# Patient Record
Sex: MALE | Race: WHITE | Employment: OTHER | URBAN - METROPOLITAN AREA
[De-identification: names, ages, dates, MRNs, and addresses within clinical notes are randomized per-mention and may not be internally consistent; named-entity substitution may affect disease eponyms.]

---

## 2017-01-24 DIAGNOSIS — R73.03 PREDIABETES: ICD-10-CM

## 2017-01-24 RX ORDER — METFORMIN HYDROCHLORIDE 500 MG/1
500 TABLET, EXTENDED RELEASE ORAL
Qty: 90 TABLET | Refills: 1 | Status: SHIPPED | OUTPATIENT
Start: 2017-01-24 | End: 2017-07-17 | Stop reason: SDUPTHER

## 2017-05-08 DIAGNOSIS — I10 ESSENTIAL HYPERTENSION: ICD-10-CM

## 2017-05-08 RX ORDER — TRIAMTERENE AND HYDROCHLOROTHIAZIDE 37.5; 25 MG/1; MG/1
CAPSULE ORAL
Qty: 90 CAPSULE | Refills: 1 | Status: SHIPPED | OUTPATIENT
Start: 2017-05-08 | End: 2017-10-05 | Stop reason: SDUPTHER

## 2017-05-08 RX ORDER — SIMVASTATIN 10 MG
TABLET ORAL
Qty: 90 TABLET | Refills: 1 | Status: SHIPPED | OUTPATIENT
Start: 2017-05-08 | End: 2017-10-05 | Stop reason: SDUPTHER

## 2018-03-22 ENCOUNTER — HOSPITAL ENCOUNTER (OUTPATIENT)
Age: 68
Discharge: HOME | End: 2018-03-22
Payer: MEDICARE

## 2018-03-22 DIAGNOSIS — Z12.5 PROSTATE CANCER SCREENING: ICD-10-CM

## 2018-03-22 DIAGNOSIS — E55.9 VITAMIN D DEFICIENCY: ICD-10-CM

## 2018-03-22 DIAGNOSIS — E78.2 MIXED HYPERLIPIDEMIA: ICD-10-CM

## 2018-03-22 DIAGNOSIS — I10 ESSENTIAL HYPERTENSION: ICD-10-CM

## 2018-03-22 LAB
ALBUMIN SERPL-MCNC: 4.4 G/DL (ref 3.5–5.2)
ALBUMIN/GLOBULIN RATIO: ABNORMAL (ref 1–2.5)
ALP BLD-CCNC: 56 U/L (ref 40–129)
ALT SERPL-CCNC: 18 U/L (ref 5–41)
ANION GAP SERPL CALCULATED.3IONS-SCNC: 13 MMOL/L (ref 9–17)
AST SERPL-CCNC: 15 U/L
BILIRUB SERPL-MCNC: 0.64 MG/DL (ref 0.3–1.2)
BUN BLDV-MCNC: 21 MG/DL (ref 8–23)
BUN/CREAT BLD: ABNORMAL (ref 9–20)
CALCIUM SERPL-MCNC: 9.2 MG/DL (ref 8.6–10.4)
CHLORIDE BLD-SCNC: 100 MMOL/L (ref 98–107)
CHOLESTEROL/HDL RATIO: 4.4
CHOLESTEROL: 168 MG/DL
CO2: 27 MMOL/L (ref 20–31)
CREAT SERPL-MCNC: 0.78 MG/DL (ref 0.7–1.2)
GFR AFRICAN AMERICAN: >60 ML/MIN
GFR NON-AFRICAN AMERICAN: >60 ML/MIN
GFR SERPL CREATININE-BSD FRML MDRD: ABNORMAL ML/MIN/{1.73_M2}
GFR SERPL CREATININE-BSD FRML MDRD: ABNORMAL ML/MIN/{1.73_M2}
GLUCOSE BLD-MCNC: 255 MG/DL (ref 70–99)
HCT VFR BLD CALC: 45.5 % (ref 41–53)
HDLC SERPL-MCNC: 38 MG/DL
HEMOGLOBIN: 15.2 G/DL (ref 13.5–17.5)
LDL CHOLESTEROL: 83 MG/DL (ref 0–130)
MCH RBC QN AUTO: 30.2 PG (ref 26–34)
MCHC RBC AUTO-ENTMCNC: 33.5 G/DL (ref 31–37)
MCV RBC AUTO: 90.2 FL (ref 80–100)
NRBC AUTOMATED: NORMAL PER 100 WBC
PDW BLD-RTO: 14.4 % (ref 11.5–14.9)
PLATELET # BLD: 168 K/UL (ref 150–450)
PMV BLD AUTO: 9.7 FL (ref 6–12)
POTASSIUM SERPL-SCNC: 3.6 MMOL/L (ref 3.7–5.3)
PROSTATE SPECIFIC ANTIGEN: 0.82 UG/L
RBC # BLD: 5.04 M/UL (ref 4.5–5.9)
SODIUM BLD-SCNC: 140 MMOL/L (ref 135–144)
TOTAL PROTEIN: 7.5 G/DL (ref 6.4–8.3)
TRIGL SERPL-MCNC: 234 MG/DL
VITAMIN D 25-HYDROXY: 26 NG/ML (ref 30–100)
VLDLC SERPL CALC-MCNC: ABNORMAL MG/DL (ref 1–30)
WBC # BLD: 6.5 K/UL (ref 3.5–11)

## 2018-03-22 PROCEDURE — 82306 VITAMIN D 25 HYDROXY: CPT

## 2018-03-22 PROCEDURE — 85027 COMPLETE CBC AUTOMATED: CPT

## 2018-03-22 PROCEDURE — 80061 LIPID PANEL: CPT

## 2018-03-22 PROCEDURE — 36415 COLL VENOUS BLD VENIPUNCTURE: CPT

## 2018-03-22 PROCEDURE — G0103 PSA SCREENING: HCPCS

## 2018-03-22 PROCEDURE — 80053 COMPREHEN METABOLIC PANEL: CPT

## 2018-07-19 ENCOUNTER — HOSPITAL ENCOUNTER (OUTPATIENT)
Age: 68
Discharge: HOME | End: 2018-07-19
Payer: MEDICARE

## 2018-07-19 LAB
CREATININE URINE: 140.9 MG/DL (ref 39–259)
ESTIMATED AVERAGE GLUCOSE: 140 MG/DL
HBA1C MFR BLD: 6.5 % (ref 4–6)
MICROALBUMIN/CREAT 24H UR: 14 MG/L
MICROALBUMIN/CREAT UR-RTO: 10 MCG/MG CREAT

## 2018-07-19 PROCEDURE — 82043 UR ALBUMIN QUANTITATIVE: CPT

## 2018-07-19 PROCEDURE — 82570 ASSAY OF URINE CREATININE: CPT

## 2018-07-19 PROCEDURE — 83036 HEMOGLOBIN GLYCOSYLATED A1C: CPT

## 2018-07-19 PROCEDURE — 36415 COLL VENOUS BLD VENIPUNCTURE: CPT

## 2018-07-26 RX ORDER — SIMVASTATIN 10 MG
TABLET ORAL
Qty: 90 TABLET | Refills: 1 | Status: SHIPPED | OUTPATIENT
Start: 2018-07-26 | End: 2018-08-14 | Stop reason: SDUPTHER

## 2018-07-26 NOTE — TELEPHONE ENCOUNTER
Please Approve and Refuse. Last Visit Date: Visit date not found  Next Visit Date:  Visit date not found    Hemoglobin A1C (%)   Date Value   07/19/2018 6.5 (H)   04/05/2018 10.6   08/22/2015 6.7 (H)             ( goal A1C is < 7)   Microalb/Crt.  Ratio (mcg/mg creat)   Date Value   07/19/2018 10     LDL Cholesterol (mg/dL)   Date Value   03/22/2018 83       (goal LDL is <100)   AST (U/L)   Date Value   03/22/2018 15     ALT (U/L)   Date Value   03/22/2018 18     BUN (mg/dL)   Date Value   03/22/2018 21     BP Readings from Last 3 Encounters:   04/05/18 134/76   10/05/17 134/80   04/05/17 130/78          (goal 120/80)        Patient Active Problem List:     Hypertension     Hyperlipidemia     RA (rheumatoid arthritis) ([facility] [address]     Borderline diabetic     Prediabetes     Essential hypertension     Mixed hyperlipidemia     Rheumatoid arthritis involving multiple joints (HCC)     Vitamin D deficiency

## 2018-08-14 ENCOUNTER — OFFICE VISIT (OUTPATIENT)
Dept: FAMILY MEDICINE CLINIC | Age: 68
End: 2018-08-14
Payer: MEDICARE

## 2018-08-14 VITALS
OXYGEN SATURATION: 98 % | HEIGHT: 67 IN | SYSTOLIC BLOOD PRESSURE: 136 MMHG | TEMPERATURE: 98 F | HEART RATE: 70 BPM | BODY MASS INDEX: 30.29 KG/M2 | DIASTOLIC BLOOD PRESSURE: 72 MMHG | WEIGHT: 193 LBS

## 2018-08-14 DIAGNOSIS — M06.9 RHEUMATOID ARTHRITIS INVOLVING MULTIPLE JOINTS (HCC): ICD-10-CM

## 2018-08-14 DIAGNOSIS — Z12.11 COLON CANCER SCREENING: ICD-10-CM

## 2018-08-14 DIAGNOSIS — I10 ESSENTIAL HYPERTENSION: Primary | ICD-10-CM

## 2018-08-14 DIAGNOSIS — E87.6 HYPOKALEMIA: ICD-10-CM

## 2018-08-14 DIAGNOSIS — E11.9 TYPE 2 DIABETES MELLITUS WITHOUT COMPLICATION, WITHOUT LONG-TERM CURRENT USE OF INSULIN (HCC): ICD-10-CM

## 2018-08-14 DIAGNOSIS — E78.2 MIXED HYPERLIPIDEMIA: ICD-10-CM

## 2018-08-14 PROCEDURE — 3044F HG A1C LEVEL LT 7.0%: CPT | Performed by: FAMILY MEDICINE

## 2018-08-14 PROCEDURE — 2022F DILAT RTA XM EVC RTNOPTHY: CPT | Performed by: FAMILY MEDICINE

## 2018-08-14 PROCEDURE — 99214 OFFICE O/P EST MOD 30 MIN: CPT | Performed by: FAMILY MEDICINE

## 2018-08-14 PROCEDURE — 4040F PNEUMOC VAC/ADMIN/RCVD: CPT | Performed by: FAMILY MEDICINE

## 2018-08-14 PROCEDURE — 1036F TOBACCO NON-USER: CPT | Performed by: FAMILY MEDICINE

## 2018-08-14 PROCEDURE — 3017F COLORECTAL CA SCREEN DOC REV: CPT | Performed by: FAMILY MEDICINE

## 2018-08-14 PROCEDURE — 1101F PT FALLS ASSESS-DOCD LE1/YR: CPT | Performed by: FAMILY MEDICINE

## 2018-08-14 PROCEDURE — 1123F ACP DISCUSS/DSCN MKR DOCD: CPT | Performed by: FAMILY MEDICINE

## 2018-08-14 PROCEDURE — G8427 DOCREV CUR MEDS BY ELIG CLIN: HCPCS | Performed by: FAMILY MEDICINE

## 2018-08-14 PROCEDURE — G8417 CALC BMI ABV UP PARAM F/U: HCPCS | Performed by: FAMILY MEDICINE

## 2018-08-14 RX ORDER — GLUCOSAMINE HCL/CHONDROITIN SU 500-400 MG
CAPSULE ORAL
Qty: 100 STRIP | Refills: 3 | Status: SHIPPED | OUTPATIENT
Start: 2018-08-14 | End: 2020-07-02 | Stop reason: SDUPTHER

## 2018-08-14 RX ORDER — SIMVASTATIN 10 MG
TABLET ORAL
Qty: 90 TABLET | Refills: 1 | Status: SHIPPED | OUTPATIENT
Start: 2018-08-14 | End: 2019-04-05 | Stop reason: SDUPTHER

## 2018-08-14 RX ORDER — ATENOLOL 50 MG/1
TABLET ORAL
Qty: 135 TABLET | Refills: 3 | Status: SHIPPED | OUTPATIENT
Start: 2018-08-14 | End: 2019-10-15 | Stop reason: SDUPTHER

## 2018-08-14 RX ORDER — AMLODIPINE BESYLATE 5 MG/1
TABLET ORAL
Qty: 90 TABLET | Refills: 3 | Status: SHIPPED | OUTPATIENT
Start: 2018-08-14 | End: 2019-10-13 | Stop reason: SDUPTHER

## 2018-08-14 RX ORDER — POTASSIUM CHLORIDE 750 MG/1
CAPSULE, EXTENDED RELEASE ORAL
Qty: 180 CAPSULE | Refills: 3 | Status: SHIPPED | OUTPATIENT
Start: 2018-08-14 | End: 2019-06-10 | Stop reason: SDUPTHER

## 2018-08-14 RX ORDER — LOSARTAN POTASSIUM 100 MG/1
TABLET ORAL
Qty: 90 TABLET | Refills: 3 | Status: SHIPPED | OUTPATIENT
Start: 2018-08-14 | End: 2019-07-29 | Stop reason: SDUPTHER

## 2018-08-14 ASSESSMENT — ENCOUNTER SYMPTOMS
RHINORRHEA: 0
PHOTOPHOBIA: 0
CHEST TIGHTNESS: 0
ABDOMINAL PAIN: 0
SORE THROAT: 0
SHORTNESS OF BREATH: 0
WHEEZING: 0
DIARRHEA: 0
CONSTIPATION: 0
SINUS PRESSURE: 0
BLOOD IN STOOL: 0
COLOR CHANGE: 0
VOMITING: 0

## 2018-08-14 NOTE — PROGRESS NOTES
Visit Information    Have you changed or started any medications since your last visit including any over-the-counter medicines, vitamins, or herbal medicines? no   Are you having any side effects from any of your medications? -  no  Have you stopped taking any of your medications? Is so, why? -  no    Have you seen any other physician or provider since your last visit? Yes - Records Obtained  Have you had any other diagnostic tests since your last visit? No  Have you been seen in the emergency room and/or had an admission to a hospital since we last saw you? No  Have you had your routine dental cleaning in the past 6 months? yes -     Have you activated your [employer] account? If not, what are your barriers?  Yes     Patient Care Team:  Pb Wilkins MD as PCP - General (Family Medicine)  Gary Magaña MD as PCP - S Attributed Provider  Karin Strickland MD as Consulting Physician (Internal Medicine)    Medical History Review  Past Medical, Family, and Social History reviewed and does contribute to the patient presenting condition    Health Maintenance   Topic Date Due    DTaP/Tdap/Td vaccine (1 - Tdap) 10/05/2018 (Originally 7/27/1969)    Pneumococcal low/med risk (1 of 2 - PCV13) 10/05/2018 (Originally 7/27/2017)    Hepatitis C screen  10/05/2018 (Originally 1950)    Shingles Vaccine (1 of 2 - 2 Dose Series) 04/05/2019 (Originally 7/27/2002)    Flu vaccine (1) 09/01/2018    Potassium monitoring  03/22/2019    Creatinine monitoring  03/22/2019    A1C test (Diabetic or Prediabetic)  07/19/2019    Lipid screen  03/22/2023    Colon cancer screen colonoscopy  02/10/2026
their HBA1c result.  Microalb, Ur 07/19/2018 14  <21 mg/L Final    Creatinine, Ur 07/19/2018 140.9  39.0 - 259.0 mg/dL Final    Microalb/Crt.  Ratio 07/19/2018 10  <17 mcg/mg creat Final         Most recent labs reviewed:     Lab Results   Component Value Date    WBC 6.5 03/22/2018    HGB 15.2 03/22/2018    HCT 45.5 03/22/2018    MCV 90.2 03/22/2018     03/22/2018       Lab Results   Component Value Date     03/22/2018    K 3.6 03/22/2018     03/22/2018    CO2 27 03/22/2018    BUN 21 03/22/2018    CREATININE 0.78 03/22/2018    GLUCOSE 255 03/22/2018    GLUCOSE 114 03/16/2012    CALCIUM 9.2 03/22/2018        Lab Results   Component Value Date    ALT 18 03/22/2018    AST 15 03/22/2018    ALKPHOS 56 03/22/2018    BILITOT 0.64 03/22/2018       No results found for: TSHFT4, TSH    Lab Results   Component Value Date    CHOL 168 03/22/2018    CHOL 144 11/28/2016    CHOL 173 08/22/2015     Lab Results   Component Value Date    TRIG 234 (H) 03/22/2018    TRIG 215 (H) 11/28/2016    TRIG 215 (H) 08/22/2015     Lab Results   Component Value Date    HDL 38 (L) 03/22/2018    HDL 38 (L) 11/28/2016    HDL 38 (L) 08/22/2015     Lab Results   Component Value Date    LDLCHOLESTEROL 83 03/22/2018    LDLCHOLESTEROL 63 11/28/2016    LDLCHOLESTEROL 92 08/22/2015     Lab Results   Component Value Date    VLDL NOT REPORTED 03/22/2018    VLDL NOT REPORTED 11/28/2016    VLDL NOT REPORTED 08/22/2015     Lab Results   Component Value Date    CHOLHDLRATIO 4.4 03/22/2018    CHOLHDLRATIO 3.8 11/28/2016    CHOLHDLRATIO 4.6 08/22/2015       Lab Results   Component Value Date    LABA1C 6.5 (H) 07/19/2018       No results found for: [id number]    No results found for: FOLATE    No results found for: IRON, TIBC, FERRITIN    Lab Results   Component Value Date    VITD25 26.0 (L) 03/22/2018             Current Outpatient Prescriptions   Medication Sig Dispense Refill    amLODIPine (NORVASC) 5 MG tablet TAKE 1 TABLET DAILY 90

## 2018-10-05 DIAGNOSIS — R19.5 POSITIVE FIT (FECAL IMMUNOCHEMICAL TEST): Primary | ICD-10-CM

## 2018-10-05 DIAGNOSIS — Z12.11 COLON CANCER SCREENING: ICD-10-CM

## 2018-10-05 LAB
CONTROL: PRESENT
HEMOCCULT STL QL: POSITIVE

## 2018-10-05 PROCEDURE — 82274 ASSAY TEST FOR BLOOD FECAL: CPT | Performed by: FAMILY MEDICINE

## 2018-10-19 ENCOUNTER — HOSPITAL ENCOUNTER (OUTPATIENT)
Age: 68
Discharge: HOME | End: 2018-10-19
Payer: MEDICARE

## 2018-10-19 LAB
ESTIMATED AVERAGE GLUCOSE: 134 MG/DL
HBA1C MFR BLD: 6.3 % (ref 4–6)

## 2018-10-19 PROCEDURE — 36415 COLL VENOUS BLD VENIPUNCTURE: CPT

## 2018-10-19 PROCEDURE — 83036 HEMOGLOBIN GLYCOSYLATED A1C: CPT

## 2018-11-14 ENCOUNTER — OFFICE VISIT (OUTPATIENT)
Dept: FAMILY MEDICINE CLINIC | Age: 68
End: 2018-11-14
Payer: MEDICARE

## 2018-11-14 VITALS
HEART RATE: 72 BPM | WEIGHT: 185.6 LBS | OXYGEN SATURATION: 97 % | BODY MASS INDEX: 29.13 KG/M2 | HEIGHT: 67 IN | DIASTOLIC BLOOD PRESSURE: 82 MMHG | SYSTOLIC BLOOD PRESSURE: 122 MMHG

## 2018-11-14 DIAGNOSIS — E11.9 TYPE 2 DIABETES MELLITUS WITHOUT COMPLICATION, WITHOUT LONG-TERM CURRENT USE OF INSULIN (HCC): Primary | ICD-10-CM

## 2018-11-14 DIAGNOSIS — I10 ESSENTIAL HYPERTENSION: ICD-10-CM

## 2018-11-14 DIAGNOSIS — R19.5 POSITIVE FIT (FECAL IMMUNOCHEMICAL TEST): ICD-10-CM

## 2018-11-14 DIAGNOSIS — E78.2 MIXED HYPERLIPIDEMIA: ICD-10-CM

## 2018-11-14 DIAGNOSIS — Z00.00 PREVENTATIVE HEALTH CARE: ICD-10-CM

## 2018-11-14 PROCEDURE — 3017F COLORECTAL CA SCREEN DOC REV: CPT | Performed by: FAMILY MEDICINE

## 2018-11-14 PROCEDURE — G8482 FLU IMMUNIZE ORDER/ADMIN: HCPCS | Performed by: FAMILY MEDICINE

## 2018-11-14 PROCEDURE — G8417 CALC BMI ABV UP PARAM F/U: HCPCS | Performed by: FAMILY MEDICINE

## 2018-11-14 PROCEDURE — 2022F DILAT RTA XM EVC RTNOPTHY: CPT | Performed by: FAMILY MEDICINE

## 2018-11-14 PROCEDURE — 3044F HG A1C LEVEL LT 7.0%: CPT | Performed by: FAMILY MEDICINE

## 2018-11-14 PROCEDURE — G8427 DOCREV CUR MEDS BY ELIG CLIN: HCPCS | Performed by: FAMILY MEDICINE

## 2018-11-14 PROCEDURE — 4040F PNEUMOC VAC/ADMIN/RCVD: CPT | Performed by: FAMILY MEDICINE

## 2018-11-14 PROCEDURE — 1036F TOBACCO NON-USER: CPT | Performed by: FAMILY MEDICINE

## 2018-11-14 PROCEDURE — 99214 OFFICE O/P EST MOD 30 MIN: CPT | Performed by: FAMILY MEDICINE

## 2018-11-14 PROCEDURE — 1123F ACP DISCUSS/DSCN MKR DOCD: CPT | Performed by: FAMILY MEDICINE

## 2018-11-14 PROCEDURE — 1101F PT FALLS ASSESS-DOCD LE1/YR: CPT | Performed by: FAMILY MEDICINE

## 2018-11-14 RX ORDER — PNEUMOCOCCAL VACCINE POLYVALENT 25; 25; 25; 25; 25; 25; 25; 25; 25; 25; 25; 25; 25; 25; 25; 25; 25; 25; 25; 25; 25; 25; 25 UG/.5ML; UG/.5ML; UG/.5ML; UG/.5ML; UG/.5ML; UG/.5ML; UG/.5ML; UG/.5ML; UG/.5ML; UG/.5ML; UG/.5ML; UG/.5ML; UG/.5ML; UG/.5ML; UG/.5ML; UG/.5ML; UG/.5ML; UG/.5ML; UG/.5ML; UG/.5ML; UG/.5ML; UG/.5ML; UG/.5ML
INJECTION, SOLUTION INTRAMUSCULAR; SUBCUTANEOUS
Refills: 0 | COMMUNITY
Start: 2018-11-03 | End: 2019-05-30 | Stop reason: ALTCHOICE

## 2018-11-14 RX ORDER — INFLUENZA VACCINE, ADJUVANTED 15; 15; 15 UG/.5ML; UG/.5ML; UG/.5ML
INJECTION, SUSPENSION INTRAMUSCULAR
Refills: 0 | COMMUNITY
Start: 2018-11-03 | End: 2019-05-30 | Stop reason: ALTCHOICE

## 2018-11-14 ASSESSMENT — ENCOUNTER SYMPTOMS
DIARRHEA: 0
ABDOMINAL DISTENTION: 0
ABDOMINAL PAIN: 0
CONSTIPATION: 0
COLOR CHANGE: 0
WHEEZING: 0
CHEST TIGHTNESS: 0
SHORTNESS OF BREATH: 0
PHOTOPHOBIA: 0
COUGH: 0
RHINORRHEA: 0
BACK PAIN: 1

## 2018-11-14 ASSESSMENT — PATIENT HEALTH QUESTIONNAIRE - PHQ9
SUM OF ALL RESPONSES TO PHQ QUESTIONS 1-9: 0
SUM OF ALL RESPONSES TO PHQ QUESTIONS 1-9: 0
2. FEELING DOWN, DEPRESSED OR HOPELESS: 0

## 2018-11-14 NOTE — PROGRESS NOTES
@BRIEFLAB(NA,K,CL,CO2,BUN,CREATININE,GLUCOSE,CALCIUM)@     Lab Results   Component Value Date    ALT 18 03/22/2018    AST 15 03/22/2018    ALKPHOS 56 03/22/2018    BILITOT 0.64 03/22/2018       No results found for: TSHFT4, TSH    Lab Results   Component Value Date    CHOL 168 03/22/2018    CHOL 144 11/28/2016    CHOL 173 08/22/2015     Lab Results   Component Value Date    TRIG 234 (H) 03/22/2018    TRIG 215 (H) 11/28/2016    TRIG 215 (H) 08/22/2015     Lab Results   Component Value Date    HDL 38 (L) 03/22/2018    HDL 38 (L) 11/28/2016    HDL 38 (L) 08/22/2015     Lab Results   Component Value Date    LDLCHOLESTEROL 83 03/22/2018    LDLCHOLESTEROL 63 11/28/2016    LDLCHOLESTEROL 92 08/22/2015     Lab Results   Component Value Date    VLDL NOT REPORTED 03/22/2018    VLDL NOT REPORTED 11/28/2016    VLDL NOT REPORTED 08/22/2015     Lab Results   Component Value Date    CHOLHDLRATIO 4.4 03/22/2018    CHOLHDLRATIO 3.8 11/28/2016    CHOLHDLRATIO 4.6 08/22/2015       Lab Results   Component Value Date    LABA1C 6.3 (H) 10/19/2018       No results found for: BTHFQEUQ90    No results found for: FOLATE    No results found for: IRON, TIBC, FERRITIN    Lab Results   Component Value Date    VITD25 26.0 (L) 03/22/2018             Current Outpatient Prescriptions   Medication Sig Dispense Refill    FLUAD 0.5 ML NANDO inject 0.5 milliliter intramuscularly  0    ONETOUCH DELICA LANCETS FINE MISC DX:E11.65 PATIENT TO TEST 2-3 TIMES DAILY  11    PNEUMOVAX 23 25 MCG/0.5ML inj inject 0.5 milliliter intramuscularly  0    amLODIPine (NORVASC) 5 MG tablet TAKE 1 TABLET DAILY 90 tablet 3    atenolol (TENORMIN) 50 MG tablet TAKE 1 AND 1/2 TABLETS     DAILY 135 tablet 3    blood glucose monitor strips Test blood sugar once daily 100 strip 3    potassium chloride (KLOR-CON SPRINKLE) 10 MEQ extended release capsule TAKE 1 CAPSULE TWICE DAILY 180 capsule 3    losartan (COZAAR) 100 MG tablet TAKE 1 TABLET DAILY 90 tablet 3    simvastatin (ZOCOR) 10 MG tablet TAKE 1 TABLET NIGHTLY 90 tablet 1    triamterene-hydrochlorothiazide (DYAZIDE) 37.5-25 MG per capsule TAKE 1 CAPSULE EVERY       MORNING 90 capsule 1    vitamin D (CHOLECALCIFEROL) 1000 UNIT TABS tablet Take 1,000 Units by mouth daily      fluticasone (FLONASE) 50 MCG/ACT nasal spray 1 spray by Nasal route daily      loratadine (CLARITIN) 10 MG tablet Take 10 mg by mouth daily as needed       folic acid (FOLVITE) 1 MG tablet Take 1 mg by mouth daily      methotrexate (RHEUMATREX) 2.5 MG chemo tablet Take 2.5 mg by mouth once a week 10 tabs weekly      inFLIXimab (REMICADE) 100 MG injection Infuse 5 mg/kg intravenously See Admin Instructions Every 8 weeks      sitaGLIPtan-metformin (JANUMET)  MG per tablet Take 1 tablet by mouth 2 times daily (with meals) 180 tablet 3     No current facility-administered medications for this visit. Social History     Social History    Marital status:      Spouse name: N/A    Number of children: N/A    Years of education: N/A     Occupational History    Not on file. Social History Main Topics    Smoking status: Never Smoker    Smokeless tobacco: Never Used    Alcohol use No    Drug use: No    Sexual activity: Yes     Partners: Female     Other Topics Concern    Not on file     Social History Narrative    No narrative on file     Counseling given: Not Answered        Family History   Problem Relation Age of Onset    Other Mother         mild dysplasia    Cancer Father         throat, lung             -rest of complaints with corresponding details per ROS    The patient's past medical, surgical, social, and family history as well as his current medications and allergies were reviewed as documented intoday's encounter. Review of Systems   Constitutional: Negative for activity change, appetite change, diaphoresis and fever. HENT: Negative for congestion, postnasal drip and rhinorrhea.     Eyes: Value Date    LDLCHOLESTEROL 83 03/22/2018    (goal LDL reduction with dx if diabetes is 50% LDL reduction)    PHQ Scores 11/14/2018 4/5/2018 4/5/2017   PHQ2 Score - 0 0   PHQ9 Score 0 0 0     Interpretation of Total Score Depression Severity: 1-4 = Minimal depression, 5-9 = Mild depression, 10-14 = Moderate depression, 15-19 = Moderately severe depression, 20-27 = Severe depression      The patient'spast medical, surgical, social, and family history as well as his   current medications and allergies were reviewed as documented in today's encounter. Medications, labs, diagnostic studies, consultations andfollow-up as documented in this encounter. Return in about 3 months (around 2/14/2019) for dm ,htn, hld, COLONOSCOPY REPORT . [place] Patient wasseen with total face to face time of 25 minutes. More than 50% of this visit was counseling and education. Future Appointments  Date Time Provider Papito Simms   12/7/2018 1:15 PM Rocio Major MD [facility]   2/14/2019 9:30 AM Presley Farias MD [facility]     This note was completed by using the assistance of a speech-recognition program. However, inadvertent computerized transcription errors may be present. Althoughevery effort was made to ensure accuracy, no guarantees can be provided that every mistake has been identified and corrected by editing.   Electronically signed by Presley Farias MD on 11/14/2018  11:13 AM

## 2018-12-07 ENCOUNTER — OFFICE VISIT (OUTPATIENT)
Dept: GASTROENTEROLOGY | Age: 68
End: 2018-12-07
Payer: MEDICARE

## 2018-12-07 VITALS
DIASTOLIC BLOOD PRESSURE: 83 MMHG | HEART RATE: 74 BPM | SYSTOLIC BLOOD PRESSURE: 157 MMHG | WEIGHT: 188.4 LBS | BODY MASS INDEX: 29.51 KG/M2

## 2018-12-07 DIAGNOSIS — R19.5 POSITIVE FIT (FECAL IMMUNOCHEMICAL TEST): Primary | ICD-10-CM

## 2018-12-07 PROCEDURE — G8482 FLU IMMUNIZE ORDER/ADMIN: HCPCS | Performed by: INTERNAL MEDICINE

## 2018-12-07 PROCEDURE — 3017F COLORECTAL CA SCREEN DOC REV: CPT | Performed by: INTERNAL MEDICINE

## 2018-12-07 PROCEDURE — 1101F PT FALLS ASSESS-DOCD LE1/YR: CPT | Performed by: INTERNAL MEDICINE

## 2018-12-07 PROCEDURE — 99204 OFFICE O/P NEW MOD 45 MIN: CPT | Performed by: INTERNAL MEDICINE

## 2018-12-07 PROCEDURE — G8427 DOCREV CUR MEDS BY ELIG CLIN: HCPCS | Performed by: INTERNAL MEDICINE

## 2018-12-07 PROCEDURE — G8417 CALC BMI ABV UP PARAM F/U: HCPCS | Performed by: INTERNAL MEDICINE

## 2018-12-07 RX ORDER — POLYETHYLENE GLYCOL 3350 17 G/17G
POWDER, FOR SOLUTION ORAL
Qty: 255 G | Refills: 0 | Status: SHIPPED | OUTPATIENT
Start: 2018-12-07 | End: 2019-01-06

## 2018-12-07 ASSESSMENT — ENCOUNTER SYMPTOMS
TROUBLE SWALLOWING: 0
CHOKING: 0
VOMITING: 0
SINUS PRESSURE: 1
DIARRHEA: 0
RECTAL PAIN: 0
ABDOMINAL PAIN: 0
CONSTIPATION: 0
EYES NEGATIVE: 1
ABDOMINAL DISTENTION: 0
SINUS PAIN: 1
COUGH: 0
NAUSEA: 0
ANAL BLEEDING: 0
BLOOD IN STOOL: 0

## 2018-12-10 ENCOUNTER — TELEPHONE (OUTPATIENT)
Dept: GASTROENTEROLOGY | Age: 68
End: 2018-12-10

## 2018-12-13 ENCOUNTER — TELEPHONE (OUTPATIENT)
Dept: GASTROENTEROLOGY | Age: 68
End: 2018-12-13

## 2018-12-14 ENCOUNTER — HOSPITAL ENCOUNTER (OUTPATIENT)
Age: 68
Setting detail: OUTPATIENT SURGERY
Discharge: HOME | End: 2018-12-14
Attending: INTERNAL MEDICINE | Admitting: INTERNAL MEDICINE
Payer: MEDICARE

## 2018-12-14 VITALS
DIASTOLIC BLOOD PRESSURE: 72 MMHG | HEIGHT: 68 IN | RESPIRATION RATE: 16 BRPM | BODY MASS INDEX: 28.04 KG/M2 | WEIGHT: 185 LBS | TEMPERATURE: 97.9 F | HEART RATE: 66 BPM | SYSTOLIC BLOOD PRESSURE: 116 MMHG | OXYGEN SATURATION: 95 %

## 2018-12-14 PROCEDURE — 88305 TISSUE EXAM BY PATHOLOGIST: CPT

## 2018-12-14 PROCEDURE — 99153 MOD SED SAME PHYS/QHP EA: CPT | Performed by: INTERNAL MEDICINE

## 2018-12-14 PROCEDURE — 7100000010 HC PHASE II RECOVERY - FIRST 15 MIN: Performed by: INTERNAL MEDICINE

## 2018-12-14 PROCEDURE — 7100000011 HC PHASE II RECOVERY - ADDTL 15 MIN: Performed by: INTERNAL MEDICINE

## 2018-12-14 PROCEDURE — 2709999900 HC NON-CHARGEABLE SUPPLY: Performed by: INTERNAL MEDICINE

## 2018-12-14 PROCEDURE — 6360000002 HC RX W HCPCS: Performed by: INTERNAL MEDICINE

## 2018-12-14 PROCEDURE — 3609010400 HC COLONOSCOPY POLYPECTOMY HOT BIOPSY: Performed by: INTERNAL MEDICINE

## 2018-12-14 PROCEDURE — 2580000003 HC RX 258: Performed by: INTERNAL MEDICINE

## 2018-12-14 PROCEDURE — 99152 MOD SED SAME PHYS/QHP 5/>YRS: CPT | Performed by: INTERNAL MEDICINE

## 2018-12-14 PROCEDURE — C1773 RET DEV, INSERTABLE: HCPCS | Performed by: INTERNAL MEDICINE

## 2018-12-14 PROCEDURE — 45385 COLONOSCOPY W/LESION REMOVAL: CPT | Performed by: INTERNAL MEDICINE

## 2018-12-14 RX ORDER — SODIUM CHLORIDE 9 MG/ML
INJECTION, SOLUTION INTRAVENOUS CONTINUOUS
Status: DISCONTINUED | OUTPATIENT
Start: 2018-12-14 | End: 2018-12-14 | Stop reason: HOSPADM

## 2018-12-14 RX ORDER — FENTANYL CITRATE 50 UG/ML
INJECTION, SOLUTION INTRAMUSCULAR; INTRAVENOUS PRN
Status: DISCONTINUED | OUTPATIENT
Start: 2018-12-14 | End: 2018-12-14 | Stop reason: HOSPADM

## 2018-12-14 RX ORDER — MIDAZOLAM HYDROCHLORIDE 1 MG/ML
INJECTION INTRAMUSCULAR; INTRAVENOUS PRN
Status: DISCONTINUED | OUTPATIENT
Start: 2018-12-14 | End: 2018-12-14 | Stop reason: HOSPADM

## 2018-12-14 RX ADMIN — SODIUM CHLORIDE: 9 INJECTION, SOLUTION INTRAVENOUS at 11:08

## 2018-12-14 ASSESSMENT — PAIN SCALES - GENERAL
PAINLEVEL_OUTOF10: 0
PAINLEVEL_OUTOF10: 0

## 2018-12-14 ASSESSMENT — PAIN - FUNCTIONAL ASSESSMENT: PAIN_FUNCTIONAL_ASSESSMENT: 0-10

## 2018-12-17 LAB — SURGICAL PATHOLOGY REPORT: NORMAL

## 2019-01-17 ENCOUNTER — OFFICE VISIT (OUTPATIENT)
Dept: GASTROENTEROLOGY | Age: 69
End: 2019-01-17
Payer: MEDICARE

## 2019-01-17 VITALS
HEART RATE: 74 BPM | WEIGHT: 176.6 LBS | BODY MASS INDEX: 27.25 KG/M2 | SYSTOLIC BLOOD PRESSURE: 118 MMHG | DIASTOLIC BLOOD PRESSURE: 74 MMHG

## 2019-01-17 DIAGNOSIS — R19.5 POSITIVE FIT (FECAL IMMUNOCHEMICAL TEST): ICD-10-CM

## 2019-01-17 DIAGNOSIS — K57.90 DIVERTICULOSIS OF INTESTINE WITHOUT BLEEDING, UNSPECIFIED INTESTINAL TRACT LOCATION: ICD-10-CM

## 2019-01-17 PROCEDURE — 1101F PT FALLS ASSESS-DOCD LE1/YR: CPT | Performed by: INTERNAL MEDICINE

## 2019-01-17 PROCEDURE — G8417 CALC BMI ABV UP PARAM F/U: HCPCS | Performed by: INTERNAL MEDICINE

## 2019-01-17 PROCEDURE — 1123F ACP DISCUSS/DSCN MKR DOCD: CPT | Performed by: INTERNAL MEDICINE

## 2019-01-17 PROCEDURE — 1036F TOBACCO NON-USER: CPT | Performed by: INTERNAL MEDICINE

## 2019-01-17 PROCEDURE — G8427 DOCREV CUR MEDS BY ELIG CLIN: HCPCS | Performed by: INTERNAL MEDICINE

## 2019-01-17 PROCEDURE — G8482 FLU IMMUNIZE ORDER/ADMIN: HCPCS | Performed by: INTERNAL MEDICINE

## 2019-01-17 PROCEDURE — 3017F COLORECTAL CA SCREEN DOC REV: CPT | Performed by: INTERNAL MEDICINE

## 2019-01-17 PROCEDURE — 99213 OFFICE O/P EST LOW 20 MIN: CPT | Performed by: INTERNAL MEDICINE

## 2019-01-17 PROCEDURE — 4040F PNEUMOC VAC/ADMIN/RCVD: CPT | Performed by: INTERNAL MEDICINE

## 2019-01-17 ASSESSMENT — ENCOUNTER SYMPTOMS
TROUBLE SWALLOWING: 0
CONSTIPATION: 0
NAUSEA: 0
DIARRHEA: 0
SORE THROAT: 0
VOMITING: 0
RECTAL PAIN: 0
SINUS PRESSURE: 0
BLOOD IN STOOL: 0
COUGH: 0
CHEST TIGHTNESS: 0
ABDOMINAL DISTENTION: 0
BACK PAIN: 0
ANAL BLEEDING: 0
SHORTNESS OF BREATH: 0
SINUS PAIN: 0
ABDOMINAL PAIN: 0

## 2019-02-14 ENCOUNTER — OFFICE VISIT (OUTPATIENT)
Dept: FAMILY MEDICINE CLINIC | Age: 69
End: 2019-02-14
Payer: MEDICARE

## 2019-02-14 VITALS
BODY MASS INDEX: 28.63 KG/M2 | OXYGEN SATURATION: 99 % | DIASTOLIC BLOOD PRESSURE: 85 MMHG | HEIGHT: 67 IN | WEIGHT: 182.4 LBS | SYSTOLIC BLOOD PRESSURE: 124 MMHG | HEART RATE: 75 BPM

## 2019-02-14 DIAGNOSIS — E78.2 MIXED HYPERLIPIDEMIA: ICD-10-CM

## 2019-02-14 DIAGNOSIS — M06.9 RHEUMATOID ARTHRITIS INVOLVING MULTIPLE JOINTS (HCC): ICD-10-CM

## 2019-02-14 DIAGNOSIS — E11.9 TYPE 2 DIABETES MELLITUS WITHOUT COMPLICATION, WITHOUT LONG-TERM CURRENT USE OF INSULIN (HCC): Primary | ICD-10-CM

## 2019-02-14 DIAGNOSIS — D36.9 ADENOMATOUS POLYP: ICD-10-CM

## 2019-02-14 DIAGNOSIS — M05.79 RHEUMATOID ARTHRITIS INVOLVING MULTIPLE SITES WITH POSITIVE RHEUMATOID FACTOR (HCC): ICD-10-CM

## 2019-02-14 DIAGNOSIS — R19.5 POSITIVE FIT (FECAL IMMUNOCHEMICAL TEST): ICD-10-CM

## 2019-02-14 PROBLEM — E87.6 HYPOKALEMIA: Status: RESOLVED | Noted: 2018-08-14 | Resolved: 2019-02-14

## 2019-02-14 LAB — HBA1C MFR BLD: 5.5 %

## 2019-02-14 PROCEDURE — 99214 OFFICE O/P EST MOD 30 MIN: CPT | Performed by: FAMILY MEDICINE

## 2019-02-14 PROCEDURE — G8482 FLU IMMUNIZE ORDER/ADMIN: HCPCS | Performed by: FAMILY MEDICINE

## 2019-02-14 PROCEDURE — G8417 CALC BMI ABV UP PARAM F/U: HCPCS | Performed by: FAMILY MEDICINE

## 2019-02-14 PROCEDURE — 2022F DILAT RTA XM EVC RTNOPTHY: CPT | Performed by: FAMILY MEDICINE

## 2019-02-14 PROCEDURE — 4040F PNEUMOC VAC/ADMIN/RCVD: CPT | Performed by: FAMILY MEDICINE

## 2019-02-14 PROCEDURE — 1101F PT FALLS ASSESS-DOCD LE1/YR: CPT | Performed by: FAMILY MEDICINE

## 2019-02-14 PROCEDURE — 83036 HEMOGLOBIN GLYCOSYLATED A1C: CPT | Performed by: FAMILY MEDICINE

## 2019-02-14 PROCEDURE — 3017F COLORECTAL CA SCREEN DOC REV: CPT | Performed by: FAMILY MEDICINE

## 2019-02-14 PROCEDURE — G8427 DOCREV CUR MEDS BY ELIG CLIN: HCPCS | Performed by: FAMILY MEDICINE

## 2019-02-14 PROCEDURE — 1036F TOBACCO NON-USER: CPT | Performed by: FAMILY MEDICINE

## 2019-02-14 PROCEDURE — 1123F ACP DISCUSS/DSCN MKR DOCD: CPT | Performed by: FAMILY MEDICINE

## 2019-02-14 PROCEDURE — 3044F HG A1C LEVEL LT 7.0%: CPT | Performed by: FAMILY MEDICINE

## 2019-02-14 ASSESSMENT — PATIENT HEALTH QUESTIONNAIRE - PHQ9
1. LITTLE INTEREST OR PLEASURE IN DOING THINGS: 0
SUM OF ALL RESPONSES TO PHQ QUESTIONS 1-9: 0
2. FEELING DOWN, DEPRESSED OR HOPELESS: 0
SUM OF ALL RESPONSES TO PHQ9 QUESTIONS 1 & 2: 0
SUM OF ALL RESPONSES TO PHQ QUESTIONS 1-9: 0

## 2019-02-14 ASSESSMENT — ENCOUNTER SYMPTOMS
EYE REDNESS: 0
SHORTNESS OF BREATH: 0
DIARRHEA: 0
SINUS PAIN: 0
WHEEZING: 0
CONSTIPATION: 0
ABDOMINAL PAIN: 0
PHOTOPHOBIA: 0
BACK PAIN: 1
VOMITING: 0
NAUSEA: 0
ABDOMINAL DISTENTION: 0
COUGH: 0
BLOOD IN STOOL: 0
RHINORRHEA: 0

## 2019-04-05 DIAGNOSIS — E78.2 MIXED HYPERLIPIDEMIA: ICD-10-CM

## 2019-04-05 RX ORDER — SIMVASTATIN 10 MG
TABLET ORAL
Qty: 90 TABLET | Refills: 1 | Status: SHIPPED | OUTPATIENT
Start: 2019-04-05 | End: 2019-09-16 | Stop reason: SDUPTHER

## 2019-04-05 NOTE — TELEPHONE ENCOUNTER
Please Approve or Refuse.   Send to Pharmacy per Pt's Request: Simvastatin     Next Visit Date:  5/30/2019   Last Visit Date: 2/14/2019    Hemoglobin A1C (%)   Date Value   02/14/2019 5.5   10/19/2018 6.3 (H)   07/19/2018 6.5 (H)             ( goal A1C is < 7)   BP Readings from Last 3 Encounters:   02/14/19 124/85   01/17/19 118/74   12/14/18 116/72          (goal 120/80)  BUN   Date Value Ref Range Status   03/22/2018 21 8 - 23 mg/dL Final     CREATININE   Date Value Ref Range Status   03/22/2018 0.78 0.70 - 1.20 mg/dL Final     Potassium   Date Value Ref Range Status   03/22/2018 3.6 (L) 3.7 - 5.3 mmol/L Final

## 2019-05-29 ENCOUNTER — HOSPITAL ENCOUNTER (OUTPATIENT)
Age: 69
Setting detail: SPECIMEN
Discharge: HOME | End: 2019-05-29
Payer: MEDICARE

## 2019-05-29 DIAGNOSIS — E78.2 MIXED HYPERLIPIDEMIA: ICD-10-CM

## 2019-05-29 DIAGNOSIS — Z00.00 PREVENTATIVE HEALTH CARE: ICD-10-CM

## 2019-05-29 DIAGNOSIS — M05.79 RHEUMATOID ARTHRITIS INVOLVING MULTIPLE SITES WITH POSITIVE RHEUMATOID FACTOR (HCC): ICD-10-CM

## 2019-05-29 DIAGNOSIS — M06.9 RHEUMATOID ARTHRITIS INVOLVING MULTIPLE JOINTS (HCC): ICD-10-CM

## 2019-05-29 DIAGNOSIS — E11.9 TYPE 2 DIABETES MELLITUS WITHOUT COMPLICATION, WITHOUT LONG-TERM CURRENT USE OF INSULIN (HCC): ICD-10-CM

## 2019-05-29 LAB
ABSOLUTE EOS #: 0.2 K/UL (ref 0–0.4)
ABSOLUTE IMMATURE GRANULOCYTE: ABNORMAL K/UL (ref 0–0.3)
ABSOLUTE LYMPH #: 1.3 K/UL (ref 1–4.8)
ABSOLUTE MONO #: 0.2 K/UL (ref 0.1–1.3)
ALBUMIN SERPL-MCNC: 4.2 G/DL (ref 3.5–5.2)
ALBUMIN/GLOBULIN RATIO: ABNORMAL (ref 1–2.5)
ALP BLD-CCNC: 43 U/L (ref 40–129)
ALT SERPL-CCNC: 39 U/L (ref 5–41)
ANION GAP SERPL CALCULATED.3IONS-SCNC: 12 MMOL/L (ref 9–17)
AST SERPL-CCNC: 28 U/L
BASOPHILS # BLD: 1 % (ref 0–2)
BASOPHILS ABSOLUTE: 0.1 K/UL (ref 0–0.2)
BILIRUB SERPL-MCNC: 0.45 MG/DL (ref 0.3–1.2)
BUN BLDV-MCNC: 19 MG/DL (ref 8–23)
BUN/CREAT BLD: ABNORMAL (ref 9–20)
CALCIUM SERPL-MCNC: 9.4 MG/DL (ref 8.6–10.4)
CHLORIDE BLD-SCNC: 102 MMOL/L (ref 98–107)
CHOLESTEROL/HDL RATIO: 3.9
CHOLESTEROL: 135 MG/DL
CO2: 27 MMOL/L (ref 20–31)
CREAT SERPL-MCNC: 0.8 MG/DL (ref 0.7–1.2)
DIFFERENTIAL TYPE: ABNORMAL
EOSINOPHILS RELATIVE PERCENT: 4 % (ref 0–4)
GFR AFRICAN AMERICAN: >60 ML/MIN
GFR NON-AFRICAN AMERICAN: >60 ML/MIN
GFR SERPL CREATININE-BSD FRML MDRD: ABNORMAL ML/MIN/{1.73_M2}
GFR SERPL CREATININE-BSD FRML MDRD: ABNORMAL ML/MIN/{1.73_M2}
GLUCOSE BLD-MCNC: 122 MG/DL (ref 70–99)
HCT VFR BLD CALC: 39.9 % (ref 41–53)
HDLC SERPL-MCNC: 35 MG/DL
HEMOGLOBIN: 13.4 G/DL (ref 13.5–17.5)
HEPATITIS C ANTIBODY: NONREACTIVE
IMMATURE GRANULOCYTES: ABNORMAL %
LDL CHOLESTEROL: 77 MG/DL (ref 0–130)
LYMPHOCYTES # BLD: 26 % (ref 24–44)
MCH RBC QN AUTO: 30.6 PG (ref 26–34)
MCHC RBC AUTO-ENTMCNC: 33.6 G/DL (ref 31–37)
MCV RBC AUTO: 91.1 FL (ref 80–100)
MONOCYTES # BLD: 5 % (ref 1–7)
NRBC AUTOMATED: ABNORMAL PER 100 WBC
PDW BLD-RTO: 14.9 % (ref 11.5–14.9)
PLATELET # BLD: 178 K/UL (ref 150–450)
PLATELET ESTIMATE: ABNORMAL
PMV BLD AUTO: 9.5 FL (ref 6–12)
POTASSIUM SERPL-SCNC: 4.1 MMOL/L (ref 3.7–5.3)
RBC # BLD: 4.38 M/UL (ref 4.5–5.9)
RBC # BLD: ABNORMAL 10*6/UL
SEG NEUTROPHILS: 64 % (ref 36–66)
SEGMENTED NEUTROPHILS ABSOLUTE COUNT: 3.2 K/UL (ref 1.3–9.1)
SODIUM BLD-SCNC: 141 MMOL/L (ref 135–144)
TOTAL PROTEIN: 7.3 G/DL (ref 6.4–8.3)
TRIGL SERPL-MCNC: 115 MG/DL
VLDLC SERPL CALC-MCNC: ABNORMAL MG/DL (ref 1–30)
WBC # BLD: 5 K/UL (ref 3.5–11)
WBC # BLD: ABNORMAL 10*3/UL

## 2019-05-29 PROCEDURE — 86803 HEPATITIS C AB TEST: CPT

## 2019-05-29 PROCEDURE — 80061 LIPID PANEL: CPT

## 2019-05-29 PROCEDURE — 36415 COLL VENOUS BLD VENIPUNCTURE: CPT

## 2019-05-29 PROCEDURE — 85025 COMPLETE CBC W/AUTO DIFF WBC: CPT

## 2019-05-29 PROCEDURE — 80053 COMPREHEN METABOLIC PANEL: CPT

## 2019-05-30 ENCOUNTER — OFFICE VISIT (OUTPATIENT)
Dept: FAMILY MEDICINE CLINIC | Age: 69
End: 2019-05-30
Payer: MEDICARE

## 2019-05-30 ENCOUNTER — HOSPITAL ENCOUNTER (OUTPATIENT)
Age: 69
Setting detail: SPECIMEN
Discharge: HOME | End: 2019-05-30
Payer: MEDICARE

## 2019-05-30 VITALS
WEIGHT: 187.4 LBS | HEIGHT: 67 IN | SYSTOLIC BLOOD PRESSURE: 127 MMHG | OXYGEN SATURATION: 100 % | HEART RATE: 64 BPM | BODY MASS INDEX: 29.41 KG/M2 | DIASTOLIC BLOOD PRESSURE: 84 MMHG

## 2019-05-30 DIAGNOSIS — E78.2 MIXED HYPERLIPIDEMIA: ICD-10-CM

## 2019-05-30 DIAGNOSIS — D64.9 MILD ANEMIA: ICD-10-CM

## 2019-05-30 DIAGNOSIS — I10 ESSENTIAL HYPERTENSION: ICD-10-CM

## 2019-05-30 DIAGNOSIS — E11.9 TYPE 2 DIABETES MELLITUS WITHOUT COMPLICATION, WITHOUT LONG-TERM CURRENT USE OF INSULIN (HCC): ICD-10-CM

## 2019-05-30 LAB
ABSOLUTE EOS #: 0.2 K/UL (ref 0–0.4)
ABSOLUTE IMMATURE GRANULOCYTE: ABNORMAL K/UL (ref 0–0.3)
ABSOLUTE LYMPH #: 1.2 K/UL (ref 1–4.8)
ABSOLUTE MONO #: 0.3 K/UL (ref 0.1–1.3)
BASOPHILS # BLD: 1 % (ref 0–2)
BASOPHILS ABSOLUTE: 0 K/UL (ref 0–0.2)
DIFFERENTIAL TYPE: ABNORMAL
EOSINOPHILS RELATIVE PERCENT: 4 % (ref 0–4)
FERRITIN: 280 UG/L (ref 30–400)
FOLATE: >20 NG/ML
HBA1C MFR BLD: 6.1 %
HCT VFR BLD CALC: 38.8 % (ref 41–53)
HEMOGLOBIN: 13 G/DL (ref 13.5–17.5)
IMMATURE GRANULOCYTES: ABNORMAL %
IRON SATURATION: 25 % (ref 20–55)
IRON: 68 UG/DL (ref 59–158)
LYMPHOCYTES # BLD: 23 % (ref 24–44)
MCH RBC QN AUTO: 30.2 PG (ref 26–34)
MCHC RBC AUTO-ENTMCNC: 33.4 G/DL (ref 31–37)
MCV RBC AUTO: 90.4 FL (ref 80–100)
MONOCYTES # BLD: 7 % (ref 1–7)
NRBC AUTOMATED: ABNORMAL PER 100 WBC
PDW BLD-RTO: 14.5 % (ref 11.5–14.9)
PLATELET # BLD: 181 K/UL (ref 150–450)
PLATELET ESTIMATE: ABNORMAL
PMV BLD AUTO: 9.1 FL (ref 6–12)
RBC # BLD: 4.29 M/UL (ref 4.5–5.9)
RBC # BLD: ABNORMAL 10*6/UL
SEG NEUTROPHILS: 65 % (ref 36–66)
SEGMENTED NEUTROPHILS ABSOLUTE COUNT: 3.4 K/UL (ref 1.3–9.1)
TOTAL IRON BINDING CAPACITY: 276 UG/DL (ref 250–450)
UNSATURATED IRON BINDING CAPACITY: 208 UG/DL (ref 112–347)
VITAMIN B-12: 387 PG/ML (ref 232–1245)
WBC # BLD: 5.2 K/UL (ref 3.5–11)
WBC # BLD: ABNORMAL 10*3/UL

## 2019-05-30 PROCEDURE — 3017F COLORECTAL CA SCREEN DOC REV: CPT | Performed by: FAMILY MEDICINE

## 2019-05-30 PROCEDURE — 1123F ACP DISCUSS/DSCN MKR DOCD: CPT | Performed by: FAMILY MEDICINE

## 2019-05-30 PROCEDURE — 83550 IRON BINDING TEST: CPT

## 2019-05-30 PROCEDURE — 4040F PNEUMOC VAC/ADMIN/RCVD: CPT | Performed by: FAMILY MEDICINE

## 2019-05-30 PROCEDURE — 99214 OFFICE O/P EST MOD 30 MIN: CPT | Performed by: FAMILY MEDICINE

## 2019-05-30 PROCEDURE — 82746 ASSAY OF FOLIC ACID SERUM: CPT

## 2019-05-30 PROCEDURE — 82607 VITAMIN B-12: CPT

## 2019-05-30 PROCEDURE — G8417 CALC BMI ABV UP PARAM F/U: HCPCS | Performed by: FAMILY MEDICINE

## 2019-05-30 PROCEDURE — G8427 DOCREV CUR MEDS BY ELIG CLIN: HCPCS | Performed by: FAMILY MEDICINE

## 2019-05-30 PROCEDURE — 82728 ASSAY OF FERRITIN: CPT

## 2019-05-30 PROCEDURE — 2022F DILAT RTA XM EVC RTNOPTHY: CPT | Performed by: FAMILY MEDICINE

## 2019-05-30 PROCEDURE — 1036F TOBACCO NON-USER: CPT | Performed by: FAMILY MEDICINE

## 2019-05-30 PROCEDURE — 83036 HEMOGLOBIN GLYCOSYLATED A1C: CPT | Performed by: FAMILY MEDICINE

## 2019-05-30 PROCEDURE — 83540 ASSAY OF IRON: CPT

## 2019-05-30 PROCEDURE — 85025 COMPLETE CBC W/AUTO DIFF WBC: CPT

## 2019-05-30 PROCEDURE — 36415 COLL VENOUS BLD VENIPUNCTURE: CPT

## 2019-05-30 PROCEDURE — 3044F HG A1C LEVEL LT 7.0%: CPT | Performed by: FAMILY MEDICINE

## 2019-05-30 ASSESSMENT — ENCOUNTER SYMPTOMS
ABDOMINAL DISTENTION: 0
VOMITING: 0
SINUS PRESSURE: 0
BACK PAIN: 0
SHORTNESS OF BREATH: 0
BLOOD IN STOOL: 0
RHINORRHEA: 0
CHEST TIGHTNESS: 0
COUGH: 0
CONSTIPATION: 0
WHEEZING: 0
PHOTOPHOBIA: 0

## 2019-05-30 NOTE — PROGRESS NOTES
Chief Complaint   Patient presents with    Diabetes    Hypertension    Hyperlipidemia         Nikolas Erie  here today for follow up on chronic medical problems, go over labs and/or diagnostic studies, and medication refills. Diabetes; Hypertension; and Hyperlipidemia      HPI: Patient is here for hypertension and diabetes,    Diabetes A1c is mildly increased, but still controlled. Patient is compliant with medications. Hypertension controlled denies any chest pain shortness of breath. Hyperlipidemia stable. Patient had blood work done that showed mild anemia had recent colonoscopy done which was normal.  All other blood work is normal.  Patient denies any fatigue or weight loss    Patient reports he has some small spots on his back which are since years they come and go and results a self. They're in the form of small pimples. /84   Pulse 64   Ht 5' 7\" (1.702 m)   Wt 187 lb 6.4 oz (85 kg)   SpO2 100% Comment: resting @ RA  BMI 29.35 kg/m²    Body mass index is 29.35 kg/m². Wt Readings from Last 3 Encounters:   05/30/19 187 lb 6.4 oz (85 kg)   02/14/19 182 lb 6.4 oz (82.7 kg)   01/17/19 176 lb 9.6 oz (80.1 kg)        [x]Negative depression screening. PHQ Scores 2/14/2019 11/14/2018 4/5/2018 4/5/2017   PHQ2 Score 0 - 0 0   PHQ9 Score 0 0 0 0      []1-4 = Minimal depression   []5-9 = Milddepression   []10-14 = Moderate depression   []15-19 = Moderately severe depression   []20-27 = Severe depression    Discussed testing with the patient and all questions fully answered.     Hospital Outpatient Visit on 05/29/2019   Component Date Value Ref Range Status    Glucose 05/29/2019 122* 70 - 99 mg/dL Final    BUN 05/29/2019 19  8 - 23 mg/dL Final    CREATININE 05/29/2019 0.80  0.70 - 1.20 mg/dL Final    Bun/Cre Ratio 05/29/2019 NOT REPORTED  9 - 20 Final    Calcium 05/29/2019 9.4  8.6 - 10.4 mg/dL Final    Sodium 05/29/2019 141  135 - 144 mmol/L Final    Potassium 05/29/2019 4.1  3.7 - 5.3 mmol/L Final    Chloride 05/29/2019 102  98 - 107 mmol/L Final    CO2 05/29/2019 27  20 - 31 mmol/L Final    Anion Gap 05/29/2019 12  9 - 17 mmol/L Final    Alkaline Phosphatase 05/29/2019 43  40 - 129 U/L Final    ALT 05/29/2019 39  5 - 41 U/L Final    AST 05/29/2019 28  <40 U/L Final    Total Bilirubin 05/29/2019 0.45  0.3 - 1.2 mg/dL Final    Total Protein 05/29/2019 7.3  6.4 - 8.3 g/dL Final    Alb 05/29/2019 4.2  3.5 - 5.2 g/dL Final    Albumin/Globulin Ratio 05/29/2019 NOT REPORTED  1.0 - 2.5 Final    GFR Non- 05/29/2019 >60  >60 mL/min Final    GFR  05/29/2019 >60  >60 mL/min Final    GFR Comment 05/29/2019        Final    Comment: Average GFR for 61-76 years old:   80 mL/min/1.73sq m  Chronic Kidney Disease:   <60 mL/min/1.73sq m  Kidney failure:   <15 mL/min/1.73sq m              eGFR calculated using average adult body mass.  Additional eGFR calculator available at:        [email or web address]            GFR Staging 05/29/2019 NOT REPORTED   Final    WBC 05/29/2019 5.0  3.5 - 11.0 k/uL Final    RBC 05/29/2019 4.38* 4.5 - 5.9 m/uL Final    Hemoglobin 05/29/2019 13.4* 13.5 - 17.5 g/dL Final    Hematocrit 05/29/2019 39.9* 41 - 53 % Final    MCV 05/29/2019 91.1  80 - 100 fL Final    MCH 05/29/2019 30.6  26 - 34 pg Final    MCHC 05/29/2019 33.6  31 - 37 g/dL Final    RDW 05/29/2019 14.9  11.5 - 14.9 % Final    Platelets 81/17/3105 178  150 - 450 k/uL Final    MPV 05/29/2019 9.5  6.0 - 12.0 fL Final    NRBC Automated 05/29/2019 NOT REPORTED  per 100 WBC Final    Differential Type 05/29/2019 NOT REPORTED   Final    Seg Neutrophils 05/29/2019 64  36 - 66 % Final    Lymphocytes 05/29/2019 26  24 - 44 % Final    Monocytes 05/29/2019 5  1 - 7 % Final    Eosinophils % 05/29/2019 4  0 - 4 % Final    Basophils 05/29/2019 1  0 - 2 % Final    Immature Granulocytes 05/29/2019 NOT REPORTED  0 % Final    Segs Absolute 05/29/2019 TWICE DAILY 180 capsule 3    losartan (COZAAR) 100 MG tablet TAKE 1 TABLET DAILY 90 tablet 3    triamterene-hydrochlorothiazide (DYAZIDE) 37.5-25 MG per capsule TAKE 1 CAPSULE EVERY       MORNING 90 capsule 1    vitamin D (CHOLECALCIFEROL) 1000 UNIT TABS tablet Take 1,000 Units by mouth daily      fluticasone (FLONASE) 50 MCG/ACT nasal spray 1 spray by Nasal route daily      loratadine (CLARITIN) 10 MG tablet Take 10 mg by mouth daily as needed       folic acid (FOLVITE) 1 MG tablet Take 1 mg by mouth daily      methotrexate (RHEUMATREX) 2.5 MG chemo tablet Take 2.5 mg by mouth once a week 10 tabs weekly      inFLIXimab (REMICADE) 100 MG injection Infuse 5 mg/kg intravenously See Admin Instructions Every 8 weeks      sitaGLIPtan-metformin (JANUMET)  MG per tablet Take 1 tablet by mouth 2 times daily (with meals) 180 tablet 3     No current facility-administered medications for this visit.               Social History     Socioeconomic History    Marital status:      Spouse name: Not on file    Number of children: Not on file    Years of education: Not on file    Highest education level: Not on file   Occupational History    Not on file   Social Needs    Financial resource strain: Not on file    Food insecurity:     Worry: Not on file     Inability: Not on file    Transportation needs:     Medical: Not on file     Non-medical: Not on file   Tobacco Use    Smoking status: Never Smoker    Smokeless tobacco: Never Used   Substance and Sexual Activity    Alcohol use: No     Alcohol/week: 0.0 oz    Drug use: No    Sexual activity: Yes     Partners: Female   Lifestyle    Physical activity:     Days per week: Not on file     Minutes per session: Not on file    Stress: Not on file   Relationships    Social connections:     Talks on phone: Not on file     Gets together: Not on file     Attends Bahai service: Not on file     Active member of club or organization: Not on file Attends meetings of clubs or organizations: Not on file     Relationship status: Not on file    Intimate partner violence:     Fear of current or ex partner: Not on file     Emotionally abused: Not on file     Physically abused: Not on file     Forced sexual activity: Not on file   Other Topics Concern    Not on file   Social History Narrative    Not on file     Counseling given: Yes        Family History   Problem Relation Age of Onset    Other Mother         mild dysplasia    Cancer Father         throat, lung             -rest of complaints with corresponding details per ROS    The patient's past medical, surgical, social, and family history as well as his current medications and allergies were reviewed as documented intoday's encounter. Review of Systems   Constitutional: Negative for activity change, appetite change, fatigue and unexpected weight change. HENT: Negative for congestion, rhinorrhea and sinus pressure. Eyes: Negative for photophobia and visual disturbance. Respiratory: Negative for cough, chest tightness, shortness of breath and wheezing. Cardiovascular: Negative for chest pain and palpitations. Gastrointestinal: Negative for abdominal distention, blood in stool, constipation and vomiting. Endocrine: Negative for polyphagia and polyuria. Genitourinary: Negative for difficulty urinating, flank pain, frequency, hematuria and urgency. Musculoskeletal: Negative for arthralgias, back pain and myalgias. Skin: Positive for rash. Neurological: Negative for dizziness, weakness and numbness. Psychiatric/Behavioral: Negative for agitation, decreased concentration and dysphoric mood. Physical Exam   Skin: Rash noted. Rash is papular. There are 2 blackheads seen.        PHYSICAL EXAM:   VITALS:   Vitals:    05/30/19 0921   BP: 127/84   Pulse: 64   SpO2: 100%     GENERAL:  Patient is a well-developed, well-nourished male  in no acute distress, alert and oriented x3, appropriate and pleasant conversation. HEAD: Normocephalic, atraumatic. EYES: Pupils equal, round and reactive to light and accommodation, extraocular   movements intact. ENT: Moist mucous membranes. No erythema is noted. NECK: Supple. No masses. No lymphadenopathy. CARDIOVASCULAR: Regular rate and rhythm. PULMONARY: Lungs are clear to auscultation bilaterally. ABDOMEN: Soft, nontender, nondistended. Positive bowel sounds. MUSCULOSKELETAL: Strength 4/5 bilaterally in all extremities. No tenderness to   palpation of the ribs, long bones, or spine. NEUROLOGIC: Cranial nerves II through XII grossly intact. No focal deficits are noted. ASSESSMENT AND PLAN      1. Type 2 diabetes mellitus without complication, without long-term current use of insulin (HCC)  -controlled continue same medications  - POCT glycosylated hemoglobin (Hb A1C)    2. Essential hypertension  -controlled continue same medications    3. Mild anemia  Mild anemia, will do further testing  - Iron And TIBC; Future  - Path Review, Smear; Future  - Vitamin B12 & Folate; Future  - Ferritin; Future    4. Mixed hyperlipidemia  Stable continue same medications        Orders Placed This Encounter   Procedures    Iron And TIBC     Standing Status:   Future     Standing Expiration Date:   5/30/2020     Order Specific Question:   Is Patient Fasting? Answer:   no     Order Specific Question:   No of Hours?      Answer:   no    Path Review, Smear     Standing Status:   Future     Standing Expiration Date:   5/30/2020    Vitamin B12 & Folate     Standing Status:   Future     Standing Expiration Date:   5/30/2020    Ferritin     Standing Status:   Future     Standing Expiration Date:   5/30/2020    POCT glycosylated hemoglobin (Hb A1C)         Medications Discontinued During This Encounter   Medication Reason    FLUAD 0.5 ML NANDO Therapy completed    PNEUMOVAX 23 25 MCG/0.5ML inj Therapy completed       Carlos Enrique received counseling on the following healthy behaviors: nutrition, exercise and medication adherence  Reviewed prior labs and health maintenance  Continue current medications, diet and exercise. Discussed use, benefit, and side effects of prescribed medications. Barriers to medication compliance addressed. Patient given educational materials - see patient instructions  Was a self-tracking handout given in paper form or via [employer]? Yes    Requested Prescriptions      No prescriptions requested or ordered in this encounter       All patient questions answered. Patient voiced understanding. Quality Measures    Body mass index is 29.35 kg/m². Elevated. Weight control planned discussed Healthy diet and regular exercise. BP: 127/84. Blood pressure is normal. Treatment plan consists of No treatment change needed. Fall Risk 2/14/2019 4/5/2018 4/5/2017   2 or more falls in past year? no no no   Fall with injury in past year? no no no     The patient does not have a history of falls. I did , complete a risk assessment for falls. A plan of care for falls in-office gait and balance testing performed using The Timed Up and Go Test was negative for increased falls risk- no further intervention is currently indicated, No Treatment plan indicated    Lab Results   Component Value Date    LDLCHOLESTEROL 77 05/29/2019    (goal LDL reduction with dx if diabetes is 50% LDL reduction)    PHQ Scores 2/14/2019 11/14/2018 4/5/2018 4/5/2017   PHQ2 Score 0 - 0 0   PHQ9 Score 0 0 0 0     Interpretation of Total Score Depression Severity: 1-4 = Minimal depression, 5-9 = Mild depression, 10-14 = Moderate depression, 15-19 = Moderately severe depression, 20-27 = Severe depression      The patient'spast medical, surgical, social, and family history as well as his   current medications and allergies were reviewed as documented in today's encounter.       Medications, labs, diagnostic studies, consultations andfollow-up as documented in this encounter. Return in about 3 months (around 8/30/2019) for dm ,htn, hld. Patient wasseen with total face to face time of 25 minutes. More than 50% of this visit was counseling and education. Future Appointments   Date Time Provider aPpito Demi   9/3/2019 10:00 AM César Carrero MD [facility] [address] [facility]     This note was completed by using the assistance of a speech-recognition program. However, inadvertent computerized transcription errors may be present. Althoughevery effort was made to ensure accuracy, no guarantees can be provided that every mistake has been identified and corrected by editing.   Electronically signed by César Carrero MD on 5/30/2019  10:14 AM

## 2019-05-30 NOTE — PROGRESS NOTES
Visit Information    Have you changed or started any medications since your last visit including any over-the-counter medicines, vitamins, or herbal medicines? no   Are you having any side effects from any of your medications? -  no  Have you stopped taking any of your medications? Is so, why? -  no    Have you seen any other physician or provider since your last visit? Yes - Records Obtained  Have you had any other diagnostic tests since your last visit? Yes - Records Obtained  Have you been seen in the emergency room and/or had an admission to a hospital since we last saw you? No  Have you had your routine dental cleaning in the past 6 months? yes -     Have you activated your [facility] account? If not, what are your barriers?  Yes     Patient Care Team:  Altaf Sr MD as PCP - General (Family Medicine)  Altaf Sr MD as PCP - S Attributed Provider  Annetta Bunch MD as Consulting Physician (Internal Medicine)  Ivonne Lindsay MD as Consulting Physician (Gastroenterology)    Medical History Review  Past Medical, Family, and Social History reviewed and does contribute to the patient presenting condition    Health Maintenance   Topic Date Due    Hepatitis C screen  1950    Diabetic retinal exam  07/27/1962    Shingles Vaccine (1 of 2) 07/27/2002    Lipid screen  03/22/2019    Potassium monitoring  03/22/2019    Creatinine monitoring  03/22/2019    DTaP/Tdap/Td vaccine (1 - Tdap) 11/14/2019 (Originally 7/27/1971)    Diabetic microalbuminuria test  07/19/2019    Pneumococcal 65+ years Vaccine (2 of 2 - PCV13) 11/03/2019    Diabetic foot exam  11/14/2019    A1C test (Diabetic or Prediabetic)  02/14/2020    Colon cancer screen colonoscopy  12/14/2021    Flu vaccine  Completed

## 2019-06-01 LAB — PATHOLOGIST REVIEW: NORMAL

## 2019-06-10 DIAGNOSIS — E87.6 HYPOKALEMIA: ICD-10-CM

## 2019-06-10 RX ORDER — POTASSIUM CHLORIDE 750 MG/1
CAPSULE, EXTENDED RELEASE ORAL
Qty: 180 CAPSULE | Refills: 3 | Status: SHIPPED | OUTPATIENT
Start: 2019-06-10 | End: 2020-06-02

## 2019-06-10 NOTE — TELEPHONE ENCOUNTER
Please Approve or Refuse.   Send to Pharmacy per Pt's Request:      Next Visit Date:  9/3/2019   Last Visit Date: 5/30/2019    Hemoglobin A1C (%)   Date Value   05/30/2019 6.1   02/14/2019 5.5   10/19/2018 6.3 (H)             ( goal A1C is < 7)   BP Readings from Last 3 Encounters:   05/30/19 127/84   02/14/19 124/85   01/17/19 118/74          (goal 120/80)  BUN   Date Value Ref Range Status   05/29/2019 19 8 - 23 mg/dL Final     CREATININE   Date Value Ref Range Status   05/29/2019 0.80 0.70 - 1.20 mg/dL Final     Potassium   Date Value Ref Range Status   05/29/2019 4.1 3.7 - 5.3 mmol/L Final

## 2019-07-12 DIAGNOSIS — I10 ESSENTIAL HYPERTENSION: ICD-10-CM

## 2019-07-12 RX ORDER — TRIAMTERENE AND HYDROCHLOROTHIAZIDE 37.5; 25 MG/1; MG/1
1 CAPSULE ORAL DAILY
Qty: 90 CAPSULE | Refills: 1 | Status: SHIPPED | OUTPATIENT
Start: 2019-07-12 | End: 2019-12-26

## 2019-07-29 DIAGNOSIS — I10 ESSENTIAL HYPERTENSION: ICD-10-CM

## 2019-07-29 RX ORDER — LOSARTAN POTASSIUM 100 MG/1
TABLET ORAL
Qty: 90 TABLET | Refills: 3 | Status: SHIPPED | OUTPATIENT
Start: 2019-07-29 | End: 2020-08-17

## 2019-09-03 ENCOUNTER — OFFICE VISIT (OUTPATIENT)
Dept: FAMILY MEDICINE CLINIC | Age: 69
End: 2019-09-03
Payer: MEDICARE

## 2019-09-03 VITALS
DIASTOLIC BLOOD PRESSURE: 72 MMHG | BODY MASS INDEX: 29.7 KG/M2 | HEART RATE: 76 BPM | OXYGEN SATURATION: 98 % | SYSTOLIC BLOOD PRESSURE: 128 MMHG | HEIGHT: 67 IN | WEIGHT: 189.2 LBS

## 2019-09-03 DIAGNOSIS — Z23 NEED FOR INFLUENZA VACCINATION: ICD-10-CM

## 2019-09-03 DIAGNOSIS — E78.2 MIXED HYPERLIPIDEMIA: ICD-10-CM

## 2019-09-03 DIAGNOSIS — I10 ESSENTIAL HYPERTENSION: ICD-10-CM

## 2019-09-03 DIAGNOSIS — D64.9 MILD ANEMIA: ICD-10-CM

## 2019-09-03 DIAGNOSIS — E11.9 TYPE 2 DIABETES MELLITUS WITHOUT COMPLICATION, WITHOUT LONG-TERM CURRENT USE OF INSULIN (HCC): Primary | ICD-10-CM

## 2019-09-03 LAB — HBA1C MFR BLD: 6 %

## 2019-09-03 PROCEDURE — 90662 IIV NO PRSV INCREASED AG IM: CPT | Performed by: FAMILY MEDICINE

## 2019-09-03 PROCEDURE — G0008 ADMIN INFLUENZA VIRUS VAC: HCPCS | Performed by: FAMILY MEDICINE

## 2019-09-03 PROCEDURE — 4040F PNEUMOC VAC/ADMIN/RCVD: CPT | Performed by: FAMILY MEDICINE

## 2019-09-03 PROCEDURE — 3044F HG A1C LEVEL LT 7.0%: CPT | Performed by: FAMILY MEDICINE

## 2019-09-03 PROCEDURE — 99214 OFFICE O/P EST MOD 30 MIN: CPT | Performed by: FAMILY MEDICINE

## 2019-09-03 PROCEDURE — 1123F ACP DISCUSS/DSCN MKR DOCD: CPT | Performed by: FAMILY MEDICINE

## 2019-09-03 PROCEDURE — 83036 HEMOGLOBIN GLYCOSYLATED A1C: CPT | Performed by: FAMILY MEDICINE

## 2019-09-03 PROCEDURE — 1036F TOBACCO NON-USER: CPT | Performed by: FAMILY MEDICINE

## 2019-09-03 PROCEDURE — 3017F COLORECTAL CA SCREEN DOC REV: CPT | Performed by: FAMILY MEDICINE

## 2019-09-03 PROCEDURE — G8427 DOCREV CUR MEDS BY ELIG CLIN: HCPCS | Performed by: FAMILY MEDICINE

## 2019-09-03 PROCEDURE — 2022F DILAT RTA XM EVC RTNOPTHY: CPT | Performed by: FAMILY MEDICINE

## 2019-09-03 PROCEDURE — G8417 CALC BMI ABV UP PARAM F/U: HCPCS | Performed by: FAMILY MEDICINE

## 2019-09-03 ASSESSMENT — ENCOUNTER SYMPTOMS
CONSTIPATION: 0
SINUS PAIN: 0
VOMITING: 0
SINUS PRESSURE: 0
ABDOMINAL DISTENTION: 0
SHORTNESS OF BREATH: 0
COUGH: 0
ABDOMINAL PAIN: 0
CHEST TIGHTNESS: 0
PHOTOPHOBIA: 0
RHINORRHEA: 0
WHEEZING: 0
BACK PAIN: 0

## 2019-09-03 NOTE — PROGRESS NOTES
Chief Complaint   Patient presents with    3 Month Follow-Up    Diabetes         Court Katerin  here today for follow up on chronic medical problems, go over labs and/or diagnostic studies, and medication refills. 3 Month Follow-Up and Diabetes      HPI: She is here 3-month follow-up for diabetes hypertension hyperlipidemia patient    Diabetes controlled A1c is 6 stable, on Janumet. Sugars are running normal.      Hyperlipidemia stable on statins    Hypertension controlled,  Denies any chest pain shortness of breath. Patient had mild anemia on blood work, further blood work was ordered all his iron supplements, vitamin B12 and folate are normal.  Patient has normal path report patient does have history of rheumatoid arthritis and is on immunosuppressive medication Remicade. /72 (Site: Left Upper Arm, Position: Sitting, Cuff Size: Medium Adult)   Pulse 76   Ht 5' 7.01\" (1.702 m)   Wt 189 lb 3.2 oz (85.8 kg)   SpO2 98%   BMI 29.63 kg/m²    Body mass index is 29.63 kg/m². Wt Readings from Last 3 Encounters:   09/03/19 189 lb 3.2 oz (85.8 kg)   05/30/19 187 lb 6.4 oz (85 kg)   02/14/19 182 lb 6.4 oz (82.7 kg)        [x]Negative depression screening. PHQ Scores 2/14/2019 11/14/2018 4/5/2018 4/5/2017   PHQ2 Score 0 - 0 0   PHQ9 Score 0 0 0 0      []1-4 = Minimal depression   []5-9 = Milddepression   []10-14 = Moderate depression   []15-19 = Moderately severe depression   []20-27 = Severe depression    Discussed testing with the patient and all questions fully answered. Hospital Outpatient Visit on 05/30/2019   Component Date Value Ref Range Status    Iron 05/30/2019 68  59 - 158 ug/dL Final    TIBC 05/30/2019 276  250 - 450 ug/dL Final    Iron Saturation 05/30/2019 25  20 - 55 % Final    UIBC 05/30/2019 208  112 - 347 ug/dL Final    Pathologist Review 05/30/2019 TO BE REVIEWED BY PATHOLOGIST   Final    Comment: Reviewed by pathologist:  Fercho Fuller.  SUSHIL Bradley  PERIPHERAL SMEAR DEMONSTRATING strip 3    sitaGLIPtan-metformin (JANUMET)  MG per tablet Take 1 tablet by mouth 2 times daily (with meals) 180 tablet 3    vitamin D (CHOLECALCIFEROL) 1000 UNIT TABS tablet Take 1,000 Units by mouth daily      folic acid (FOLVITE) 1 MG tablet Take 1 mg by mouth daily      methotrexate (RHEUMATREX) 2.5 MG chemo tablet Take 2.5 mg by mouth once a week 10 tabs weekly      inFLIXimab (REMICADE) 100 MG injection Infuse 5 mg/kg intravenously See Admin Instructions Every 8 weeks      fluticasone (FLONASE) 50 MCG/ACT nasal spray 1 spray by Nasal route daily      loratadine (CLARITIN) 10 MG tablet Take 10 mg by mouth daily as needed        No current facility-administered medications for this visit.               Social History     Socioeconomic History    Marital status:      Spouse name: Not on file    Number of children: Not on file    Years of education: Not on file    Highest education level: Not on file   Occupational History    Not on file   Social Needs    Financial resource strain: Not on file    Food insecurity:     Worry: Not on file     Inability: Not on file    Transportation needs:     Medical: Not on file     Non-medical: Not on file   Tobacco Use    Smoking status: Never Smoker    Smokeless tobacco: Never Used   Substance and Sexual Activity    Alcohol use: No     Alcohol/week: 0.0 standard drinks    Drug use: No    Sexual activity: Yes     Partners: Female   Lifestyle    Physical activity:     Days per week: Not on file     Minutes per session: Not on file    Stress: Not on file   Relationships    Social connections:     Talks on phone: Not on file     Gets together: Not on file     Attends [employer] service: Not on file     Active member of club or organization: Not on file     Attends meetings of clubs or organizations: Not on file     Relationship status: Not on file    Intimate partner violence:     Fear of current or ex partner: Not on file     Emotionally abused:

## 2019-09-16 DIAGNOSIS — E78.2 MIXED HYPERLIPIDEMIA: ICD-10-CM

## 2019-09-16 RX ORDER — SIMVASTATIN 10 MG
TABLET ORAL
Qty: 90 TABLET | Refills: 1 | Status: SHIPPED | OUTPATIENT
Start: 2019-09-16 | End: 2020-03-09 | Stop reason: SDUPTHER

## 2019-09-17 LAB
ALBUMIN SERPL-MCNC: 4.7 G/DL
ALP BLD-CCNC: 51 U/L
ALT SERPL-CCNC: 51 U/L
ANION GAP SERPL CALCULATED.3IONS-SCNC: NORMAL MMOL/L
AST SERPL-CCNC: 35 U/L
BASOPHILS ABSOLUTE: NORMAL /ΜL
BASOPHILS RELATIVE PERCENT: NORMAL %
BILIRUB SERPL-MCNC: NORMAL MG/DL (ref 0.1–1.4)
BUN BLDV-MCNC: NORMAL MG/DL
CALCIUM SERPL-MCNC: 9.8 MG/DL
CALCIUM SERPL-MCNC: NORMAL MG/DL
CHLORIDE BLD-SCNC: NORMAL MMOL/L
CO2: NORMAL MMOL/L
CREAT SERPL-MCNC: 0.9 MG/DL
EOSINOPHILS ABSOLUTE: NORMAL /ΜL
EOSINOPHILS RELATIVE PERCENT: NORMAL %
GFR CALCULATED: 84.2
GLUCOSE BLD-MCNC: NORMAL MG/DL
HCT VFR BLD CALC: 41.3 % (ref 41–53)
HEMOGLOBIN: 14 G/DL (ref 13.5–17.5)
LYMPHOCYTES ABSOLUTE: NORMAL /ΜL
LYMPHOCYTES RELATIVE PERCENT: NORMAL %
MCH RBC QN AUTO: 30 PG
MCHC RBC AUTO-ENTMCNC: 33.9 G/DL
MCV RBC AUTO: 90 FL
MONOCYTES ABSOLUTE: NORMAL /ΜL
MONOCYTES RELATIVE PERCENT: NORMAL %
NEUTROPHILS ABSOLUTE: NORMAL /ΜL
NEUTROPHILS RELATIVE PERCENT: NORMAL %
PLATELET # BLD: 172 K/ΜL
PMV BLD AUTO: NORMAL FL
POTASSIUM SERPL-SCNC: NORMAL MMOL/L
RBC # BLD: 4.59 10^6/ΜL
SEDIMENTATION RATE, ERYTHROCYTE: 8
SODIUM BLD-SCNC: NORMAL MMOL/L
TOTAL PROTEIN: NORMAL
WBC # BLD: 5.24 10^3/ML

## 2019-10-13 DIAGNOSIS — I10 ESSENTIAL HYPERTENSION: ICD-10-CM

## 2019-10-14 RX ORDER — AMLODIPINE BESYLATE 5 MG/1
TABLET ORAL
Qty: 90 TABLET | Refills: 3 | Status: SHIPPED | OUTPATIENT
Start: 2019-10-14 | End: 2020-08-17

## 2019-10-15 DIAGNOSIS — I10 ESSENTIAL HYPERTENSION: ICD-10-CM

## 2019-10-16 RX ORDER — ATENOLOL 50 MG/1
TABLET ORAL
Qty: 135 TABLET | Refills: 3 | Status: SHIPPED | OUTPATIENT
Start: 2019-10-16 | End: 2020-08-17

## 2019-10-27 DIAGNOSIS — E11.9 TYPE 2 DIABETES MELLITUS WITHOUT COMPLICATION, WITHOUT LONG-TERM CURRENT USE OF INSULIN (HCC): ICD-10-CM

## 2019-10-28 RX ORDER — SITAGLIPTIN AND METFORMIN HYDROCHLORIDE 1000; 50 MG/1; MG/1
TABLET, FILM COATED ORAL
Qty: 180 TABLET | Refills: 3 | Status: SHIPPED | OUTPATIENT
Start: 2019-10-28 | End: 2020-08-17

## 2019-12-26 DIAGNOSIS — I10 ESSENTIAL HYPERTENSION: ICD-10-CM

## 2019-12-26 RX ORDER — TRIAMTERENE AND HYDROCHLOROTHIAZIDE 37.5; 25 MG/1; MG/1
CAPSULE ORAL
Qty: 90 CAPSULE | Refills: 1 | Status: SHIPPED | OUTPATIENT
Start: 2019-12-26 | End: 2020-04-10 | Stop reason: SDUPTHER

## 2020-02-25 ENCOUNTER — HOSPITAL ENCOUNTER (OUTPATIENT)
Age: 70
Setting detail: SPECIMEN
Discharge: HOME | End: 2020-02-25
Payer: MEDICARE

## 2020-02-25 LAB
CREATININE URINE: 162.5 MG/DL (ref 39–259)
MICROALBUMIN/CREAT 24H UR: <12 MG/L
MICROALBUMIN/CREAT UR-RTO: NORMAL MCG/MG CREAT

## 2020-02-25 PROCEDURE — 82570 ASSAY OF URINE CREATININE: CPT

## 2020-02-25 PROCEDURE — 82043 UR ALBUMIN QUANTITATIVE: CPT

## 2020-03-09 RX ORDER — SIMVASTATIN 10 MG
TABLET ORAL
Qty: 90 TABLET | Refills: 1 | Status: SHIPPED | OUTPATIENT
Start: 2020-03-09 | End: 2020-08-17

## 2020-04-13 RX ORDER — TRIAMTERENE AND HYDROCHLOROTHIAZIDE 37.5; 25 MG/1; MG/1
1 CAPSULE ORAL DAILY
Qty: 90 CAPSULE | Refills: 1 | Status: SHIPPED | OUTPATIENT
Start: 2020-04-13 | End: 2020-11-16

## 2020-06-02 RX ORDER — POTASSIUM CHLORIDE 750 MG/1
CAPSULE, EXTENDED RELEASE ORAL
Qty: 180 CAPSULE | Refills: 3 | Status: SHIPPED | OUTPATIENT
Start: 2020-06-02 | End: 2021-03-07 | Stop reason: SDUPTHER

## 2020-07-02 ENCOUNTER — OFFICE VISIT (OUTPATIENT)
Dept: FAMILY MEDICINE CLINIC | Age: 70
End: 2020-07-02
Payer: MEDICARE

## 2020-07-02 VITALS
TEMPERATURE: 98.2 F | DIASTOLIC BLOOD PRESSURE: 72 MMHG | WEIGHT: 174.2 LBS | SYSTOLIC BLOOD PRESSURE: 110 MMHG | HEART RATE: 74 BPM | BODY MASS INDEX: 27.34 KG/M2 | OXYGEN SATURATION: 98 % | HEIGHT: 67 IN

## 2020-07-02 PROCEDURE — 1123F ACP DISCUSS/DSCN MKR DOCD: CPT | Performed by: FAMILY MEDICINE

## 2020-07-02 PROCEDURE — 3046F HEMOGLOBIN A1C LEVEL >9.0%: CPT | Performed by: FAMILY MEDICINE

## 2020-07-02 PROCEDURE — 3017F COLORECTAL CA SCREEN DOC REV: CPT | Performed by: FAMILY MEDICINE

## 2020-07-02 PROCEDURE — G0439 PPPS, SUBSEQ VISIT: HCPCS | Performed by: FAMILY MEDICINE

## 2020-07-02 PROCEDURE — 4040F PNEUMOC VAC/ADMIN/RCVD: CPT | Performed by: FAMILY MEDICINE

## 2020-07-02 RX ORDER — LANCETS
1 EACH MISCELLANEOUS DAILY
Qty: 100 EACH | Refills: 3 | Status: SHIPPED | OUTPATIENT
Start: 2020-07-02 | End: 2020-07-02 | Stop reason: SDUPTHER

## 2020-07-02 RX ORDER — GLUCOSAMINE HCL/CHONDROITIN SU 500-400 MG
CAPSULE ORAL
Qty: 100 STRIP | Refills: 3 | Status: SHIPPED | OUTPATIENT
Start: 2020-07-02

## 2020-07-02 RX ORDER — LANCETS
1 EACH MISCELLANEOUS DAILY
Qty: 100 EACH | Refills: 3 | Status: SHIPPED | OUTPATIENT
Start: 2020-07-02

## 2020-07-02 RX ORDER — GLUCOSAMINE HCL/CHONDROITIN SU 500-400 MG
CAPSULE ORAL
Qty: 100 STRIP | Refills: 3 | Status: SHIPPED | OUTPATIENT
Start: 2020-07-02 | End: 2020-07-02 | Stop reason: SDUPTHER

## 2020-07-02 ASSESSMENT — PATIENT HEALTH QUESTIONNAIRE - PHQ9
SUM OF ALL RESPONSES TO PHQ QUESTIONS 1-9: 0
SUM OF ALL RESPONSES TO PHQ QUESTIONS 1-9: 0

## 2020-07-02 ASSESSMENT — LIFESTYLE VARIABLES: HOW OFTEN DO YOU HAVE A DRINK CONTAINING ALCOHOL: 0

## 2020-07-02 ASSESSMENT — VISUAL ACUITY
OD_CC: 20/13
OS_CC: 20/13

## 2020-07-02 NOTE — PATIENT INSTRUCTIONS
the tests and screenings are paid in full while other may be subject to a deductible, co-insurance, and/or copay. Some of these benefits include a comprehensive review of your medical history including lifestyle, illnesses that may run in your family, and various assessments and screenings as appropriate. After reviewing your medical record and screening and assessments performed today your provider may have ordered immunizations, labs, imaging, and/or referrals for you. A list of these orders (if applicable) as well as your Preventive Care list are included within your After Visit Summary for your review. Other Preventive Recommendations:    A preventive eye exam performed by an eye specialist is recommended every 1-2 years to screen for glaucoma; cataracts, macular degeneration, and other eye disorders. A preventive dental visit is recommended every 6 months. Try to get at least 150 minutes of exercise per week or 10,000 steps per day on a pedometer . Order or download the FREE \"Exercise & Physical Activity: Your Everyday Guide\" from [employer]. Call 5-676.614.1750 or search [employer] online. You need 9277-9473 mg of calcium and 7473-6337 IU of vitamin D per day. It is possible to meet your calcium requirement with diet alone, but a vitamin D supplement is usually necessary to meet this goal.  When exposed to the sun, use a sunscreen that protects against both UVA and UVB radiation with an SPF of 30 or greater. Reapply every 2 to 3 hours or after sweating, drying off with a towel, or swimming. Always wear a seat belt when traveling in a car. Always wear a helmet when riding a bicycle or motorcycle.

## 2020-07-02 NOTE — PROGRESS NOTES
Medicare Annual Wellness Visit  Name: Claribel Wharton Date: 2020   MRN: [id number] Sex: Male   Age: 81 y.o. Ethnicity: Non-/Non    : 1950 Race: Jessica Tanner is here for Medicare AWV and Diabetes    Screenings for behavioral, psychosocial and functional/safety risks, and cognitive dysfunction are all negative except as indicated below. These results, as well as other patient data from the [address] form, are documented in Flowsheets linked to this Encounter. No Known Allergies      Prior to Visit Medications    Medication Sig Taking? Authorizing Provider   Tdap (ADACEL) 5-2-15.5 LF-MCG/0.5 injection Inject 0.5 mLs into the muscle once for 1 dose Yes Krystal Mcnamara MD   zoster recombinant adjuvanted vaccine (SHINGRIX) 50 MCG/0.5ML SUSR injection 50 MCG IM then repeat 2-6 months. Yes Krystal Mcnamara MD   ONE TOUCH ULTRASOFT LANCETS MISC 1 each by Does not apply route daily Yes Krystal Mcnamara MD   blood glucose monitor strips Test blood sugar once daily Yes Krystal Mcnamara MD   potassium chloride (MICRO-K) 10 MEQ extended release capsule TAKE 1 CAPSULE TWICE DAILY Yes Krystal Mcnamara MD   triamterene-hydroCHLOROthiazide (DYAZIDE) 37.5-25 MG per capsule Take 1 capsule by mouth daily TAKE 1 CAPSULE DAILY Yes Krystal Mcnamara MD   simvastatin (ZOCOR) 10 MG tablet Take 1 tablet by mouthy nightly.  Yes Krystal Mcnamara MD   JANUMET  MG per tablet TAKE 1 TABLET TWICE DAILY  WITH MEALS Yes Krystal Mcnamara MD   atenolol (TENORMIN) 50 MG tablet TAKE 1 AND 1/2 TABLETS     DAILY Yes Krystal Mcnamara MD   amLODIPine (NORVASC) 5 MG tablet TAKE 1 TABLET DAILY Yes Krystal Mcnamara MD   losartan (COZAAR) 100 MG tablet TAKE 1 TABLET DAILY Yes Krystal Mcnamara MD   ONETOUCH DELICA LANCETS FINE [address] [id number] PATIENT TO TEST 2-3 TIMES DAILY Yes Historical Provider, MD   vitamin D (CHOLECALCIFEROL) 1000 UNIT TABS tablet Take 1,000 Units by mouth daily Yes Historical Provider, MD fluticasone (FLONASE) 50 MCG/ACT nasal spray 1 spray by Nasal route daily Yes Historical Provider, MD   loratadine (CLARITIN) 10 MG tablet Take 10 mg by mouth daily as needed  Yes Historical Provider, MD   folic acid (FOLVITE) 1 MG tablet Take 1 mg by mouth daily Yes Historical Provider, MD   methotrexate (RHEUMATREX) 2.5 MG chemo tablet Take 2.5 mg by mouth once a week 10 tabs weekly Yes Historical Provider, MD   inFLIXimab (REMICADE) 100 MG injection Infuse 5 mg/kg intravenously See Admin Instructions Every 8 weeks Yes Historical Provider, MD         Past Medical History:   Diagnosis Date    Borderline diabetic     History of colonoscopy 12/14/2018    multiple polyps,tubular adenoma,diverticulosis    Hyperlipidemia     Hypertension     RA ([address]oid arthritis) ([facility] [address]     Type 2 diabetes mellitus without complication ([facility] [address]        Past Surgical History:   Procedure Laterality Date    COLONOSCOPY N/A 12/14/2018    multiple polyps,tubular adenoma,diverticulosis    TONSILLECTOMY AND ADENOIDECTOMY           Family History   Problem Relation Age of Onset    Other Mother         mild dysplasia    Cancer Father         throat, lung       CareTeam (Including outside providers/suppliers regularly involved in providing care):   Patient Care Team:  Stef Leal MD as PCP - General (Family Medicine)  Stef Leal MD as PCP - [facility] Empaneled Provider  Nigel Clay MD as Consulting Physician (Internal Medicine)  Conchetta Fothergill, MD as Consulting Physician (Gastroenterology)    Wt Readings from Last 3 Encounters:   07/02/20 174 lb 3.2 oz (79 kg)   09/03/19 189 lb 3.2 oz (85.8 kg)   05/30/19 187 lb 6.4 oz (85 kg)     Vitals:    07/02/20 1541   BP: 110/72   Pulse: 74   Temp: 98.2 °F (36.8 °C)   TempSrc: Temporal   SpO2: 98%   Weight: 174 lb 3.2 oz (79 kg)   Height: 5' 7\" (1.702 m)     Body mass index is 27.28 kg/m².     Based upon direct observation of the patient, evaluation of cognition reveals recent and remote memory intact. Patient's complete Health Risk Assessment and screening values have been reviewed and are found in Flowsheets. The following problems were reviewed today and where indicated follow up appointments were made and/or referrals ordered. Positive Risk Factor Screenings with Interventions:     Health Habits/Nutrition:  Health Habits/Nutrition  Do you exercise for at least 20 minutes 2-3 times per week?: (!) No  Have you lost any weight without trying in the past 3 months?: No  Do you eat fewer than 2 meals per day?: No  Have you seen a dentist within the past year?: (!) No  Body mass index is 27.28 kg/m².   Health Habits/Nutrition Interventions:  · Inadequate physical activity:  patient is not ready to increase his/her physical activity level at this time  · Dental exam overdue:  dental referral provided    Hearing/Vision:   Visual Acuity Screening    Right eye Left eye Both eyes   Without correction:      With correction: 20/13 20/13 20/13     Hearing/Vision  Do you or your family notice any trouble with your hearing?: (!) Yes  Do you have difficulty driving, watching TV, or doing any of your daily activities because of your eyesight?: No  Have you had an eye exam within the past year?: (!) No  Hearing/Vision Interventions:  · Hearing concerns:  patient declines any further evaluation/treatment for hearing issues  · Vision concerns:  patient encouraged to make appointment with his/her eye specialist    Personalized Preventive Plan   Current Health Maintenance Status  Immunization History   Administered Date(s) Administered    Influenza Vaccine, unspecified formulation 12/16/2016    Influenza, High Dose (Fluzone 65 yrs and older) 11/03/2017, 11/03/2018, 09/03/2019    Pneumococcal Polysaccharide ([id number]) 11/03/2018        Health Maintenance   Topic Date Due    Diabetic retinal exam  07/27/1962    DTaP/Tdap/Td vaccine (1 - Tdap) 07/27/1971    Shingles Vaccine (1 of 2) 07/27/2002    Annual Wellness Visit (AWV)  05/29/2019    Diabetic foot exam  11/14/2019    Lipid screen  05/29/2020    Flu vaccine (1) 09/01/2020    A1C test (Diabetic or Prediabetic)  09/03/2020    Potassium monitoring  09/17/2020    Creatinine monitoring  09/17/2020    Diabetic microalbuminuria test  02/25/2021    Colon cancer screen colonoscopy  12/14/2021    Pneumococcal 65+ years Vaccine  Completed    Hepatitis C screen  Completed    Hepatitis A vaccine  Aged Out    Hib vaccine  Aged Out    Meningococcal (ACWY) vaccine  Aged Out     Recommendations for [employer] Due: see orders and patient instructions/AVS.  . Recommended screening schedule for the next 5-10 years is provided to the patient in written form: see Patient Instructions/AVS.    Megan Reillynatalya was seen today for medicare awv and diabetes. Diagnoses and all orders for this visit:    Medicare annual wellness visit, initial    Type 2 diabetes mellitus without complication, without long-term current use of insulin ([facility] [address]  -     Hemoglobin A1C; Future  -     Discontinue: blood glucose monitor strips; Test blood sugar once daily  -     Discontinue: ONE TOUCH ULTRASOFT LANCETS MISC; 1 each by Does not apply route daily  -     Lipid Panel; Future  -     Hepatic Function Panel; Future  -     ONE TOUCH ULTRASOFT LANCETS MISC; 1 each by Does not apply route daily  -     blood glucose monitor strips; Test blood sugar once daily    Hearing problem of both ears  Patient refused ENT referral  Rheumatoid arthritis involving multiple joints ([address]    Need for prophylactic vaccination against diphtheria-tetanus-pertussis (DTP)  -     Tdap (ADACEL) 5-2-15.5 LF-MCG/0.5 injection; Inject 0.5 mLs into the muscle once for 1 dose    Need for prophylactic vaccination and inoculation against varicella  -     zoster recombinant adjuvanted vaccine (SHINGRIX) 50 MCG/0.5ML SUSR injection; 50 MCG IM then repeat 2-6 months.     Routine general medical examination at a [facility] facility    Other orders  -     Lipid, Fasting;  Future

## 2020-07-02 NOTE — PROGRESS NOTES
and Social History reviewed and does contribute to the patient presenting condition    Health Maintenance   Topic Date Due    Diabetic retinal exam  07/27/1962    DTaP/Tdap/Td vaccine (1 - Tdap) 07/27/1971    Shingles Vaccine (1 of 2) 07/27/2002    Annual Wellness Visit (AWV)  05/29/2019    Diabetic foot exam  11/14/2019    Lipid screen  05/29/2020    Flu vaccine (1) 09/01/2020    A1C test (Diabetic or Prediabetic)  09/03/2020    Potassium monitoring  09/17/2020    Creatinine monitoring  09/17/2020    Diabetic microalbuminuria test  02/25/2021    Colon cancer screen colonoscopy  12/14/2021    Pneumococcal 65+ years Vaccine  Completed    Hepatitis C screen  Completed    Hepatitis A vaccine  Aged Out    Hib vaccine  Aged Out    Meningococcal (ACWY) vaccine  Aged Out

## 2020-07-06 ENCOUNTER — HOSPITAL ENCOUNTER (OUTPATIENT)
Age: 70
Setting detail: SPECIMEN
Discharge: HOME | End: 2020-07-06
Payer: MEDICARE

## 2020-07-06 LAB
ALBUMIN SERPL-MCNC: 4 G/DL (ref 3.5–5.2)
ALBUMIN/GLOBULIN RATIO: NORMAL (ref 1–2.5)
ALP BLD-CCNC: 43 U/L (ref 40–129)
ALT SERPL-CCNC: 24 U/L (ref 5–41)
ANION GAP SERPL CALCULATED.3IONS-SCNC: 11 MMOL/L (ref 9–17)
AST SERPL-CCNC: 24 U/L
BILIRUB SERPL-MCNC: 0.73 MG/DL (ref 0.3–1.2)
BILIRUBIN DIRECT: 0.19 MG/DL
BILIRUBIN, INDIRECT: 0.54 MG/DL (ref 0–1)
BUN BLDV-MCNC: 24 MG/DL (ref 8–23)
BUN/CREAT BLD: ABNORMAL (ref 9–20)
CALCIUM SERPL-MCNC: 9.3 MG/DL (ref 8.6–10.4)
CHLORIDE BLD-SCNC: 105 MMOL/L (ref 98–107)
CHOLESTEROL/HDL RATIO: 4
CHOLESTEROL: 137 MG/DL
CO2: 25 MMOL/L (ref 20–31)
CREAT SERPL-MCNC: 1.01 MG/DL (ref 0.7–1.2)
ESTIMATED AVERAGE GLUCOSE: 117 MG/DL
GFR AFRICAN AMERICAN: >60 ML/MIN
GFR NON-AFRICAN AMERICAN: >60 ML/MIN
GFR SERPL CREATININE-BSD FRML MDRD: ABNORMAL ML/MIN/{1.73_M2}
GFR SERPL CREATININE-BSD FRML MDRD: ABNORMAL ML/MIN/{1.73_M2}
GLOBULIN: NORMAL G/DL (ref 1.5–3.8)
GLUCOSE BLD-MCNC: 111 MG/DL (ref 70–99)
HBA1C MFR BLD: 5.7 % (ref 4–6)
HDLC SERPL-MCNC: 34 MG/DL
LDL CHOLESTEROL: 76 MG/DL (ref 0–130)
POTASSIUM SERPL-SCNC: 3.9 MMOL/L (ref 3.7–5.3)
SODIUM BLD-SCNC: 141 MMOL/L (ref 135–144)
TOTAL PROTEIN: 7.1 G/DL (ref 6.4–8.3)
TRIGL SERPL-MCNC: 134 MG/DL
VLDLC SERPL CALC-MCNC: ABNORMAL MG/DL (ref 1–30)

## 2020-07-06 PROCEDURE — 80061 LIPID PANEL: CPT

## 2020-07-06 PROCEDURE — 36415 COLL VENOUS BLD VENIPUNCTURE: CPT

## 2020-07-06 PROCEDURE — 83036 HEMOGLOBIN GLYCOSYLATED A1C: CPT

## 2020-07-06 PROCEDURE — 80048 BASIC METABOLIC PNL TOTAL CA: CPT

## 2020-07-06 PROCEDURE — 80076 HEPATIC FUNCTION PANEL: CPT

## 2020-08-17 RX ORDER — SIMVASTATIN 10 MG
TABLET ORAL
Qty: 90 TABLET | Refills: 1 | Status: SHIPPED | OUTPATIENT
Start: 2020-08-17 | End: 2021-03-07 | Stop reason: SDUPTHER

## 2020-08-17 RX ORDER — SITAGLIPTIN AND METFORMIN HYDROCHLORIDE 1000; 50 MG/1; MG/1
TABLET, FILM COATED ORAL
Qty: 180 TABLET | Refills: 3 | Status: SHIPPED | OUTPATIENT
Start: 2020-08-17 | End: 2021-01-05 | Stop reason: SDUPTHER

## 2020-08-17 RX ORDER — ATENOLOL 50 MG/1
TABLET ORAL
Qty: 135 TABLET | Refills: 3 | Status: SHIPPED | OUTPATIENT
Start: 2020-08-17 | End: 2021-02-11 | Stop reason: SDUPTHER

## 2020-08-17 RX ORDER — AMLODIPINE BESYLATE 5 MG/1
TABLET ORAL
Qty: 90 TABLET | Refills: 3 | Status: SHIPPED | OUTPATIENT
Start: 2020-08-17 | End: 2021-03-07 | Stop reason: SDUPTHER

## 2020-08-17 RX ORDER — LOSARTAN POTASSIUM 100 MG/1
TABLET ORAL
Qty: 90 TABLET | Refills: 3 | Status: SHIPPED | OUTPATIENT
Start: 2020-08-17 | End: 2021-03-07 | Stop reason: SDUPTHER

## 2020-11-16 RX ORDER — TRIAMTERENE AND HYDROCHLOROTHIAZIDE 37.5; 25 MG/1; MG/1
CAPSULE ORAL
Qty: 90 CAPSULE | Refills: 1 | Status: SHIPPED | OUTPATIENT
Start: 2020-11-16 | End: 2021-06-20 | Stop reason: SDUPTHER

## 2020-11-16 NOTE — TELEPHONE ENCOUNTER
Please Approve or Refuse.   Send to Pharmacy per Pt's Request:      Next Visit Date:  1/5/2021   Last Visit Date: 7/2/2020    Hemoglobin A1C (%)   Date Value   07/06/2020 5.7   09/03/2019 6.0   05/30/2019 6.1             ( goal A1C is < 7)   BP Readings from Last 3 Encounters:   07/02/20 110/72   09/03/19 128/72   05/30/19 127/84          (goal 120/80)  BUN   Date Value Ref Range Status   07/06/2020 24 (H) 8 - 23 mg/dL Final     CREATININE   Date Value Ref Range Status   07/06/2020 1.01 0.70 - 1.20 mg/dL Final     Potassium   Date Value Ref Range Status   07/06/2020 3.9 3.7 - 5.3 mmol/L Final

## 2021-01-05 ENCOUNTER — OFFICE VISIT (OUTPATIENT)
Dept: FAMILY MEDICINE CLINIC | Age: 71
End: 2021-01-05
Payer: MEDICARE

## 2021-01-05 VITALS
HEART RATE: 83 BPM | DIASTOLIC BLOOD PRESSURE: 77 MMHG | BODY MASS INDEX: 25.33 KG/M2 | HEIGHT: 67 IN | OXYGEN SATURATION: 99 % | TEMPERATURE: 96.9 F | SYSTOLIC BLOOD PRESSURE: 128 MMHG | WEIGHT: 161.4 LBS

## 2021-01-05 DIAGNOSIS — M06.9 RHEUMATOID ARTHRITIS INVOLVING MULTIPLE JOINTS (HCC): ICD-10-CM

## 2021-01-05 DIAGNOSIS — E55.9 VITAMIN D DEFICIENCY: ICD-10-CM

## 2021-01-05 DIAGNOSIS — I10 ESSENTIAL HYPERTENSION: ICD-10-CM

## 2021-01-05 DIAGNOSIS — E78.2 MIXED HYPERLIPIDEMIA: ICD-10-CM

## 2021-01-05 DIAGNOSIS — E11.9 TYPE 2 DIABETES MELLITUS WITHOUT COMPLICATION, WITHOUT LONG-TERM CURRENT USE OF INSULIN (HCC): Primary | ICD-10-CM

## 2021-01-05 DIAGNOSIS — Z12.5 SCREENING FOR PROSTATE CANCER: ICD-10-CM

## 2021-01-05 PROBLEM — R19.5 POSITIVE FIT (FECAL IMMUNOCHEMICAL TEST): Status: RESOLVED | Noted: 2018-10-05 | Resolved: 2021-01-05

## 2021-01-05 LAB — HBA1C MFR BLD: 5.3 %

## 2021-01-05 PROCEDURE — 83036 HEMOGLOBIN GLYCOSYLATED A1C: CPT | Performed by: FAMILY MEDICINE

## 2021-01-05 PROCEDURE — 99214 OFFICE O/P EST MOD 30 MIN: CPT | Performed by: FAMILY MEDICINE

## 2021-01-05 PROCEDURE — 2022F DILAT RTA XM EVC RTNOPTHY: CPT | Performed by: FAMILY MEDICINE

## 2021-01-05 PROCEDURE — G8427 DOCREV CUR MEDS BY ELIG CLIN: HCPCS | Performed by: FAMILY MEDICINE

## 2021-01-05 PROCEDURE — G8417 CALC BMI ABV UP PARAM F/U: HCPCS | Performed by: FAMILY MEDICINE

## 2021-01-05 PROCEDURE — G8482 FLU IMMUNIZE ORDER/ADMIN: HCPCS | Performed by: FAMILY MEDICINE

## 2021-01-05 PROCEDURE — 1123F ACP DISCUSS/DSCN MKR DOCD: CPT | Performed by: FAMILY MEDICINE

## 2021-01-05 PROCEDURE — 1036F TOBACCO NON-USER: CPT | Performed by: FAMILY MEDICINE

## 2021-01-05 PROCEDURE — 3017F COLORECTAL CA SCREEN DOC REV: CPT | Performed by: FAMILY MEDICINE

## 2021-01-05 PROCEDURE — 3044F HG A1C LEVEL LT 7.0%: CPT | Performed by: FAMILY MEDICINE

## 2021-01-05 PROCEDURE — 4040F PNEUMOC VAC/ADMIN/RCVD: CPT | Performed by: FAMILY MEDICINE

## 2021-01-05 RX ORDER — SITAGLIPTIN AND METFORMIN HYDROCHLORIDE 1000; 50 MG/1; MG/1
TABLET, FILM COATED ORAL
Qty: 90 TABLET | Refills: 1 | Status: SHIPPED | OUTPATIENT
Start: 2021-01-05 | End: 2022-01-06 | Stop reason: ALTCHOICE

## 2021-01-05 ASSESSMENT — PATIENT HEALTH QUESTIONNAIRE - PHQ9
SUM OF ALL RESPONSES TO PHQ QUESTIONS 1-9: 0
1. LITTLE INTEREST OR PLEASURE IN DOING THINGS: 0
SUM OF ALL RESPONSES TO PHQ QUESTIONS 1-9: 0

## 2021-01-05 NOTE — PROGRESS NOTES
Chief Complaint   Patient presents with    6 Month Follow-Up    Diabetes    Other     NO CONCERNS         Petra Kearney  here today for follow up on chronic medical problems, go over labs and/or diagnostic studies, and medication refills. 6 Month Follow-Up, Diabetes, and Other (NO CONCERNS)      HPI: Patient is here for follow-up for diabetes controlled A1c has decreased to 5.3 patient is on Janumet twice daily. Reports he takes sometimes once a day. His sugars are running below 100. He denies any hypoglycemic episodes or hyperglycemic episodes. He has seen ophthalmologist this year. Hypertension controlled denies any chest pain shortness of breath. Hyperlipidemia stable on statins. Recent blood work is normal    Patient also has rheumatoid arthritis is on immunosuppressive medications follows with rheumatologist.      Vitamin D deficiency on vitamin D. Patient is up-to-date on health screening. /77   Pulse 83   Temp 96.9 °F (36.1 °C)   Ht 5' 7\" (1.702 m)   Wt 161 lb 6.4 oz (73.2 kg)   SpO2 99%   BMI 25.28 kg/m²    Body mass index is 25.28 kg/m². Wt Readings from Last 3 Encounters:   01/05/21 161 lb 6.4 oz (73.2 kg)   07/02/20 174 lb 3.2 oz (79 kg)   09/03/19 189 lb 3.2 oz (85.8 kg)        [x]Negative depression screening. PHQ Scores 1/5/2021 7/2/2020 2/14/2019 11/14/2018 4/5/2018 4/5/2017   PHQ2 Score 0 0 0 - 0 0   PHQ9 Score 0 0 0 0 0 0      []1-4 = Minimal depression   []5-9 = Milddepression   []10-14 = Moderate depression   []15-19 = Moderately severe depression   []20-27 = Severe depression    Discussed testing with the patient and all questions fully answered.     Hospital Outpatient Visit on 07/06/2020   Component Date Value Ref Range Status    Alb 07/06/2020 4.0  3.5 - 5.2 g/dL Final    Alkaline Phosphatase 07/06/2020 43  40 - 129 U/L Final    ALT 07/06/2020 24  5 - 41 U/L Final    AST 07/06/2020 24  <40 U/L Final  Total Bilirubin 07/06/2020 0.73  0.3 - 1.2 mg/dL Final    Bilirubin, Direct 07/06/2020 0.19  <0.31 mg/dL Final    Bilirubin, Indirect 07/06/2020 0.54  0.00 - 1.00 mg/dL Final    Total Protein 07/06/2020 7.1  6.4 - 8.3 g/dL Final    Globulin 07/06/2020 NOT REPORTED  1.5 - 3.8 g/dL Final    Albumin/Globulin Ratio 07/06/2020 NOT REPORTED  1.0 - 2.5 Final    Cholesterol 07/06/2020 137  <200 mg/dL Final    Comment:    Cholesterol Guidelines:      <200  Desirable   200-240  Borderline      >240  Undesirable         HDL 07/06/2020 34* >40 mg/dL Final    Comment:    HDL Guidelines:    <40     Undesirable   40-59    Borderline    >59     Desirable         LDL Cholesterol 07/06/2020 76  0 - 130 mg/dL Final    Comment:    LDL Guidelines:     <100    Desirable   100-129   Near to/above Desirable   130-159   Borderline      >159   Undesirable     Direct (measured) LDL and calculated LDL are not interchangeable tests.  Chol/HDL Ratio 07/06/2020 4.0  <5 Final            Triglycerides 07/06/2020 134  <150 mg/dL Final    Comment:    Triglyceride Guidelines:     <150   Desirable   150-199  Borderline   200-499  High     >499   Very high   Based on AHA Guidelines for fasting triglyceride, October 2012.  VLDL 07/06/2020 NOT REPORTED  1 - 30 mg/dL Final    Hemoglobin A1C 07/06/2020 5.7  4.0 - 6.0 % Final    Estimated Avg Glucose 07/06/2020 117  mg/dL Final    Comment: The ADA and AACC recommend providing the estimated average glucose result to permit better   patient understanding of their HBA1c result.       Glucose 07/06/2020 111* 70 - 99 mg/dL Final    BUN 07/06/2020 24* 8 - 23 mg/dL Final    CREATININE 07/06/2020 1.01  0.70 - 1.20 mg/dL Final    Bun/Cre Ratio 07/06/2020 NOT REPORTED  9 - 20 Final    Calcium 07/06/2020 9.3  8.6 - 10.4 mg/dL Final    Sodium 07/06/2020 141  135 - 144 mmol/L Final    Potassium 07/06/2020 3.9  3.7 - 5.3 mmol/L Final    Chloride 07/06/2020 105  98 - 107 mmol/L Final  CO2 07/06/2020 25  20 - 31 mmol/L Final    Anion Gap 07/06/2020 11  9 - 17 mmol/L Final    GFR Non- 07/06/2020 >60  >60 mL/min Final    GFR  07/06/2020 >60  >60 mL/min Final    GFR Comment 07/06/2020        Final    Comment: Average GFR for 61-76 years old:   80 mL/min/1.73sq m  Chronic Kidney Disease:   <60 mL/min/1.73sq m  Kidney failure:   <15 mL/min/1.73sq m              eGFR calculated using average adult body mass.  Additional eGFR calculator available at:        [email or web address]            GFR Staging 07/06/2020 NOT REPORTED   Final         Most recent labs reviewed:     Lab Results   Component Value Date    WBC 5.24 09/17/2019    HGB 14.0 09/17/2019    HCT 41.3 09/17/2019    MCV 90.0 09/17/2019     09/17/2019       @BRIEFLAB(NA,K,CL,CO2,BUN,CREATININE,GLUCOSE,CALCIUM)@     Lab Results   Component Value Date    ALT 24 07/06/2020    AST 24 07/06/2020    ALKPHOS 43 07/06/2020    BILITOT 0.73 07/06/2020       No results found for: TSHFT4, TSH    Lab Results   Component Value Date    CHOL 137 07/06/2020    CHOL 135 05/29/2019    CHOL 168 03/22/2018     Lab Results   Component Value Date    TRIG 134 07/06/2020    TRIG 115 05/29/2019    TRIG 234 (H) 03/22/2018     Lab Results   Component Value Date    HDL 34 (L) 07/06/2020    HDL 35 (L) 05/29/2019    HDL 38 (L) 03/22/2018     Lab Results   Component Value Date    LDLCHOLESTEROL 76 07/06/2020    LDLCHOLESTEROL 77 05/29/2019    LDLCHOLESTEROL 83 03/22/2018     Lab Results   Component Value Date    VLDL NOT REPORTED 07/06/2020    VLDL NOT REPORTED 05/29/2019    VLDL NOT REPORTED 03/22/2018     Lab Results   Component Value Date    CHOLHDLRATIO 4.0 07/06/2020    CHOLHDLRATIO 3.9 05/29/2019    CHOLHDLRATIO 4.4 03/22/2018       Lab Results   Component Value Date    LABA1C 5.3 01/05/2021       Lab Results   Component Value Date    CTFENULU64 387 05/30/2019       Lab Results Component Value Date    FOLATE >20.0 05/30/2019       Lab Results   Component Value Date    IRON 68 05/30/2019    TIBC 276 05/30/2019    FERRITIN 280 05/30/2019       Lab Results   Component Value Date    VITD25 26.0 (L) 03/22/2018             Current Outpatient Medications   Medication Sig Dispense Refill    sitaGLIPtan-metFORMIN (JANUMET)  MG per tablet TAKE 1 TABLET DAILY  WITH MEALS 90 tablet 1    triamterene-hydroCHLOROthiazide (DYAZIDE) 37.5-25 MG per capsule TAKE 1 CAPSULE DAILY 90 capsule 1    atenolol (TENORMIN) 50 MG tablet TAKE 1 AND 1/2 TABLETS     DAILY 135 tablet 3    losartan (COZAAR) 100 MG tablet TAKE 1 TABLET DAILY 90 tablet 3    simvastatin (ZOCOR) 10 MG tablet TAKE 1 TABLET NIGHTLY 90 tablet 1    amLODIPine (NORVASC) 5 MG tablet TAKE 1 TABLET DAILY 90 tablet 3    zoster recombinant adjuvanted vaccine (SHINGRIX) 50 MCG/0.5ML SUSR injection 50 MCG IM then repeat 2-6 months. 0.5 mL 1    ONE TOUCH ULTRASOFT LANCETS MISC 1 each by Does not apply route daily 100 each 3    blood glucose monitor strips Test blood sugar once daily 100 strip 3    potassium chloride (MICRO-K) 10 MEQ extended release capsule TAKE 1 CAPSULE TWICE DAILY 180 capsule 3    ONETOUCH DELICA LANCETS FINE MISC DX:E11.65 PATIENT TO TEST 2-3 TIMES DAILY  11    vitamin D (CHOLECALCIFEROL) 1000 UNIT TABS tablet Take 1,000 Units by mouth daily      fluticasone (FLONASE) 50 MCG/ACT nasal spray 1 spray by Nasal route daily      loratadine (CLARITIN) 10 MG tablet Take 10 mg by mouth daily as needed       folic acid (FOLVITE) 1 MG tablet Take 1 mg by mouth daily      methotrexate (RHEUMATREX) 2.5 MG chemo tablet Take 2.5 mg by mouth once a week 10 tabs weekly      inFLIXimab (REMICADE) 100 MG injection Infuse 5 mg/kg intravenously See Admin Instructions Every 8 weeks       No current facility-administered medications for this visit.               Social History     Socioeconomic History    Marital status:  Spouse name: Not on file    Number of children: Not on file    Years of education: Not on file    Highest education level: Not on file   Occupational History    Not on file   Social Needs    Financial resource strain: Not on file    Food insecurity     Worry: Not on file     Inability: Not on file    Transportation needs     Medical: Not on file     Non-medical: Not on file   Tobacco Use    Smoking status: Never Smoker    Smokeless tobacco: Never Used   Substance and Sexual Activity    Alcohol use: No     Alcohol/week: 0.0 standard drinks    Drug use: No    Sexual activity: Yes     Partners: Female   Lifestyle    Physical activity     Days per week: Not on file     Minutes per session: Not on file    Stress: Not on file   Relationships    Social connections     Talks on phone: Not on file     Gets together: Not on file     Attends Gnosticism service: Not on file     Active member of club or organization: Not on file     Attends meetings of clubs or organizations: Not on file     Relationship status: Not on file    Intimate partner violence     Fear of current or ex partner: Not on file     Emotionally abused: Not on file     Physically abused: Not on file     Forced sexual activity: Not on file   Other Topics Concern    Not on file   Social History Narrative    Not on file     Counseling given: Not Answered        Family History   Problem Relation Age of Onset    Other Mother         mild dysplasia    Cancer Father         throat, lung             -rest of complaints with corresponding details per ROS    The patient's past medical, surgical, social, and family history as well as his current medications and allergies were reviewed as documented intoday's encounter. Review of Systems   Constitutional: Negative for activity change, appetite change, fatigue and fever. HENT: Negative for congestion, nosebleeds, postnasal drip and rhinorrhea. Eyes: Negative for photophobia and visual disturbance. Respiratory: Negative for cough, chest tightness, shortness of breath and wheezing. Cardiovascular: Negative for chest pain, palpitations and leg swelling. Gastrointestinal: Negative for abdominal distention, abdominal pain, blood in stool, constipation, diarrhea, nausea and vomiting. Endocrine: Negative for polyuria. Genitourinary: Negative for difficulty urinating, frequency and urgency. Musculoskeletal: Positive for arthralgias, back pain, gait problem and joint swelling. Negative for myalgias. Neurological: Positive for weakness and numbness. Negative for dizziness, speech difficulty, light-headedness and headaches. Psychiatric/Behavioral: Negative for agitation, behavioral problems, decreased concentration, hallucinations and suicidal ideas. The patient is not nervous/anxious. Physical Exam  Vitals signs and nursing note reviewed. Constitutional:       Appearance: Normal appearance. HENT:      Head: Normocephalic and atraumatic. Right Ear: Tympanic membrane normal.      Left Ear: Tympanic membrane normal.      Nose: Nose normal.      Mouth/Throat:      Mouth: Mucous membranes are moist.   Neck:      Musculoskeletal: Normal range of motion. Cardiovascular:      Rate and Rhythm: Normal rate and regular rhythm. Heart sounds: Normal heart sounds. Pulmonary:      Effort: Pulmonary effort is normal.      Breath sounds: Normal breath sounds. No wheezing or rhonchi. Abdominal:      General: Bowel sounds are normal. There is no distension. Palpations: Abdomen is soft. Tenderness: There is no abdominal tenderness. Musculoskeletal:      Right wrist: He exhibits decreased range of motion and tenderness. Lumbar back: He exhibits decreased range of motion. Right hand: He exhibits tenderness. He exhibits normal range of motion and no bony tenderness.    Lymphadenopathy: Cervical: No cervical adenopathy. Right cervical: No superficial cervical adenopathy. Skin:     General: Skin is warm. Neurological:      Mental Status: He is alert and oriented to person, place, and time. Cranial Nerves: Cranial nerves are intact. Sensory: Sensation is intact. Motor: Motor function is intact. Coordination: Coordination is intact. Gait: Gait is intact. Gait normal.   Psychiatric:         Attention and Perception: Attention and perception normal.         Mood and Affect: Mood and affect normal.         Speech: Speech normal.         Behavior: Behavior normal.             ASSESSMENT AND PLAN      1. Type 2 diabetes mellitus without complication, without long-term current use of insulin (HCC)  A1c has decreased to 5.3, decrease Janumet to once daily monitor your blood sugars. We will get eye exams  - POCT glycosylated hemoglobin (Hb A1C)  - Microalbumin / Creatinine Urine Ratio; Future  - Urinalysis With Microscopic; Future  - sitaGLIPtan-metFORMIN (JANUMET)  MG per tablet; TAKE 1 TABLET DAILY  WITH MEALS  Dispense: 90 tablet; Refill: 1    2. Essential hypertension  Controlled continue same medications    3. Mixed hyperlipidemia  Stable on statins. Continue rest    4. Rheumatoid arthritis involving multiple joints (HCC)  Stable continue same medications follow-up with rheumatologist    5. Vitamin D deficiency    - Vitamin D 25 Hydroxy; Future    6. Screening for prostate cancer    - PSA Screening; Future      Orders Placed This Encounter   Procedures    Microalbumin / Creatinine Urine Ratio     Standing Status:   Future     Standing Expiration Date:   1/5/2022    Urinalysis With Microscopic     Standing Status:   Future     Standing Expiration Date:   1/5/2022     Order Specific Question:   SPECIFY(EX-CATH,MIDSTREAM,CYSTO,ETC)?      Answer:   midstream    PSA Screening     Standing Status:   Future     Standing Expiration Date:   1/5/2022  Vitamin D 25 Hydroxy     Standing Status:   Future     Standing Expiration Date:   1/5/2022    POCT glycosylated hemoglobin (Hb A1C)         Medications Discontinued During This Encounter   Medication Reason    JANUMET  MG per tablet Juan Sabillon received counseling on the following healthy behaviors: nutrition, exercise and medication adherence  Reviewed prior labs and health maintenance  Continue current medications, diet and exercise. Discussed use, benefit, and side effects of prescribed medications. Barriers to medication compliance addressed. Patient given educational materials - see patient instructions  Was a self-tracking handout given in paper form or via [employer]? Yes    Requested Prescriptions     Signed Prescriptions Disp Refills    sitaGLIPtan-metFORMIN (JANUMET)  MG per tablet 90 tablet 1     Sig: TAKE 1 TABLET DAILY  WITH MEALS       All patient questions answered. Patient voiced understanding. Quality Measures    Body mass index is 25.28 kg/m². Elevated. Weight control planned discussed daily exercise regimen and Healthy diet and regular exercise. BP: 128/77. Blood pressure is normal. Treatment plan consists of Weight Reduction, DASH Eating Plan and No treatment change needed. Fall Risk 7/2/2020 2/14/2019 4/5/2018 4/5/2017   2 or more falls in past year? no no no no   Fall with injury in past year? no no no no     The patient does not have a history of falls. I did , complete a risk assessment for falls.  A plan of care for falls in-office gait and balance testing performed using The Timed Up and Go Test was negative for increased falls risk- no further intervention is currently indicated, No Treatment plan indicated    Lab Results   Component Value Date    LDLCHOLESTEROL 76 07/06/2020    (goal LDL reduction with dx if diabetes is 50% LDL reduction)    PHQ Scores 1/5/2021 7/2/2020 2/14/2019 11/14/2018 4/5/2018 4/5/2017   PHQ2 Score 0 0 0 - 0 0 PHQ9 Score 0 0 0 0 0 0     Interpretation of Total Score Depression Severity: 1-4 = Minimal depression, 5-9 = Mild depression, 10-14 = Moderate depression, 15-19 = Moderately severe depression, 20-27 = Severe depression      The patient'spast medical, surgical, social, and family history as well as his   current medications and allergies were reviewed as documented in today's encounter. Medications, labs, diagnostic studies, consultations andfollow-up as documented in this encounter. Return in about 6 months (around 7/5/2021) for dm ,htn, hld, A1C. Patient wasseen with total face to face time of 30 minutes. More than 50% of this visit was counseling and education. Future Appointments   Date Time Provider Papito Simms   7/6/2021 12:00 PM [facility]     This note was completed by using the assistance of a speech-recognition program. However, inadvertent computerized transcription errors may be present. Althoughevery effort was made to ensure accuracy, no guarantees can be provided that every mistake has been identified and corrected by editing.   Electronically signed by Randy Bourgeois MD on 1/6/2021  7:29 AM

## 2021-01-05 NOTE — PROGRESS NOTES
Visit Information    Have you changed or started any medications since your last visit including any over-the-counter medicines, vitamins, or herbal medicines? no   Have you stopped taking any of your medications? Is so, why? -  no  Are you having any side effects from any of your medications? - no    Have you seen any other physician or provider since your last visit?  no   Have you had any other diagnostic tests since your last visit?  no   Have you been seen in the emergency room and/or had an admission in a hospital since we last saw you?  no   Have you had your routine dental cleaning in the past 6 months?  no     Do you have an active MyChart account? If no, what is the barrier?   Yes    Patient Care Team:  Catalina Benoit MD as PCP - General (Family Medicine)  Catalina Benoit MD as PCP - [facility]  Godwin Anderson MD as Consulting Physician (Internal Medicine)  Mamta Pineda MD as Consulting Physician (Gastroenterology)    Medical History Review  Past Medical, Family, and Social History reviewed and does contribute to the patient presenting condition    Health Maintenance   Topic Date Due    Diabetic retinal exam  07/27/1962    DTaP/Tdap/Td vaccine (1 - Tdap) 07/27/1971    Shingles Vaccine (1 of 2) 07/27/2002    Diabetic foot exam  11/14/2019    Diabetic microalbuminuria test  02/25/2021    Annual Wellness Visit (AWV)  07/03/2021    A1C test (Diabetic or Prediabetic)  07/06/2021    Lipid screen  07/06/2021    Potassium monitoring  07/06/2021    Creatinine monitoring  07/06/2021    Colon cancer screen colonoscopy  12/14/2021    Flu vaccine  Completed    Pneumococcal 65+ years Vaccine  Completed    Hepatitis C screen  Completed    Hepatitis A vaccine  Aged Out    Hib vaccine  Aged Out    Meningococcal (ACWY) vaccine  Aged Out

## 2021-01-06 PROBLEM — E11.65 TYPE 2 DIABETES MELLITUS WITH HYPERGLYCEMIA, WITHOUT LONG-TERM CURRENT USE OF INSULIN (HCC): Status: ACTIVE | Noted: 2018-08-14

## 2021-01-06 PROBLEM — E11.65 TYPE 2 DIABETES MELLITUS WITH HYPERGLYCEMIA, WITHOUT LONG-TERM CURRENT USE OF INSULIN (HCC): Status: RESOLVED | Noted: 2018-08-14 | Resolved: 2021-01-06

## 2021-01-06 ASSESSMENT — ENCOUNTER SYMPTOMS
BACK PAIN: 1
BLOOD IN STOOL: 0
CONSTIPATION: 0
VOMITING: 0
CHEST TIGHTNESS: 0
DIARRHEA: 0
ABDOMINAL DISTENTION: 0
WHEEZING: 0
RHINORRHEA: 0
PHOTOPHOBIA: 0
COUGH: 0
ABDOMINAL PAIN: 0
NAUSEA: 0
SHORTNESS OF BREATH: 0

## 2021-02-11 DIAGNOSIS — I10 ESSENTIAL HYPERTENSION: ICD-10-CM

## 2021-02-12 RX ORDER — ATENOLOL 50 MG/1
TABLET ORAL
Qty: 135 TABLET | Refills: 3 | Status: SHIPPED | OUTPATIENT
Start: 2021-02-12 | End: 2022-03-16

## 2021-03-07 DIAGNOSIS — E87.6 HYPOKALEMIA: ICD-10-CM

## 2021-03-07 DIAGNOSIS — I10 ESSENTIAL HYPERTENSION: ICD-10-CM

## 2021-03-07 DIAGNOSIS — E78.2 MIXED HYPERLIPIDEMIA: ICD-10-CM

## 2021-03-08 RX ORDER — SIMVASTATIN 10 MG
TABLET ORAL
Qty: 90 TABLET | Refills: 1 | Status: SHIPPED | OUTPATIENT
Start: 2021-03-08 | End: 2021-08-31

## 2021-03-08 RX ORDER — AMLODIPINE BESYLATE 5 MG/1
TABLET ORAL
Qty: 90 TABLET | Refills: 3 | Status: SHIPPED | OUTPATIENT
Start: 2021-03-08 | End: 2022-03-16

## 2021-03-08 RX ORDER — LOSARTAN POTASSIUM 100 MG/1
TABLET ORAL
Qty: 90 TABLET | Refills: 3 | Status: SHIPPED | OUTPATIENT
Start: 2021-03-08 | End: 2022-03-16

## 2021-03-08 RX ORDER — POTASSIUM CHLORIDE 750 MG/1
CAPSULE, EXTENDED RELEASE ORAL
Qty: 180 CAPSULE | Refills: 3 | Status: SHIPPED | OUTPATIENT
Start: 2021-03-08 | End: 2022-03-16

## 2021-06-20 DIAGNOSIS — I10 ESSENTIAL HYPERTENSION: ICD-10-CM

## 2021-06-21 RX ORDER — TRIAMTERENE AND HYDROCHLOROTHIAZIDE 37.5; 25 MG/1; MG/1
1 CAPSULE ORAL DAILY
Qty: 90 CAPSULE | Refills: 2 | Status: SHIPPED | OUTPATIENT
Start: 2021-06-21 | End: 2022-03-16

## 2021-06-29 ENCOUNTER — HOSPITAL ENCOUNTER (OUTPATIENT)
Age: 71
Setting detail: SPECIMEN
Discharge: HOME | End: 2021-06-29
Payer: MEDICARE

## 2021-06-29 DIAGNOSIS — E55.9 VITAMIN D DEFICIENCY: ICD-10-CM

## 2021-06-29 DIAGNOSIS — E11.9 TYPE 2 DIABETES MELLITUS WITHOUT COMPLICATION, WITHOUT LONG-TERM CURRENT USE OF INSULIN (HCC): ICD-10-CM

## 2021-06-29 DIAGNOSIS — Z12.5 SCREENING FOR PROSTATE CANCER: ICD-10-CM

## 2021-06-29 LAB
-: NORMAL
AMORPHOUS: NORMAL
BACTERIA: NORMAL
BILIRUBIN URINE: NEGATIVE
CASTS UA: NORMAL /LPF (ref 0–8)
COLOR: YELLOW
CREATININE URINE: 96.9 MG/DL (ref 39–259)
CRYSTALS, UA: NORMAL /HPF
EPITHELIAL CELLS UA: NORMAL /HPF (ref 0–5)
GLUCOSE URINE: NEGATIVE
KETONES, URINE: NEGATIVE
LEUKOCYTE ESTERASE, URINE: NEGATIVE
MICROALBUMIN/CREAT 24H UR: <12 MG/L
MICROALBUMIN/CREAT UR-RTO: NORMAL MCG/MG CREAT
MUCUS: NORMAL
NITRITE, URINE: NEGATIVE
OTHER OBSERVATIONS UA: NORMAL
PH UA: 6 (ref 5–8)
PROTEIN UA: NEGATIVE
RBC UA: NORMAL /HPF (ref 0–4)
RENAL EPITHELIAL, UA: NORMAL /HPF
SPECIFIC GRAVITY UA: 1.02 (ref 1–1.03)
TRICHOMONAS: NORMAL
TURBIDITY: CLEAR
URINE HGB: NEGATIVE
UROBILINOGEN, URINE: NORMAL
WBC UA: NORMAL /HPF (ref 0–5)
YEAST: NORMAL

## 2021-06-30 LAB
PROSTATE SPECIFIC ANTIGEN: 0.89 UG/L
VITAMIN D 25-HYDROXY: 51.1 NG/ML (ref 30–100)

## 2021-07-06 ENCOUNTER — OFFICE VISIT (OUTPATIENT)
Dept: FAMILY MEDICINE CLINIC | Age: 71
End: 2021-07-06
Payer: MEDICARE

## 2021-07-06 VITALS
TEMPERATURE: 96.8 F | HEIGHT: 67 IN | SYSTOLIC BLOOD PRESSURE: 120 MMHG | OXYGEN SATURATION: 99 % | HEART RATE: 64 BPM | DIASTOLIC BLOOD PRESSURE: 70 MMHG | BODY MASS INDEX: 28.88 KG/M2 | WEIGHT: 184 LBS

## 2021-07-06 DIAGNOSIS — E78.2 MIXED HYPERLIPIDEMIA: ICD-10-CM

## 2021-07-06 DIAGNOSIS — I10 ESSENTIAL HYPERTENSION: ICD-10-CM

## 2021-07-06 DIAGNOSIS — E11.9 TYPE 2 DIABETES MELLITUS WITHOUT COMPLICATION, WITHOUT LONG-TERM CURRENT USE OF INSULIN (HCC): Primary | ICD-10-CM

## 2021-07-06 DIAGNOSIS — E55.9 VITAMIN D DEFICIENCY: ICD-10-CM

## 2021-07-06 LAB — HBA1C MFR BLD: 6 %

## 2021-07-06 PROCEDURE — 3044F HG A1C LEVEL LT 7.0%: CPT | Performed by: FAMILY MEDICINE

## 2021-07-06 PROCEDURE — 99214 OFFICE O/P EST MOD 30 MIN: CPT | Performed by: FAMILY MEDICINE

## 2021-07-06 PROCEDURE — 2022F DILAT RTA XM EVC RTNOPTHY: CPT | Performed by: FAMILY MEDICINE

## 2021-07-06 PROCEDURE — 1036F TOBACCO NON-USER: CPT | Performed by: FAMILY MEDICINE

## 2021-07-06 PROCEDURE — 4040F PNEUMOC VAC/ADMIN/RCVD: CPT | Performed by: FAMILY MEDICINE

## 2021-07-06 PROCEDURE — 1123F ACP DISCUSS/DSCN MKR DOCD: CPT | Performed by: FAMILY MEDICINE

## 2021-07-06 PROCEDURE — 3017F COLORECTAL CA SCREEN DOC REV: CPT | Performed by: FAMILY MEDICINE

## 2021-07-06 PROCEDURE — 83036 HEMOGLOBIN GLYCOSYLATED A1C: CPT | Performed by: FAMILY MEDICINE

## 2021-07-06 PROCEDURE — G8417 CALC BMI ABV UP PARAM F/U: HCPCS | Performed by: FAMILY MEDICINE

## 2021-07-06 PROCEDURE — G8427 DOCREV CUR MEDS BY ELIG CLIN: HCPCS | Performed by: FAMILY MEDICINE

## 2021-07-06 RX ORDER — ZOSTER VACCINE RECOMBINANT, ADJUVANTED 50 MCG/0.5
0.5 KIT INTRAMUSCULAR SEE ADMIN INSTRUCTIONS
Qty: 0.5 ML | Refills: 0 | Status: CANCELLED | OUTPATIENT
Start: 2021-07-06 | End: 2021-07-07

## 2021-07-06 SDOH — ECONOMIC STABILITY: FOOD INSECURITY: WITHIN THE PAST 12 MONTHS, YOU WORRIED THAT YOUR FOOD WOULD RUN OUT BEFORE YOU GOT MONEY TO BUY MORE.: NEVER TRUE

## 2021-07-06 SDOH — ECONOMIC STABILITY: FOOD INSECURITY: WITHIN THE PAST 12 MONTHS, THE FOOD YOU BOUGHT JUST DIDN'T LAST AND YOU DIDN'T HAVE MONEY TO GET MORE.: NEVER TRUE

## 2021-07-06 ASSESSMENT — PATIENT HEALTH QUESTIONNAIRE - PHQ9
SUM OF ALL RESPONSES TO PHQ QUESTIONS 1-9: 0
2. FEELING DOWN, DEPRESSED OR HOPELESS: 0
1. LITTLE INTEREST OR PLEASURE IN DOING THINGS: 0
SUM OF ALL RESPONSES TO PHQ QUESTIONS 1-9: 0
SUM OF ALL RESPONSES TO PHQ9 QUESTIONS 1 & 2: 0
SUM OF ALL RESPONSES TO PHQ QUESTIONS 1-9: 0

## 2021-07-06 ASSESSMENT — ENCOUNTER SYMPTOMS
ABDOMINAL DISTENTION: 0
DIARRHEA: 0
SINUS PAIN: 0
COLOR CHANGE: 0
SHORTNESS OF BREATH: 0
NAUSEA: 0
ABDOMINAL PAIN: 0
BLOOD IN STOOL: 0
VOMITING: 0
PHOTOPHOBIA: 0
COUGH: 0
BACK PAIN: 0
SINUS PRESSURE: 0
WHEEZING: 0
EYE REDNESS: 0

## 2021-07-06 ASSESSMENT — SOCIAL DETERMINANTS OF HEALTH (SDOH): HOW HARD IS IT FOR YOU TO PAY FOR THE VERY BASICS LIKE FOOD, HOUSING, MEDICAL CARE, AND HEATING?: NOT VERY HARD

## 2021-07-06 NOTE — PROGRESS NOTES
Chief Complaint   Patient presents with    Diabetes    Hyperlipidemia    Hypertension    Health Maintenance     Pt declined tetanus and shingles vaccines. Masha Luis  here today for follow up on chronic medical problems, go over labs and/or diagnostic studies, and medication refills. Diabetes, Hyperlipidemia, Hypertension, and Health Maintenance (Pt declined tetanus and shingles vaccines.)      HPI: Patient is here 6-month follow-up for diabetes and hyperlipidemia. Discussed blood work      Diabetes A1c has mildly increased to 6 from 5.3. Patient is on Janumet and was decreased to 1 tablet a day. Patient does not check his blood sugars daily. He is due for eye exam in July. Hyperlipidemia on statins needs repeat blood work    Hypertension controlled denies any chest pain shortness of breath palpitations leg swelling. Vitamin D deficiency, normal recent levels. /70   Pulse 64   Temp 96.8 °F (36 °C)   Ht 5' 7\" (1.702 m)   Wt 184 lb (83.5 kg)   SpO2 99%   BMI 28.82 kg/m²    Body mass index is 28.82 kg/m². Wt Readings from Last 3 Encounters:   07/06/21 184 lb (83.5 kg)   01/05/21 161 lb 6.4 oz (73.2 kg)   07/02/20 174 lb 3.2 oz (79 kg)        [x]Negative depression screening. PHQ Scores 7/6/2021 1/5/2021 7/2/2020 2/14/2019 11/14/2018 4/5/2018 4/5/2017   PHQ2 Score 0 0 0 0 - 0 0   PHQ9 Score 0 0 0 0 0 0 0      []1-4 = Minimal depression   []5-9 = Milddepression   []10-14 = Moderate depression   []15-19 = Moderately severe depression   []20-27 = Severe depression    Discussed testing with the patient and all questions fully answered. Hospital Outpatient Visit on 06/29/2021   Component Date Value Ref Range Status    PSA 06/29/2021 0.89  <4.1 ug/L Final    Comment: The Roche \"ECLIA\" assay is used. Results obtained with different assay methods cannot be   used interchangeably.       Vit D, 25-Hydroxy 06/29/2021 51.1  30.0 - 100.0 ng/mL Final    Comment:    Reference Range:  Vitamin D status         Range   Deficiency              <20 ng/mL   Mild Deficiency       20-30 ng/mL   Sufficiency           ng/mL   Toxicity               >100 ng/mL      Color, UA 06/29/2021 YELLOW  YELLOW Final    Turbidity UA 06/29/2021 CLEAR  CLEAR Final    Glucose, Ur 06/29/2021 NEGATIVE  NEGATIVE Final    Bilirubin Urine 06/29/2021 NEGATIVE  NEGATIVE Final    Ketones, Urine 06/29/2021 NEGATIVE  NEGATIVE Final    Specific [place], UA 06/29/2021 1.017  1.005 - 1.030 Final    Urine Hgb 06/29/2021 NEGATIVE  NEGATIVE Final    pH, UA 06/29/2021 6.0  5.0 - 8.0 Final    Protein, UA 06/29/2021 NEGATIVE  NEGATIVE Final    Urobilinogen, Urine 06/29/2021 Normal  Normal Final    Nitrite, Urine 06/29/2021 NEGATIVE  NEGATIVE Final    Leukocyte Esterase, Urine 06/29/2021 NEGATIVE  NEGATIVE Final    - 06/29/2021        Final    WBC, UA 06/29/2021 None  0 - 5 /HPF Final    RBC, UA 06/29/2021 None  0 - 4 /HPF Final    Reference range defined for non-centrifuged specimen.  Casts UA 06/29/2021 NOT REPORTED  0 - 8 /LPF Final    Crystals, UA 06/29/2021 NOT REPORTED  None /HPF Final    Epithelial Cells UA 06/29/2021 None  0 - 5 /HPF Final    Renal Epithelial, UA 06/29/2021 NOT REPORTED  0 /HPF Final    Bacteria, UA 06/29/2021 NOT REPORTED  None Final    Mucus, UA 06/29/2021 NOT REPORTED  None Final    Trichomonas, UA 06/29/2021 NOT REPORTED  None Final    Amorphous, UA 06/29/2021 NOT REPORTED  None Final    Other Observations UA 06/29/2021 NOT REPORTED  NOT REQ. Final    Yeast, UA 06/29/2021 NOT REPORTED  None Final    Microalb, Ur 06/29/2021 <12  <21 mg/L Final    Creatinine, Ur 06/29/2021 96.9  39.0 - 259.0 mg/dL Final    Microalb/Crt.  Ratio 06/29/2021 CANNOT BE CALCULATED  <17 mcg/mg creat Final         Most recent labs reviewed:     Lab Results   Component Value Date    WBC 5.24 09/17/2019    HGB 14.0 09/17/2019    HCT 41.3 09/17/2019    MCV 90.0 09/17/2019     09/17/2019 @BRIEFLAB(NA,K,CL,CO2,BUN,CREATININE,GLUCOSE,CALCIUM)@     Lab Results   Component Value Date    ALT 24 07/06/2020    AST 24 07/06/2020    ALKPHOS 43 07/06/2020    BILITOT 0.73 07/06/2020       No results found for: TSHFT4, TSH    Lab Results   Component Value Date    CHOL 137 07/06/2020    CHOL 135 05/29/2019    CHOL 168 03/22/2018     Lab Results   Component Value Date    TRIG 134 07/06/2020    TRIG 115 05/29/2019    TRIG 234 (H) 03/22/2018     Lab Results   Component Value Date    HDL 34 (L) 07/06/2020    HDL 35 (L) 05/29/2019    HDL 38 (L) 03/22/2018     Lab Results   Component Value Date    LDLCHOLESTEROL 76 07/06/2020    LDLCHOLESTEROL 77 05/29/2019    LDLCHOLESTEROL 83 03/22/2018     Lab Results   Component Value Date    VLDL NOT REPORTED 07/06/2020    VLDL NOT REPORTED 05/29/2019    VLDL NOT REPORTED 03/22/2018     Lab Results   Component Value Date    CHOLHDLRATIO 4.0 07/06/2020    CHOLHDLRATIO 3.9 05/29/2019    CHOLHDLRATIO 4.4 03/22/2018       Lab Results   Component Value Date    LABA1C 5.3 01/05/2021       Lab Results   Component Value Date    TVVLOXGX93 387 05/30/2019       Lab Results   Component Value Date    FOLATE >20.0 05/30/2019       Lab Results   Component Value Date    IRON 68 05/30/2019    TIBC 276 05/30/2019    FERRITIN 280 05/30/2019       Lab Results   Component Value Date    VITD25 51.1 06/29/2021             Current Outpatient Medications   Medication Sig Dispense Refill    triamterene-hydroCHLOROthiazide (DYAZIDE) 37.5-25 MG per capsule Take 1 capsule by mouth daily TAKE 1 CAPSULE DAILY 90 capsule 2    potassium chloride (MICRO-K) 10 MEQ extended release capsule TAKE 1 CAPSULE TWICE DAILY 180 capsule 3    losartan (COZAAR) 100 MG tablet TAKE 1 TABLET DAILY 90 tablet 3    simvastatin (ZOCOR) 10 MG tablet TAKE 1 TABLET NIGHTLY 90 tablet 1    amLODIPine (NORVASC) 5 MG tablet TAKE 1 TABLET DAILY 90 tablet 3    atenolol (TENORMIN) 50 MG tablet TAKE 1 AND 1/2 TABLETS     DAILY 135 Physical Activity:     Days of Exercise per Week:     Minutes of Exercise per Session:    Stress:     Feeling of Stress :    Social Connections:     Frequency of Communication with Friends and Family:     Frequency of Social Gatherings with Friends and Family:     Attends L[employer] day Services:     Active Member of Clubs or Organizations:     Attends Club or Organization Meetings:     Marital Status:    Intimate Partner Violence:     Fear of Current or Ex-Partner:     Emotionally Abused:     Physically Abused:     Sexually Abused:      Counseling given: Yes        Family History   Problem Relation Age of Onset    Other Mother         mild dysplasia    Cancer Father         throat, lung             -rest of complaints with corresponding details per ROS    The patient's past medical, surgical, social, and family history as well as his current medications and allergies were reviewed as documented intoday's encounter. Review of Systems   Constitutional: Negative for activity change, appetite change, fatigue, fever and unexpected weight change. HENT: Negative for congestion, nosebleeds, postnasal drip, sinus pressure and sinus pain. Eyes: Positive for visual disturbance. Negative for photophobia and redness. Respiratory: Negative for cough, shortness of breath and wheezing. Cardiovascular: Negative for chest pain, palpitations and leg swelling. Gastrointestinal: Negative for abdominal distention, abdominal pain, blood in stool, diarrhea, nausea and vomiting. Endocrine: Negative for polyuria. Genitourinary: Negative for decreased urine volume, difficulty urinating, flank pain, frequency, hematuria and urgency. Musculoskeletal: Positive for arthralgias. Negative for back pain, gait problem, myalgias and neck stiffness. Skin: Negative for color change. Neurological: Negative for dizziness, speech difficulty, weakness, light-headedness, numbness and headaches. Psychiatric/Behavioral: Negative for agitation, decreased concentration, sleep disturbance and suicidal ideas. The patient is not nervous/anxious and is not hyperactive. Physical Exam    PHYSICAL EXAM:   VITALS:   Vitals:    07/06/21 1201   BP: 120/70   Pulse: 64   Temp: 96.8 °F (36 °C)   SpO2: 99%     GENERAL:  Patient is a well-developed, well-nourished male  in no acute distress, alert and oriented x3, appropriate and pleasant conversation. HEAD: Normocephalic, atraumatic. EYES: Pupils equal, round and reactive to light and accommodation, extraocular   movements intact. ENT: Moist mucous membranes. No erythema is noted. NECK: Supple. No masses. No lymphadenopathy. CARDIOVASCULAR: Regular rate and rhythm. PULMONARY: Lungs are clear to auscultation bilaterally. ABDOMEN: Soft, nontender, nondistended. Positive bowel sounds. MUSCULOSKELETAL: Strength 5/5 bilaterally in all extremities. No tenderness to   palpation of the ribs, long bones, or spine. NEUROLOGIC: Cranial nerves II through XII grossly intact. No focal deficits are noted. ASSESSMENT AND PLAN      1. Type 2 diabetes mellitus without complication, without long-term current use of insulin (HCC)  Mild increase in A1c, continue same medications monitor blood sugars more often  - POCT glycosylated hemoglobin (Hb A1C)  - TSH without Reflex; Future    2. Mixed hyperlipidemia  Continue statins repeat lipid panel  - Lipid Panel; Future  - TSH without Reflex; Future    3. Essential hypertension  Controlled continue same medications  - Comprehensive Metabolic Panel; Future  - CBC Auto Differential; Future    4.  Vitamin D deficiency  Vitamin D levels are normal continue to monitor      Orders Placed This Encounter   Procedures    Lipid Panel     Standing Status:   Future     Standing Expiration Date:   7/6/2022     Order Specific Question:   Is Patient Fasting?/# of Hours     Answer:   yes, 8-10 hours    Comprehensive Metabolic Panel     Fasting 8 hrs     Standing Status:   Future     Standing Expiration Date:   7/6/2022    CBC Auto Differential     Standing Status:   Future     Standing Expiration Date:   7/7/2022    TSH without Reflex     Standing Status:   Future     Standing Expiration Date:   7/6/2022    POCT glycosylated hemoglobin (Hb A1C)         There are no discontinued medications. Ofilia Lights received counseling on the following healthy behaviors: nutrition, exercise and medication adherence  Reviewed prior labs and health maintenance  Continue current medications, diet and exercise. Discussed use, benefit, and side effects of prescribed medications. Barriers to medication compliance addressed. Patient given educational materials - see patient instructions  Was a self-tracking handout given in paper form or via [employer]? Yes    Requested Prescriptions      No prescriptions requested or ordered in this encounter       All patient questions answered. Patient voiced understanding. Quality Measures    Body mass index is 28.82 kg/m². Elevated. Weight control planned discussed daily exercise regimen and Healthy diet and regular exercise. BP: 120/70. Blood pressure is normal. Treatment plan consists of No treatment change needed. Fall Risk 7/6/2021 7/2/2020 2/14/2019 4/5/2018 4/5/2017   2 or more falls in past year? no no no no no   Fall with injury in past year? no no no no no     The patient does not have a history of falls. I did , complete a risk assessment for falls.  A plan of care for falls in-office gait and balance testing performed using The Timed Up and Go Test was negative for increased falls risk- no further intervention is currently indicated, home safety tips provided, No Treatment plan indicated    Lab Results   Component Value Date    LDLCHOLESTEROL 76 07/06/2020    (goal LDL reduction with dx if diabetes is 50% LDL reduction)    PHQ Scores 7/6/2021 1/5/2021 7/2/2020 2/14/2019 11/14/2018 4/5/2018 4/5/2017 PHQ2 Score 0 0 0 0 - 0 0   PHQ9 Score 0 0 0 0 0 0 0     Interpretation of Total Score Depression Severity: 1-4 = Minimal depression, 5-9 = Mild depression, 10-14 = Moderate depression, 15-19 = Moderately severe depression, 20-27 = Severe depression      The patient'spast medical, surgical, social, and family history as well as his   current medications and allergies were reviewed as documented in today's encounter. Medications, labs, diagnostic studies, consultations andfollow-up as documented in this encounter. Return in about 6 months (around 1/6/2022) for dm ,htn, hld, A1C. Patient wasseen with total face to face time of 30 minutes. More than 50% of this visit was counseling and education. Future Appointments   Date Time Provider Papito Demi   1/6/2022 11:30 AM Adam Wagner MD [email or web address]     This note was completed by using the assistance of a speech-recognition program. However, inadvertent computerized transcription errors may be present. Althoughevery effort was made to ensure accuracy, no guarantees can be provided that every mistake has been identified and corrected by editing.   Electronically signed by Adam Wagner MD on 7/6/2021  12:30 PM

## 2021-07-06 NOTE — PROGRESS NOTES
Visit Information    Have you changed or started any medications since your last visit including any over-the-counter medicines, vitamins, or herbal medicines? no   Are you having any side effects from any of your medications? -  no  Have you stopped taking any of your medications? Is so, why? -  no    Have you seen any other physician or provider since your last visit? Yes - Records Obtained  Have you had any other diagnostic tests since your last visit? No  Have you been seen in the emergency room and/or had an admission to a hospital since we last saw you? No  Have you had your routine dental cleaning in the past 6 months? no    Have you activated your [employer] account? If not, what are your barriers?  Yes     Patient Care Team:  Villa Leo MD as PCP - General (Family Medicine)  Villa Leo MD as PCP - [facility]  Carlos Sahu MD as Consulting Physician (Internal Medicine)  Derek Hawkins MD as Consulting Physician (Gastroenterology)    Medical History Review  Past Medical, Family, and Social History reviewed and does contribute to the patient presenting condition    Health Maintenance   Topic Date Due    Diabetic retinal exam  Never done    DTaP/Tdap/Td vaccine (1 - Tdap) Never done    Shingles Vaccine (1 of 2) Never done    Annual Wellness Visit (AWV)  Never done    Diabetic foot exam  11/14/2019    Potassium monitoring  07/06/2021    Creatinine monitoring  07/06/2021    Lipid screen  07/06/2021    Flu vaccine (1) 09/01/2021    Colon cancer screen colonoscopy  12/14/2021    A1C test (Diabetic or Prediabetic)  01/05/2022    Diabetic microalbuminuria test  06/29/2022    Pneumococcal 65+ years Vaccine  Completed    COVID-19 Vaccine  Completed    Hepatitis C screen  Completed    Hepatitis A vaccine  Aged Out    Hib vaccine  Aged Out    Meningococcal (ACWY) vaccine  Aged Out

## 2021-08-30 DIAGNOSIS — E78.2 MIXED HYPERLIPIDEMIA: ICD-10-CM

## 2021-08-31 RX ORDER — SIMVASTATIN 10 MG
TABLET ORAL
Qty: 90 TABLET | Refills: 3 | Status: SHIPPED | OUTPATIENT
Start: 2021-08-31 | End: 2022-10-12

## 2022-01-04 ENCOUNTER — HOSPITAL ENCOUNTER (OUTPATIENT)
Age: 72
Setting detail: SPECIMEN
Discharge: HOME | End: 2022-01-04

## 2022-01-04 DIAGNOSIS — I10 ESSENTIAL HYPERTENSION: ICD-10-CM

## 2022-01-04 DIAGNOSIS — E78.2 MIXED HYPERLIPIDEMIA: ICD-10-CM

## 2022-01-04 DIAGNOSIS — E11.9 TYPE 2 DIABETES MELLITUS WITHOUT COMPLICATION, WITHOUT LONG-TERM CURRENT USE OF INSULIN (HCC): ICD-10-CM

## 2022-01-04 LAB
ABSOLUTE EOS #: 0 K/UL (ref 0–0.4)
ABSOLUTE IMMATURE GRANULOCYTE: 0.52 K/UL (ref 0–0.3)
ABSOLUTE LYMPH #: 5.76 K/UL (ref 1–4.8)
ABSOLUTE MONO #: 1.44 K/UL (ref 0.1–0.8)
ALBUMIN SERPL-MCNC: 3.3 G/DL (ref 3.5–5.2)
ALBUMIN/GLOBULIN RATIO: 1.3 (ref 1–2.5)
ALP BLD-CCNC: 61 U/L (ref 40–129)
ALT SERPL-CCNC: 31 U/L (ref 5–41)
ANION GAP SERPL CALCULATED.3IONS-SCNC: 10 MMOL/L (ref 9–17)
AST SERPL-CCNC: 51 U/L
ATYPICAL LYMPHOCYTE ABSOLUTE COUNT: 0.66 K/UL
ATYPICAL LYMPHOCYTES: 5 %
BASOPHILS # BLD: 0 % (ref 0–2)
BASOPHILS ABSOLUTE: 0 K/UL (ref 0–0.2)
BILIRUB SERPL-MCNC: 0.46 MG/DL (ref 0.3–1.2)
BUN BLDV-MCNC: 37 MG/DL (ref 8–23)
BUN/CREAT BLD: ABNORMAL (ref 9–20)
CALCIUM SERPL-MCNC: 10.6 MG/DL (ref 8.6–10.4)
CHLORIDE BLD-SCNC: 102 MMOL/L (ref 98–107)
CHOLESTEROL/HDL RATIO: 6
CHOLESTEROL: 120 MG/DL
CO2: 24 MMOL/L (ref 20–31)
CREAT SERPL-MCNC: 1.35 MG/DL (ref 0.7–1.2)
DIFFERENTIAL TYPE: ABNORMAL
EOSINOPHILS RELATIVE PERCENT: 0 % (ref 1–4)
GFR AFRICAN AMERICAN: >60 ML/MIN
GFR NON-AFRICAN AMERICAN: 52 ML/MIN
GFR SERPL CREATININE-BSD FRML MDRD: ABNORMAL ML/MIN/{1.73_M2}
GFR SERPL CREATININE-BSD FRML MDRD: ABNORMAL ML/MIN/{1.73_M2}
GLUCOSE BLD-MCNC: 122 MG/DL (ref 70–99)
HCT VFR BLD CALC: 42.4 % (ref 40.7–50.3)
HDLC SERPL-MCNC: 20 MG/DL
HEMOGLOBIN: 14 G/DL (ref 13–17)
IMMATURE GRANULOCYTES: 4 %
LDL CHOLESTEROL: 68 MG/DL (ref 0–130)
LYMPHOCYTES # BLD: 44 % (ref 24–44)
MCH RBC QN AUTO: 30.4 PG (ref 25.2–33.5)
MCHC RBC AUTO-ENTMCNC: 33 G/DL (ref 28.4–34.8)
MCV RBC AUTO: 92.2 FL (ref 82.6–102.9)
MONOCYTES # BLD: 11 % (ref 1–7)
MORPHOLOGY: ABNORMAL
NRBC AUTOMATED: 0 PER 100 WBC
PDW BLD-RTO: 16.3 % (ref 11.8–14.4)
PLATELET # BLD: 178 K/UL (ref 138–453)
PLATELET ESTIMATE: ABNORMAL
PMV BLD AUTO: 12.2 FL (ref 8.1–13.5)
POTASSIUM SERPL-SCNC: 4 MMOL/L (ref 3.7–5.3)
RBC # BLD: 4.6 M/UL (ref 4.21–5.77)
RBC # BLD: ABNORMAL 10*6/UL
SEG NEUTROPHILS: 36 % (ref 36–66)
SEGMENTED NEUTROPHILS ABSOLUTE COUNT: 4.72 K/UL (ref 1.8–7.7)
SODIUM BLD-SCNC: 136 MMOL/L (ref 135–144)
TOTAL PROTEIN: 5.9 G/DL (ref 6.4–8.3)
TRIGL SERPL-MCNC: 160 MG/DL
VLDLC SERPL CALC-MCNC: ABNORMAL MG/DL (ref 1–30)
WBC # BLD: 13.1 K/UL (ref 3.5–11.3)
WBC # BLD: ABNORMAL 10*3/UL

## 2022-01-05 DIAGNOSIS — D72.821 MONOCYTOSIS: ICD-10-CM

## 2022-01-05 DIAGNOSIS — D64.9 ANEMIA, UNSPECIFIED TYPE: ICD-10-CM

## 2022-01-05 DIAGNOSIS — R74.8 ELEVATED CREATINE KINASE: Primary | ICD-10-CM

## 2022-01-05 LAB — TSH SERPL DL<=0.05 MIU/L-ACNC: 4.18 MIU/L (ref 0.3–5)

## 2022-01-06 ENCOUNTER — HOSPITAL ENCOUNTER (OUTPATIENT)
Age: 72
Setting detail: SPECIMEN
Discharge: HOME | End: 2022-01-06

## 2022-01-06 ENCOUNTER — OFFICE VISIT (OUTPATIENT)
Dept: FAMILY MEDICINE CLINIC | Age: 72
End: 2022-01-06
Payer: MEDICARE

## 2022-01-06 VITALS
OXYGEN SATURATION: 97 % | DIASTOLIC BLOOD PRESSURE: 100 MMHG | WEIGHT: 160 LBS | TEMPERATURE: 98 F | HEIGHT: 68 IN | HEART RATE: 100 BPM | SYSTOLIC BLOOD PRESSURE: 106 MMHG | BODY MASS INDEX: 24.25 KG/M2

## 2022-01-06 DIAGNOSIS — E55.9 VITAMIN D DEFICIENCY: ICD-10-CM

## 2022-01-06 DIAGNOSIS — D64.9 ANEMIA, UNSPECIFIED TYPE: ICD-10-CM

## 2022-01-06 DIAGNOSIS — R79.89 ELEVATED SERUM CREATININE: ICD-10-CM

## 2022-01-06 DIAGNOSIS — M06.9 RHEUMATOID ARTHRITIS INVOLVING MULTIPLE JOINTS (HCC): ICD-10-CM

## 2022-01-06 DIAGNOSIS — E78.2 MIXED HYPERLIPIDEMIA: ICD-10-CM

## 2022-01-06 DIAGNOSIS — R74.8 ELEVATED CREATINE KINASE: ICD-10-CM

## 2022-01-06 DIAGNOSIS — E11.9 TYPE 2 DIABETES MELLITUS WITHOUT COMPLICATION, WITHOUT LONG-TERM CURRENT USE OF INSULIN (HCC): Primary | ICD-10-CM

## 2022-01-06 DIAGNOSIS — D72.821 MONOCYTOSIS: ICD-10-CM

## 2022-01-06 DIAGNOSIS — I10 ESSENTIAL HYPERTENSION: ICD-10-CM

## 2022-01-06 LAB
ABSOLUTE EOS #: 0.15 K/UL (ref 0–0.4)
ABSOLUTE IMMATURE GRANULOCYTE: 0.15 K/UL (ref 0–0.3)
ABSOLUTE LYMPH #: 3.7 K/UL (ref 1–4.8)
ABSOLUTE MONO #: 4.31 K/UL (ref 0.1–0.8)
ABSOLUTE RETIC #: 0.11 M/UL (ref 0.03–0.08)
ATYPICAL LYMPHOCYTE ABSOLUTE COUNT: 0.92 K/UL
ATYPICAL LYMPHOCYTES: 6 %
AVERAGE GLUCOSE: NORMAL
BASOPHILS # BLD: 0 % (ref 0–2)
BASOPHILS ABSOLUTE: 0 K/UL (ref 0–0.2)
DIFFERENTIAL TYPE: ABNORMAL
EOSINOPHILS RELATIVE PERCENT: 1 % (ref 1–4)
HBA1C MFR BLD: 6.1 %
HCT VFR BLD CALC: 42.2 % (ref 40.7–50.3)
HEMOGLOBIN: 13.8 G/DL (ref 13–17)
IMMATURE GRANULOCYTES: 1 %
IMMATURE RETIC FRACT: 13.8 % (ref 2.7–18.3)
LYMPHOCYTES # BLD: 24 % (ref 24–44)
MCH RBC QN AUTO: 30.3 PG (ref 25.2–33.5)
MCHC RBC AUTO-ENTMCNC: 32.7 G/DL (ref 28.4–34.8)
MCV RBC AUTO: 92.7 FL (ref 82.6–102.9)
MONOCYTES # BLD: 28 % (ref 1–7)
MORPHOLOGY: ABNORMAL
NRBC AUTOMATED: 0 PER 100 WBC
PDW BLD-RTO: 16.6 % (ref 11.8–14.4)
PLATELET # BLD: 157 K/UL (ref 138–453)
PLATELET ESTIMATE: ABNORMAL
PMV BLD AUTO: 12.5 FL (ref 8.1–13.5)
RBC # BLD: 4.55 M/UL (ref 4.21–5.77)
RBC # BLD: ABNORMAL 10*6/UL
RETIC %: 2.5 % (ref 0.5–1.9)
RETIC HEMOGLOBIN: 36.5 PG (ref 28.2–35.7)
SEG NEUTROPHILS: 40 % (ref 36–66)
SEGMENTED NEUTROPHILS ABSOLUTE COUNT: 6.17 K/UL (ref 1.8–7.7)
WBC # BLD: 15.4 K/UL (ref 3.5–11.3)
WBC # BLD: ABNORMAL 10*3/UL

## 2022-01-06 PROCEDURE — G8484 FLU IMMUNIZE NO ADMIN: HCPCS | Performed by: FAMILY MEDICINE

## 2022-01-06 PROCEDURE — G8420 CALC BMI NORM PARAMETERS: HCPCS | Performed by: FAMILY MEDICINE

## 2022-01-06 PROCEDURE — 99215 OFFICE O/P EST HI 40 MIN: CPT | Performed by: FAMILY MEDICINE

## 2022-01-06 PROCEDURE — 3044F HG A1C LEVEL LT 7.0%: CPT | Performed by: FAMILY MEDICINE

## 2022-01-06 PROCEDURE — 2022F DILAT RTA XM EVC RTNOPTHY: CPT | Performed by: FAMILY MEDICINE

## 2022-01-06 PROCEDURE — 1036F TOBACCO NON-USER: CPT | Performed by: FAMILY MEDICINE

## 2022-01-06 PROCEDURE — 1123F ACP DISCUSS/DSCN MKR DOCD: CPT | Performed by: FAMILY MEDICINE

## 2022-01-06 PROCEDURE — G8427 DOCREV CUR MEDS BY ELIG CLIN: HCPCS | Performed by: FAMILY MEDICINE

## 2022-01-06 PROCEDURE — 3017F COLORECTAL CA SCREEN DOC REV: CPT | Performed by: FAMILY MEDICINE

## 2022-01-06 PROCEDURE — 4040F PNEUMOC VAC/ADMIN/RCVD: CPT | Performed by: FAMILY MEDICINE

## 2022-01-06 RX ORDER — VALACYCLOVIR HYDROCHLORIDE 500 MG/1
TABLET, FILM COATED ORAL
COMMUNITY
Start: 2021-09-30 | End: 2022-05-31 | Stop reason: ALTCHOICE

## 2022-01-06 ASSESSMENT — ENCOUNTER SYMPTOMS
WHEEZING: 0
COUGH: 0
BACK PAIN: 1
BLOOD IN STOOL: 0
ABDOMINAL DISTENTION: 0
SHORTNESS OF BREATH: 0
DIARRHEA: 0
COLOR CHANGE: 0
VOMITING: 0
PHOTOPHOBIA: 0
CHEST TIGHTNESS: 0
SINUS PRESSURE: 0

## 2022-01-06 NOTE — PROGRESS NOTES
No chief complaint on file. Sergio Georgesnd  here today for follow up on chronic medical problems, go over labs and/or diagnostic studies, and medication refills. No chief complaint on file. HPI          There were no vitals taken for this visit. There is no height or weight on file to calculate BMI. Wt Readings from Last 3 Encounters:   07/06/21 184 lb (83.5 kg)   01/05/21 161 lb 6.4 oz (73.2 kg)   07/02/20 174 lb 3.2 oz (79 kg)        []Negative depression screening. PHQ Scores 7/6/2021 1/5/2021 7/2/2020 2/14/2019 11/14/2018 4/5/2018 4/5/2017   PHQ2 Score 0 0 0 0 - 0 0   PHQ9 Score 0 0 0 0 0 0 0      []1-4 = Minimal depression   []5-9 = Milddepression   []10-14 = Moderate depression   []15-19 = Moderately severe depression   []20-27 = Severe depression    Discussed testing with the patient and all questions fully answered.     Hospital Outpatient Visit on 01/04/2022   Component Date Value Ref Range Status    TSH 01/04/2022 4.18  0.30 - 5.00 mIU/L Final    WBC 01/04/2022 13.1* 3.5 - 11.3 k/uL Final    RBC 01/04/2022 4.60  4.21 - 5.77 m/uL Final    Hemoglobin 01/04/2022 14.0  13.0 - 17.0 g/dL Final    Hematocrit 01/04/2022 42.4  40.7 - 50.3 % Final    MCV 01/04/2022 92.2  82.6 - 102.9 fL Final    MCH 01/04/2022 30.4  25.2 - 33.5 pg Final    MCHC 01/04/2022 33.0  28.4 - 34.8 g/dL Final    RDW 01/04/2022 16.3* 11.8 - 14.4 % Final    Platelets 17/72/1806 178  138 - 453 k/uL Final    MPV 01/04/2022 12.2  8.1 - 13.5 fL Final    NRBC Automated 01/04/2022 0.0  0.0 per 100 WBC Final    Differential Type 01/04/2022 NOT REPORTED   Final    WBC Morphology 01/04/2022 NOT REPORTED   Final    RBC Morphology 01/04/2022 NOT REPORTED   Final    Platelet Estimate 42/05/2784 NOT REPORTED   Final    Immature Granulocytes 01/04/2022 4* 0 % Final    Seg Neutrophils 01/04/2022 36  36 - 66 % Final    Lymphocytes 01/04/2022 44  24 - 44 % Final    Atypical Lymphocytes 01/04/2022 5  % Final    Monocytes 01/04/2022 11* 1 - 7 % Final    Eosinophils % 01/04/2022 0* 1 - 4 % Final    Basophils 01/04/2022 0  0 - 2 % Final    Absolute Immature Granulocyte 01/04/2022 0.52* 0.00 - 0.30 k/uL Final    Segs Absolute 01/04/2022 4.72  1.8 - 7.7 k/uL Final    Absolute Lymph # 01/04/2022 5.76* 1.0 - 4.8 k/uL Final    Atypical Lymphocytes Absolute 01/04/2022 0.66  k/uL Final    Absolute Mono # 01/04/2022 1.44* 0.1 - 0.8 k/uL Final    Absolute Eos # 01/04/2022 0.00  0.0 - 0.4 k/uL Final    Basophils Absolute 01/04/2022 0.00  0.0 - 0.2 k/uL Final    Morphology 01/04/2022 ANISOCYTOSIS PRESENT   Final    Glucose 01/04/2022 122* 70 - 99 mg/dL Final    BUN 01/04/2022 37* 8 - 23 mg/dL Final    CREATININE 01/04/2022 1.35* 0.70 - 1.20 mg/dL Final    Bun/Cre Ratio 01/04/2022 NOT REPORTED  9 - 20 Final    Calcium 01/04/2022 10.6* 8.6 - 10.4 mg/dL Final    Sodium 01/04/2022 136  135 - 144 mmol/L Final    Potassium 01/04/2022 4.0  3.7 - 5.3 mmol/L Final    Chloride 01/04/2022 102  98 - 107 mmol/L Final    CO2 01/04/2022 24  20 - 31 mmol/L Final    Anion Gap 01/04/2022 10  9 - 17 mmol/L Final    Alkaline Phosphatase 01/04/2022 61  40 - 129 U/L Final    ALT 01/04/2022 31  5 - 41 U/L Final    AST 01/04/2022 51* <40 U/L Final    Total Bilirubin 01/04/2022 0.46  0.3 - 1.2 mg/dL Final    Total Protein 01/04/2022 5.9* 6.4 - 8.3 g/dL Final    Albumin 01/04/2022 3.3* 3.5 - 5.2 g/dL Final    Albumin/Globulin Ratio 01/04/2022 1.3  1.0 - 2.5 Final    GFR Non- 01/04/2022 52* >60 mL/min Final    GFR  01/04/2022 >60  >60 mL/min Final    GFR Comment 01/04/2022        Final    Comment: Average GFR for 61-76 years old:   80 mL/min/1.73sq m  Chronic Kidney Disease:   <60 mL/min/1.73sq m  Kidney failure:   <15 mL/min/1.73sq m              eGFR calculated using average adult body mass.  Additional eGFR calculator available at:        [email or web address]            GFR Staging 01/04/2022 NOT REPORTED   Final    Cholesterol 01/04/2022 120  <200 mg/dL Final    Comment:    Cholesterol Guidelines:      <200  Desirable   200-240  Borderline      >240  Undesirable         HDL 01/04/2022 20* >40 mg/dL Final    Comment:    HDL Guidelines:    <40     Undesirable   40-59    Borderline    >59     Desirable         LDL Cholesterol 01/04/2022 68  0 - 130 mg/dL Final    Comment:    LDL Guidelines:     <100    Desirable   100-129   Near to/above Desirable   130-159   Borderline      >159   Undesirable     Direct (measured) LDL and calculated LDL are not interchangeable tests.  Chol/HDL Ratio 01/04/2022 6.0* <5 Final            Triglycerides 01/04/2022 160* <150 mg/dL Final    Comment:    Triglyceride Guidelines:     <150   Desirable   150-199  Borderline   200-499  High     >499   Very high   Based on AHA Guidelines for fasting triglyceride, October 2012.          VLDL 01/04/2022 NOT REPORTED* 1 - 30 mg/dL Final         Most recent labs reviewed:     Lab Results   Component Value Date    WBC 13.1 (H) 01/04/2022    HGB 14.0 01/04/2022    HCT 42.4 01/04/2022    MCV 92.2 01/04/2022     01/04/2022       @BRIEFLAB(NA,K,CL,CO2,BUN,CREATININE,GLUCOSE,CALCIUM)@     Lab Results   Component Value Date    ALT 31 01/04/2022    AST 51 (H) 01/04/2022    ALKPHOS 61 01/04/2022    BILITOT 0.46 01/04/2022       Lab Results   Component Value Date    TSH 4.18 01/04/2022       Lab Results   Component Value Date    CHOL 120 01/04/2022    CHOL 137 07/06/2020    CHOL 135 05/29/2019     Lab Results   Component Value Date    TRIG 160 (H) 01/04/2022    TRIG 134 07/06/2020    TRIG 115 05/29/2019     Lab Results   Component Value Date    HDL 20 (L) 01/04/2022    HDL 34 (L) 07/06/2020    HDL 35 (L) 05/29/2019     Lab Results   Component Value Date    LDLCHOLESTEROL 68 01/04/2022    LDLCHOLESTEROL 76 07/06/2020    LDLCHOLESTEROL 77 05/29/2019     Lab Results   Component Value Date    VLDL NOT REPORTED (H) 01/04/2022 VLDL NOT REPORTED 07/06/2020    VLDL NOT REPORTED 05/29/2019     Lab Results   Component Value Date    CHOLHDLRATIO 6.0 (H) 01/04/2022    CHOLHDLRATIO 4.0 07/06/2020    CHOLHDLRATIO 3.9 05/29/2019       Lab Results   Component Value Date    LABA1C 6.0 07/06/2021       Lab Results   Component Value Date    XWZOBHLJ30 387 05/30/2019       Lab Results   Component Value Date    FOLATE >20.0 05/30/2019       Lab Results   Component Value Date    IRON 68 05/30/2019    TIBC 276 05/30/2019    FERRITIN 280 05/30/2019       Lab Results   Component Value Date    VITD25 51.1 06/29/2021             Current Outpatient Medications   Medication Sig Dispense Refill    simvastatin (ZOCOR) 10 MG tablet TAKE 1 TABLET NIGHTLY 90 tablet 3    triamterene-hydroCHLOROthiazide (DYAZIDE) 37.5-25 MG per capsule Take 1 capsule by mouth daily TAKE 1 CAPSULE DAILY 90 capsule 2    potassium chloride (MICRO-K) 10 MEQ extended release capsule TAKE 1 CAPSULE TWICE DAILY 180 capsule 3    losartan (COZAAR) 100 MG tablet TAKE 1 TABLET DAILY 90 tablet 3    amLODIPine (NORVASC) 5 MG tablet TAKE 1 TABLET DAILY 90 tablet 3    atenolol (TENORMIN) 50 MG tablet TAKE 1 AND 1/2 TABLETS     DAILY 135 tablet 3    sitaGLIPtan-metFORMIN (JANUMET)  MG per tablet TAKE 1 TABLET DAILY  WITH MEALS 90 tablet 1    ONE TOUCH ULTRASOFT LANCETS MISC 1 each by Does not apply route daily 100 each 3    blood glucose monitor strips Test blood sugar once daily 100 strip 3    ONETOUCH DELICA LANCETS FINE MISC DX:E11.65 PATIENT TO TEST 2-3 TIMES DAILY  11    vitamin D (CHOLECALCIFEROL) 1000 UNIT TABS tablet Take 1,000 Units by mouth daily      fluticasone (FLONASE) 50 MCG/ACT nasal spray 1 spray by Nasal route daily      loratadine (CLARITIN) 10 MG tablet Take 10 mg by mouth daily as needed       folic acid (FOLVITE) 1 MG tablet Take 1 mg by mouth daily      methotrexate (RHEUMATREX) 2.5 MG chemo tablet Take 2.5 mg by mouth once a week 10 tabs weekly      inFLIXimab (REMICADE) 100 MG injection Infuse 5 mg/kg intravenously See Admin Instructions Every 8 weeks       No current facility-administered medications for this visit. Social History     Socioeconomic History    Marital status:      Spouse name: Not on file    Number of children: Not on file    Years of education: Not on file    Highest education level: Not on file   Occupational History    Not on file   Tobacco Use    Smoking status: Never Smoker    Smokeless tobacco: Never Used   Vaping Use    Vaping Use: Never used   Substance and Sexual Activity    Alcohol use: No     Alcohol/week: 0.0 standard drinks    Drug use: No    Sexual activity: Yes     Partners: Female   Other Topics Concern    Not on file   Social History Narrative    Not on file     Social Determinants of Health     Financial Resource Strain: Low Risk     Difficulty of Paying Living Expenses: Not very hard   Food Insecurity: No Food Insecurity    Worried About Running Out of Food in the Last Year: Never true    Rosa of Food in the Last Year: Never true   Transportation Needs:     Lack of Transportation (Medical): Not on file    Lack of Transportation (Non-Medical):  Not on file   Physical Activity:     Days of Exercise per Week: Not on file    Minutes of Exercise per Session: Not on file   Stress:     Feeling of Stress : Not on file   Social Connections:     Frequency of Communication with Friends and Family: Not on file    Frequency of Social Gatherings with Friends and Family: Not on file    Attends Sabianist Services: Not on file    Active Member of Clubs or Organizations: Not on file    Attends Club or Organization Meetings: Not on file    Marital Status: Not on file   Intimate Partner Violence:     Fear of Current or Ex-Partner: Not on file    Emotionally Abused: Not on file    Physically Abused: Not on file    Sexually Abused: Not on file   Housing Stability:     Unable to Pay for Housing in the Last Year: Not on file    Number of Places Lived in the Last Year: Not on file    Unstable Housing in the Last Year: Not on file     Counseling given: Not Answered        Family History   Problem Relation Age of Onset    Other Mother         mild dysplasia    Cancer Father         throat, lung             -rest of complaints with corresponding details per ROS    The patient's past medical, surgical, social, and family history as well as his current medications and allergies were reviewed as documented intoday's encounter. Review of Systems        Physical Exam    ***    ASSESSMENT AND PLAN      1. Elevated creatine kinase  ***  - Unique Nicholson MD, Hematology/Oncology, [place]  - Path Review, Smear; Future  - Hemoglobinopathy Eval (Electrophoresis); Future    2. Monocytosis  ***  - Unique Nicholson MD, Hematology/Oncology, [place]  - Path Review, Smear; Future  - Hemoglobinopathy Eval (Electrophoresis); Future    3. Anemia, unspecified type  ***  - Unique Nicholson MD, Hematology/Oncology, [place]  - Path Review, Smear; Future  - Hemoglobinopathy Eval (Electrophoresis); Future      Orders Placed This Encounter   Procedures    Path Review, Smear     Standing Status:   Future     Standing Expiration Date:   1/5/2023    Hemoglobinopathy Eval (Electrophoresis)     Standing Status:   Future     Standing Expiration Date:   1/5/2023   Chacha Knapp MD, Hematology/Oncology, [place]     Referral Priority:   Routine     Referral Type:   Eval and Treat     Referral Reason:   Specialty Services Required     Referred to Provider:   Taina Liz MD     Requested Specialty:   Oncology     Number of Visits Requested:   1         There are no discontinued medications. {Provider [id number]:[id number]}    The patient'spast medical, surgical, social, and family history as well as his   current medications and allergies were reviewed as documented in today's encounter. Medications, labs, diagnostic studies, consultations andfollow-up as documented in this encounter. No follow-ups on file. Patient wasseen with total face to face time of *** minutes. More than 50% of this visit was counseling and education. Future Appointments   Date Time Provider Papito Simms   1/6/2022 11:30 AM Kamila Rojas MD [facility]     This note was completed by using the assistance of a speech-recognition program. However, inadvertent computerized transcription errors may be present. Althoughevery effort was made to ensure accuracy, no guarantees can be provided that every mistake has been identified and corrected by editing.   Electronically signed by Kamila Rojas MD on 1/5/2022  10:08 PM

## 2022-01-06 NOTE — PROGRESS NOTES
Visit Information    Have you changed or started any medications since your last visit including any over-the-counter medicines, vitamins, or herbal medicines? no   Are you having any side effects from any of your medications? -  no  Have you stopped taking any of your medications? Is so, why? -  no    Have you seen any other physician or provider since your last visit? Yes - Records Obtained  Have you had any other diagnostic tests since your last visit? Yes - Records Obtained  Have you been seen in the emergency room and/or had an admission to a hospital since we last saw you? No  Have you had your routine dental cleaning in the past 6 months? yes     Have you activated your [employer] account? If not, what are your barriers?  Yes     Patient Care Team:  Joel Light MD as PCP - General (Family Medicine)  Joel Light MD as PCP - [facility]  Adalgisa Gilman MD as Consulting Physician (Internal Medicine)  Enedina Cohen MD as Consulting Physician (Gastroenterology)    Medical History Review  Past Medical, Family, and Social History reviewed and does contribute to the patient presenting condition    Health Maintenance   Topic Date Due    Diabetic retinal exam  Never done    DTaP/Tdap/Td vaccine (1 - Tdap) Never done    Shingles Vaccine (1 of 2) Never done    Diabetic foot exam  11/14/2019    Annual Wellness Visit (AWV)  07/03/2021    Colon cancer screen colonoscopy  12/14/2021    Diabetic microalbuminuria test  06/29/2022    A1C test (Diabetic or Prediabetic)  07/06/2022    Depression Screen  07/06/2022    Lipid screen  01/04/2023    Potassium monitoring  01/04/2023    Creatinine monitoring  01/04/2023    Flu vaccine  Completed    Pneumococcal 65+ years Vaccine  Completed    COVID-19 Vaccine  Completed    Hepatitis C screen  Completed    Hepatitis A vaccine  Aged Out    Hib vaccine  Aged Out    Meningococcal (ACWY) vaccine  Aged Out

## 2022-01-06 NOTE — PROGRESS NOTES
Chief Complaint   Patient presents with    Diabetes    Hyperlipidemia    Hypertension         Geo Lackey  here today for follow up on chronic medical problems, go over labs and/or diagnostic studies, and medication refills. Diabetes, Hyperlipidemia, and Hypertension      HPI: Patient is here for routine follow-up for chronic medical problems. History of diabetes controlled A1c 6.1 which is stable since last few years. Patient is on Janumet, reports he has no appetite he does not eat much. He wants to cut down on medications. He has mildly elevated creatinine on recent blood work. Hypertension controlled denies any chest pain shortness of breath. Hyperlipidemia stable on statins. Patient had recent blood work done that showed some changes in blood counts, he has monocytosis, mild anemia elevated creatinine. Patient had further blood work ordered. He complains of fatigue and tiredness decreased appetite weight loss. His weight is fluctuating between 180 and 160. Patient has multiple myeloma-like picture on blood work. Patient is also due for colonoscopy advised to schedule that. He has also has rheumatoid arthritis and is on methotrexate and  infliximab which can also cause these type of side effects. BP (!) 106/100   Pulse 100   Temp 98 °F (36.7 °C)   Ht 5' 8\" (1.727 m)   Wt 160 lb (72.6 kg)   SpO2 97%   BMI 24.33 kg/m²    Body mass index is 24.33 kg/m². Wt Readings from Last 3 Encounters:   01/06/22 160 lb (72.6 kg)   07/06/21 184 lb (83.5 kg)   01/05/21 161 lb 6.4 oz (73.2 kg)        [x]Negative depression screening.   PHQ Scores 7/6/2021 1/5/2021 7/2/2020 2/14/2019 11/14/2018 4/5/2018 4/5/2017   PHQ2 Score 0 0 0 0 - 0 0   PHQ9 Score 0 0 0 0 0 0 0      []1-4 = Minimal depression   []5-9 = Milddepression   []10-14 = Moderate depression   []15-19 = Moderately severe depression   []20-27 = Severe depression    Discussed testing with the patient and all questions fully answered.     Hospital Outpatient Visit on 01/04/2022   Component Date Value Ref Range Status    TSH 01/04/2022 4.18  0.30 - 5.00 mIU/L Final    WBC 01/04/2022 13.1* 3.5 - 11.3 k/uL Final    RBC 01/04/2022 4.60  4.21 - 5.77 m/uL Final    Hemoglobin 01/04/2022 14.0  13.0 - 17.0 g/dL Final    Hematocrit 01/04/2022 42.4  40.7 - 50.3 % Final    MCV 01/04/2022 92.2  82.6 - 102.9 fL Final    MCH 01/04/2022 30.4  25.2 - 33.5 pg Final    MCHC 01/04/2022 33.0  28.4 - 34.8 g/dL Final    RDW 01/04/2022 16.3* 11.8 - 14.4 % Final    Platelets 75/33/7750 178  138 - 453 k/uL Final    MPV 01/04/2022 12.2  8.1 - 13.5 fL Final    NRBC Automated 01/04/2022 0.0  0.0 per 100 WBC Final    Differential Type 01/04/2022 NOT REPORTED   Final    WBC Morphology 01/04/2022 NOT REPORTED   Final    RBC Morphology 01/04/2022 NOT REPORTED   Final    Platelet Estimate 00/89/3957 NOT REPORTED   Final    Immature Granulocytes 01/04/2022 4* 0 % Final    Seg Neutrophils 01/04/2022 36  36 - 66 % Final    Lymphocytes 01/04/2022 44  24 - 44 % Final    Atypical Lymphocytes 01/04/2022 5  % Final    Monocytes 01/04/2022 11* 1 - 7 % Final    Eosinophils % 01/04/2022 0* 1 - 4 % Final    Basophils 01/04/2022 0  0 - 2 % Final    Absolute Immature Granulocyte 01/04/2022 0.52* 0.00 - 0.30 k/uL Final    Segs Absolute 01/04/2022 4.72  1.8 - 7.7 k/uL Final    Absolute Lymph # 01/04/2022 5.76* 1.0 - 4.8 k/uL Final    Atypical Lymphocytes Absolute 01/04/2022 0.66  k/uL Final    Absolute Mono # 01/04/2022 1.44* 0.1 - 0.8 k/uL Final    Absolute Eos # 01/04/2022 0.00  0.0 - 0.4 k/uL Final    Basophils Absolute 01/04/2022 0.00  0.0 - 0.2 k/uL Final    Morphology 01/04/2022 ANISOCYTOSIS PRESENT   Final    Glucose 01/04/2022 122* 70 - 99 mg/dL Final    BUN 01/04/2022 37* 8 - 23 mg/dL Final    CREATININE 01/04/2022 1.35* 0.70 - 1.20 mg/dL Final    Bun/Cre Ratio 01/04/2022 NOT REPORTED  9 - 20 Final    Calcium 01/04/2022 10.6* 8.6 - 10.4 mg/dL Final    Sodium 01/04/2022 136  135 - 144 mmol/L Final    Potassium 01/04/2022 4.0  3.7 - 5.3 mmol/L Final    Chloride 01/04/2022 102  98 - 107 mmol/L Final    CO2 01/04/2022 24  20 - 31 mmol/L Final    Anion Gap 01/04/2022 10  9 - 17 mmol/L Final    Alkaline Phosphatase 01/04/2022 61  40 - 129 U/L Final    ALT 01/04/2022 31  5 - 41 U/L Final    AST 01/04/2022 51* <40 U/L Final    Total Bilirubin 01/04/2022 0.46  0.3 - 1.2 mg/dL Final    Total Protein 01/04/2022 5.9* 6.4 - 8.3 g/dL Final    Albumin 01/04/2022 3.3* 3.5 - 5.2 g/dL Final    Albumin/Globulin Ratio 01/04/2022 1.3  1.0 - 2.5 Final    GFR Non- 01/04/2022 52* >60 mL/min Final    GFR  01/04/2022 >60  >60 mL/min Final    GFR Comment 01/04/2022        Final    Comment: Average GFR for 61-76 years old:   80 mL/min/1.73sq m  Chronic Kidney Disease:   <60 mL/min/1.73sq m  Kidney failure:   <15 mL/min/1.73sq m              eGFR calculated using average adult body mass. Additional eGFR calculator available at:        [employer]eg[employer]            GFR Staging 01/04/2022 NOT REPORTED   Final    Cholesterol 01/04/2022 120  <200 mg/dL Final    Comment:    Cholesterol Guidelines:      <200  Desirable   200-240  Borderline      >240  Undesirable         HDL 01/04/2022 20* >40 mg/dL Final    Comment:    HDL Guidelines:    <40     Undesirable   40-59    Borderline    >59     Desirable         LDL Cholesterol 01/04/2022 68  0 - 130 mg/dL Final    Comment:    LDL Guidelines:     <100    Desirable   100-129   Near to/above Desirable   130-159   Borderline      >159   Undesirable     Direct (measured) LDL and calculated LDL are not interchangeable tests.       Chol/HDL Ratio 01/04/2022 6.0* <5 Final            Triglycerides 01/04/2022 160* <150 mg/dL Final    Comment:    Triglyceride Guidelines:     <150   Desirable   150-199  Borderline   200-499  High     >499   Very high   Based on AHA Guidelines for fasting triglyceride, October 2012.          VLDL 01/04/2022 NOT REPORTED* 1 - 30 mg/dL Final         Most recent labs reviewed:     Lab Results   Component Value Date    WBC 13.1 (H) 01/04/2022    HGB 14.0 01/04/2022    HCT 42.4 01/04/2022    MCV 92.2 01/04/2022     01/04/2022       @BRIEFLAB(NA,K,CL,CO2,BUN,CREATININE,GLUCOSE,CALCIUM)@     Lab Results   Component Value Date    ALT 31 01/04/2022    AST 51 (H) 01/04/2022    ALKPHOS 61 01/04/2022    BILITOT 0.46 01/04/2022       Lab Results   Component Value Date    TSH 4.18 01/04/2022       Lab Results   Component Value Date    CHOL 120 01/04/2022    CHOL 137 07/06/2020    CHOL 135 05/29/2019     Lab Results   Component Value Date    TRIG 160 (H) 01/04/2022    TRIG 134 07/06/2020    TRIG 115 05/29/2019     Lab Results   Component Value Date    HDL 20 (L) 01/04/2022    HDL 34 (L) 07/06/2020    HDL 35 (L) 05/29/2019     Lab Results   Component Value Date    LDLCHOLESTEROL 68 01/04/2022    LDLCHOLESTEROL 76 07/06/2020    LDLCHOLESTEROL 77 05/29/2019     Lab Results   Component Value Date    VLDL NOT REPORTED (H) 01/04/2022    VLDL NOT REPORTED 07/06/2020    VLDL NOT REPORTED 05/29/2019     Lab Results   Component Value Date    CHOLHDLRATIO 6.0 (H) 01/04/2022    CHOLHDLRATIO 4.0 07/06/2020    CHOLHDLRATIO 3.9 05/29/2019       Lab Results   Component Value Date    LABA1C 6.1 01/06/2022       Lab Results   Component Value Date    MCWLWLLK11 387 05/30/2019       Lab Results   Component Value Date    FOLATE >20.0 05/30/2019       Lab Results   Component Value Date    IRON 68 05/30/2019    TIBC 276 05/30/2019    FERRITIN 280 05/30/2019       Lab Results   Component Value Date    VITD25 51.1 06/29/2021             Current Outpatient Medications   Medication Sig Dispense Refill    SITagliptin (JANUVIA) 50 MG tablet Take 1 tablet by mouth daily 90 tablet 5    simvastatin (ZOCOR) 10 MG tablet TAKE 1 TABLET NIGHTLY 90 tablet 3    triamterene-hydroCHLOROthiazide (DYAZIDE) 37.5-25 MG per capsule Take 1 capsule by mouth daily TAKE 1 CAPSULE DAILY 90 capsule 2    potassium chloride (MICRO-K) 10 MEQ extended release capsule TAKE 1 CAPSULE TWICE DAILY 180 capsule 3    amLODIPine (NORVASC) 5 MG tablet TAKE 1 TABLET DAILY 90 tablet 3    atenolol (TENORMIN) 50 MG tablet TAKE 1 AND 1/2 TABLETS     DAILY 135 tablet 3    ONE TOUCH ULTRASOFT LANCETS MISC 1 each by Does not apply route daily 100 each 3    blood glucose monitor strips Test blood sugar once daily 100 strip 3    ONETOUCH DELICA LANCETS FINE MISC DX:E11.65 PATIENT TO TEST 2-3 TIMES DAILY  11    vitamin D (CHOLECALCIFEROL) 1000 UNIT TABS tablet Take 1,000 Units by mouth daily      fluticasone (FLONASE) 50 MCG/ACT nasal spray 1 spray by Nasal route daily      loratadine (CLARITIN) 10 MG tablet Take 10 mg by mouth daily as needed       folic acid (FOLVITE) 1 MG tablet Take 1 mg by mouth daily      methotrexate (RHEUMATREX) 2.5 MG chemo tablet Take 2.5 mg by mouth once a week 10 tabs weekly      inFLIXimab (REMICADE) 100 MG injection Infuse 5 mg/kg intravenously See Admin Instructions Every 8 weeks      valACYclovir (VALTREX) 500 MG tablet       losartan (COZAAR) 100 MG tablet TAKE 1 TABLET DAILY (Patient not taking: Reported on 1/6/2022) 90 tablet 3     No current facility-administered medications for this visit.              Social History     Socioeconomic History    Marital status:      Spouse name: Not on file    Number of children: Not on file    Years of education: Not on file    Highest education level: Not on file   Occupational History    Not on file   Tobacco Use    Smoking status: Never Smoker    Smokeless tobacco: Never Used   Vaping Use    Vaping Use: Never used   Substance and Sexual Activity    Alcohol use: No     Alcohol/week: 0.0 standard drinks    Drug use: No    Sexual activity: Yes     Partners: Female   Other Topics Concern    Not on file Social History Narrative    Not on file     Social Determinants of Health     Financial Resource Strain: Low Risk     Difficulty of Paying Living Expenses: Not very hard   Food Insecurity: No Food Insecurity    Worried About Running Out of Food in the Last Year: Never true    Rosa of Food in the Last Year: Never true   Transportation Needs:     Lack of Transportation (Medical): Not on file    Lack of Transportation (Non-Medical): Not on file   Physical Activity:     Days of Exercise per Week: Not on file    Minutes of Exercise per Session: Not on file   Stress:     Feeling of Stress : Not on file   Social Connections:     Frequency of Communication with Friends and Family: Not on file    Frequency of Social Gatherings with Friends and Family: Not on file    Attends Jain Services: Not on file    Active Member of [address] [place] [address] [employer] or Organizations: Not on file    Attends Club or Organization Meetings: Not on file    Marital Status: Not on file   Intimate Partner Violence:     Fear of Current or Ex-Partner: Not on file    Emotionally Abused: Not on file    Physically Abused: Not on file    Sexually Abused: Not on file   Housing Stability:     Unable to Pay for Housing in the Last Year: Not on file    Number of [place] in the Last Year: Not on file    Unstable Housing in the Last Year: Not on file     Counseling given: Not Answered        Family History   Problem Relation Age of Onset    Other Mother         mild dysplasia    Cancer Father         throat, lung             -rest of complaints with corresponding details per ROS    The patient's past medical, surgical, social, and family history as well as his current medications and allergies were reviewed as documented intoday's encounter. Review of Systems   Constitutional: Positive for activity change, appetite change and fatigue. Negative for diaphoresis, fever and unexpected weight change.    HENT: Negative for congestion, postnasal drip and sinus pressure. Eyes: Positive for visual disturbance. Negative for photophobia. Respiratory: Negative for cough, chest tightness, shortness of breath and wheezing. Cardiovascular: Negative for chest pain, palpitations and leg swelling. Gastrointestinal: Negative for abdominal distention, blood in stool, diarrhea and vomiting. Endocrine: Negative for polyuria. Genitourinary: Negative for difficulty urinating, frequency, hematuria and urgency. Musculoskeletal: Positive for arthralgias, back pain, gait problem, joint swelling and myalgias. Negative for neck pain. Skin: Negative for color change and rash. Neurological: Positive for numbness. Negative for dizziness, speech difficulty, weakness, light-headedness and headaches. Psychiatric/Behavioral: Negative for agitation, decreased concentration, hallucinations, self-injury, sleep disturbance and suicidal ideas. The patient is nervous/anxious. Physical Exam  Vitals and nursing note reviewed. Constitutional:       Appearance: Normal appearance. HENT:      Head: Normocephalic and atraumatic. Nose: Nose normal. No congestion or rhinorrhea. Mouth/Throat:      Mouth: Mucous membranes are moist.   Eyes:      General:         Right eye: No foreign body. Extraocular Movements: Extraocular movements intact. Cardiovascular:      Rate and Rhythm: Normal rate and regular rhythm. Pulses:           Dorsalis pedis pulses are 3+ on the right side and 3+ on the left side. Heart sounds: Normal heart sounds. Pulmonary:      Breath sounds: Normal breath sounds. No decreased air movement. No decreased breath sounds, wheezing or rhonchi. Abdominal:      General: Abdomen is protuberant. Bowel sounds are normal.      Palpations: Abdomen is soft. Tenderness: There is no abdominal tenderness. There is no right CVA tenderness or left CVA tenderness. Musculoskeletal:      Right hand: Bony tenderness present. Decreased range of motion. Left hand: Decreased range of motion. Cervical back: Deformity and spasms present. Decreased range of motion. Thoracic back: Deformity and spasms present. Decreased range of motion. Lumbar back: Spasms present. Right foot: Decreased range of motion. Left foot: Decreased range of motion. Feet:      Right foot:      Protective Sensation: 5 sites tested. 5 sites sensed. Skin integrity: No blister. Left foot:      Protective Sensation: 5 sites tested. 5 sites sensed. Lymphadenopathy:      Cervical: No cervical adenopathy. Skin:     General: Skin is warm. Neurological:      Mental Status: He is oriented to person, place, and time. He is lethargic. Cranial Nerves: Cranial nerves are intact. Sensory: Sensation is intact. Motor: No weakness. Coordination: Romberg sign negative. Psychiatric:         Attention and Perception: Attention and perception normal.         Mood and Affect: Mood is anxious. Mood is not depressed. Affect is not tearful. Speech: He is communicative. Speech is not rapid and pressured. Behavior: Behavior normal. Behavior is not agitated or slowed. Cognition and Memory: Cognition normal. Cognition is not impaired. ASSESSMENT AND PLAN      1. Type 2 diabetes mellitus without complication, without long-term current use of insulin (HCC)  Controlled continue checking blood sugars, discontinue metformin due to elevated creatinine and low appetite. Continue Januvia only. Monitor blood sugars  -  DIABETES FOOT EXAM  - Hemoglobin A1C; Future  - Hemoglobin A1C  - SITagliptin (JANUVIA) 50 MG tablet; Take 1 tablet by mouth daily  Dispense: 90 tablet; Refill: 5    2. Anemia, unspecified type  New patient referred to hematologist to rule out multiple myeloma    3. Elevated serum creatinine  Further labs ordered. Patient informed about the lab results.     4. Rheumatoid arthritis involving multiple joints ([facility] [address]  Could be possibility of side effect from infliximab. 5. Monocytosis  Follow-up with hematologist    6. Essential hypertension  Controlled continue same medications    7. Vitamin D deficiency  Continue vitamin D supplements    8. Mixed hyperlipidemia  Continue statins      Orders Placed This Encounter   Procedures    Hemoglobin A1C     Standing Status:   Future     Number of Occurrences:   1     Standing Expiration Date:   1/5/2023     DIABETES FOOT EXAM         Medications Discontinued During This Encounter   Medication Reason    sitaGLIPtan-metFORMIN (JANUMET)  MG per tablet Alternate therapy       Carlos Enrique received counseling on the following healthy behaviors: nutrition, exercise and medication adherence  Reviewed prior labs and health maintenance  Continue current medications, diet and exercise. Discussed use, benefit, and side effects of prescribed medications. Barriers to medication compliance addressed. Patient given educational materials - see patient instructions  Was a self-tracking handout given in paper form or via mechatronic [employer]? Yes    Requested Prescriptions     Signed Prescriptions Disp Refills    SITagliptin (JANUVIA) 50 MG tablet 90 tablet 5     Sig: Take 1 tablet by mouth daily       All patient questions answered. Patient voiced understanding. Quality Measures    Body mass index is 24.33 kg/m². Normal. Weight control planned discussed Healthy diet and regular exercise. BP: (!) 106/100. Blood pressure is normal. Treatment plan consists of No treatment change needed. Fall Risk 7/6/2021 7/2/2020 2/14/2019 4/5/2018 4/5/2017   2 or more falls in past year? no no no no no   Fall with injury in past year? no no no no no     The patient does not have a history of falls. I did , complete a risk assessment for falls.  A plan of care for falls in-office gait and balance testing performed using The Timed Up and Go Test was negative for increased falls risk- no further intervention is currently indicated, home safety tips provided, No Treatment plan indicated    Lab Results   Component Value Date    LDLCHOLESTEROL 68 01/04/2022    (goal LDL reduction with dx if diabetes is 50% LDL reduction)    PHQ Scores 7/6/2021 1/5/2021 7/2/2020 2/14/2019 11/14/2018 4/5/2018 4/5/2017   PHQ2 Score 0 0 0 0 - 0 0   PHQ9 Score 0 0 0 0 0 0 0     Interpretation of Total Score Depression Severity: 1-4 = Minimal depression, 5-9 = Mild depression, 10-14 = Moderate depression, 15-19 = Moderately severe depression, 20-27 = Severe depression      The patient'spast medical, surgical, social, and family history as well as his   current medications and allergies were reviewed as documented in today's encounter. Medications, labs, diagnostic studies, consultations andfollow-up as documented in this encounter. Return in about 3 months (around 4/6/2022). Patient wasseen with total face to face time of 30 minutes. More than 50% of this visit was counseling and education. Future Appointments   Date Time Provider Papito Simms   4/7/2022 11:00 AM Taryn Whyte MD [email or web address]     This note was completed by using the assistance of a speech-recognition program. However, inadvertent computerized transcription errors may be present. Althoughevery effort was made to ensure accuracy, no guarantees can be provided that every mistake has been identified and corrected by editing.   Electronically signed by Taryn Whyte MD on 1/6/2022  12:31 PM

## 2022-01-07 LAB
HGB ELECTROPHORESIS INTERP: NORMAL
PATHOLOGIST REVIEW: NORMAL
PATHOLOGIST: NORMAL
SURGICAL PATHOLOGY REPORT: NORMAL

## 2022-01-21 ENCOUNTER — TELEPHONE (OUTPATIENT)
Dept: ONCOLOGY | Age: 72
End: 2022-01-21

## 2022-01-21 ENCOUNTER — INITIAL CONSULT (OUTPATIENT)
Dept: ONCOLOGY | Age: 72
End: 2022-01-21
Payer: MEDICARE

## 2022-01-21 ENCOUNTER — HOSPITAL ENCOUNTER (OUTPATIENT)
Age: 72
Discharge: HOME | End: 2022-01-21
Payer: MEDICARE

## 2022-01-21 VITALS
RESPIRATION RATE: 16 BRPM | TEMPERATURE: 98.6 F | SYSTOLIC BLOOD PRESSURE: 105 MMHG | DIASTOLIC BLOOD PRESSURE: 65 MMHG | WEIGHT: 160 LBS | BODY MASS INDEX: 25.11 KG/M2 | HEART RATE: 69 BPM | HEIGHT: 67 IN

## 2022-01-21 DIAGNOSIS — D72.821 MONOCYTOSIS: ICD-10-CM

## 2022-01-21 DIAGNOSIS — D72.821 MONOCYTOSIS: Primary | ICD-10-CM

## 2022-01-21 LAB
ABSOLUTE EOS #: 0.2 K/UL (ref 0–0.4)
ABSOLUTE IMMATURE GRANULOCYTE: ABNORMAL K/UL (ref 0–0.3)
ABSOLUTE LYMPH #: 3.1 K/UL (ref 1–4.8)
ABSOLUTE MONO #: 0.6 K/UL (ref 0.1–1.2)
BASOPHILS # BLD: 1 % (ref 0–2)
BASOPHILS ABSOLUTE: 0.1 K/UL (ref 0–0.2)
DIFFERENTIAL TYPE: ABNORMAL
EOSINOPHILS RELATIVE PERCENT: 2 % (ref 1–4)
HCT VFR BLD CALC: 36.1 % (ref 41–53)
HEMOGLOBIN: 12.4 G/DL (ref 13.5–17.5)
IMMATURE GRANULOCYTES: ABNORMAL %
LYMPHOCYTES # BLD: 37 % (ref 24–44)
MCH RBC QN AUTO: 31.3 PG (ref 26–34)
MCHC RBC AUTO-ENTMCNC: 34.3 G/DL (ref 31–37)
MCV RBC AUTO: 91.5 FL (ref 80–100)
MONOCYTES # BLD: 7 % (ref 2–11)
NRBC AUTOMATED: ABNORMAL PER 100 WBC
PDW BLD-RTO: 16.2 % (ref 12.5–15.4)
PLATELET # BLD: 149 K/UL (ref 140–450)
PLATELET ESTIMATE: ABNORMAL
PMV BLD AUTO: 9.2 FL (ref 6–12)
RBC # BLD: 3.94 M/UL (ref 4.5–5.9)
RBC # BLD: ABNORMAL 10*6/UL
SEG NEUTROPHILS: 53 % (ref 36–66)
SEGMENTED NEUTROPHILS ABSOLUTE COUNT: 4.4 K/UL (ref 1.8–7.7)
WBC # BLD: 8.3 K/UL (ref 3.5–11)
WBC # BLD: ABNORMAL 10*3/UL

## 2022-01-21 PROCEDURE — G8484 FLU IMMUNIZE NO ADMIN: HCPCS | Performed by: INTERNAL MEDICINE

## 2022-01-21 PROCEDURE — G8427 DOCREV CUR MEDS BY ELIG CLIN: HCPCS | Performed by: INTERNAL MEDICINE

## 2022-01-21 PROCEDURE — 36415 COLL VENOUS BLD VENIPUNCTURE: CPT

## 2022-01-21 PROCEDURE — 99202 OFFICE O/P NEW SF 15 MIN: CPT | Performed by: INTERNAL MEDICINE

## 2022-01-21 PROCEDURE — 85025 COMPLETE CBC W/AUTO DIFF WBC: CPT

## 2022-01-21 PROCEDURE — 99205 OFFICE O/P NEW HI 60 MIN: CPT | Performed by: INTERNAL MEDICINE

## 2022-01-21 PROCEDURE — G8417 CALC BMI ABV UP PARAM F/U: HCPCS | Performed by: INTERNAL MEDICINE

## 2022-01-21 NOTE — PROGRESS NOTES
_               Mr. Mery Marie is a very pleasant 73 y.o. male with history of multiple co morbidities as listed. Patient is referred for further evaluation of persistent leukocytosis. The patient has longstanding history of rheumatoid arthritis and being maintained on Remicade and methotrexate for more than 10 years. He has fatigue. He has some joints problems and arthralgia. He had blood work which showed elevated white blood cells. Repeated 2 days later showed further increase in the white blood cells with increased monocytes. Patient denies any fever chills or night sweats. No weight loss will resume diet. No enlarged lymph nodes. .   No history of smoking or alcohol drinking. PAST MEDICAL HISTORY: has a past medical history of Borderline diabetic, History of colonoscopy, Hyperlipidemia, Hypertension, RA (rheumatoid arthritis) ([facility] [address] and Type 2 diabetes mellitus without complication ([facility] [address] PAST SURGICAL HISTORY: has a past surgical history that includes Tonsillectomy and adenoidectomy; Colonoscopy (N/A, 12/14/2018); and skin biopsy (01/11/2022). CURRENT MEDICATIONS:  has a current medication list which includes the following prescription(s): sitagliptin, simvastatin, triamterene-hydrochlorothiazide, potassium chloride, losartan, amlodipine, atenolol, one touch ultrasoft lancets, blood glucose test strips, onetouch delica lancets fine, vitamin d, fluticasone, folic acid, methotrexate, infliximab, valacyclovir, and loratadine. ALLERGIES:  has No Known Allergies. FAMILY HISTORY: Negative for any hematological or oncological conditions. SOCIAL HISTORY:  reports that he has never smoked. He has never used smokeless tobacco. He reports that he does not drink alcohol and does not use drugs. REVIEW OF SYSTEMS:     · General: Positive for weakness and fatigue.  No unanticipated weight loss or decreased appetite. No fever or chills. · Eyes: No blurred vision, eye pain or double vision. · Ears: No hearing problems or drainage. No tinnitus. · Throat: No sore throat, problems with swallowing or dysphagia. · Respiratory: No cough, sputum or hemoptysis. No shortness of breath. No pleuritic chest pain. · Cardiovascular: No chest pain, orthopnea or PND. No lower extremity edema. No palpitation. · Gastrointestinal: No problems with swallowing. No abdominal pain or bloating. No nausea or vomiting. No diarrhea or constipation. No GI bleeding. · Genitourinary: No dysuria, hematuria, frequency or urgency. · Musculoskeletal: As above. · Dermatologic: No skin rashes or pruritus. No skin lesions or discolorations. · Psychiatric: No depression, anxiety, or stress or signs of schizophrenia. No change in mood or affect. · Hematologic: No history of bleeding tendency. No bruises or ecchymosis. No history of clotting problems. · Infectious disease: No fever, chills or frequent infections. · Endocrine: No polydipsia or polyuria. No temperature intolerance. · Neurologic: No headaches or dizziness. No weakness or numbness of the extremities. No changes in balance, coordination,  memory, mentation, behavior. · Allergic/Immunologic: No nasal congestion or hives. No repeated infections. PHYSICAL EXAM:  The patient is not in acute distress. Vital signs: Blood pressure 105/65, pulse 69, temperature 98.6 °F (37 °C), temperature source Oral, resp. rate 16, height 5' 7\" (1.702 m), weight 160 lb (72.6 kg).      General appearance - well appearing, not in pain or distress  Mental status - good mood, alert and oriented  Eyes - pupils equal and reactive, extraocular eye movements intact  Ears - bilateral TM's and external ear canals normal  Nose - normal and patent, no erythema, discharge or polyps  Mouth - mucous membranes moist, pharynx normal without lesions  Neck - supple, no significant adenopathy  Lymphatics - no palpable lymphadenopathy, no hepatosplenomegaly  Chest - clear to auscultation, no wheezes, rales or rhonchi, symmetric air entry  Heart - normal rate, regular rhythm, normal S1, S2, no murmurs, rubs, clicks or gallops  Abdomen - soft, nontender, nondistended, no masses or organomegaly  Neurological - alert, oriented, normal speech, no focal findings or movement disorder noted  Musculoskeletal - no joint tenderness, deformity or swelling  Extremities - peripheral pulses normal, no pedal edema, no clubbing or cyanosis  Skin - normal coloration and turgor, no rashes, no suspicious skin lesions noted     Review of Diagnostic data:   Lab Results   Component Value Date    WBC 15.4 (H) 01/06/2022    HGB 13.8 01/06/2022    HCT 42.2 01/06/2022    MCV 92.7 01/06/2022     01/06/2022       Chemistry        Component Value Date/Time     01/04/2022 1010    K 4.0 01/04/2022 1010     01/04/2022 1010    CO2 24 01/04/2022 1010    BUN 37 (H) 01/04/2022 1010    CREATININE 1.35 (H) 01/04/2022 1010        Component Value Date/Time    CALCIUM 10.6 (H) 01/04/2022 1010    ALKPHOS 61 01/04/2022 1010    AST 51 (H) 01/04/2022 1010    ALT 31 01/04/2022 1010    BILITOT 0.46 01/04/2022 1010            IMPRESSION:   Persistent leukocytosis/monocytosis  Rheumatoid arthritis on Remicade and methotrexate    PLAN: I reviewed the labs available to me and discussed with the patient. For more than 60 minutes of face to face discussion, I explained to the patient the nature of this hematologic problem. I explained the significance of these abnormalities in layman language. Reviewing patient's labs evidently he had normal white blood cells for several years. White blood cells were elevated and he January. Patient states that he had no symptoms after [place] time. The monocytosis could be related to underlying viral infection.   I explained that the concerns about possible underlying blood disorder such as MDS or myeloproliferative disorders. However, reactive monocytosis is more likely. I would repeat CBC with differential today and if he still elevated I will send this for the next generation sequencing. Patient agreed. Patient's questions were answered to the best of his satisfaction and he verbalized full understanding and agreement. Addendum:  Repeat CBC today showed normal white blood cells with differential.  Mild anemia secondary to patient's chronic disease and probably methotrexate use. Hematologic testing is required. Lab Results   Component Value Date    WBC 8.3 01/21/2022    HGB 12.4 (L) 01/21/2022    HCT 36.1 (L) 01/21/2022    MCV 91.5 01/21/2022     01/21/2022    LYMPHOPCT 37 01/21/2022    RBC 3.94 (L) 01/21/2022    MCH 31.3 01/21/2022    MCHC 34.3 01/21/2022    RDW 16.2 (H) 01/21/2022                                 Floyd Schrader MD                          [facility]                            This note is created with the assistance of a speech recognition program.  While intending to generate a document that actually reflects the content of the visit, the document can still have some errors including those of syntax and sound a like substitutions which may escape proof reading. It such instances, actual meaning can be extrapolated by contextual diversion.

## 2022-01-21 NOTE — TELEPHONE ENCOUNTER
CBC stat    Pt had labs drawn after md visit, waiting for results    Electronically signed by Marco A Dick on 1/21/2022 at 11:02 AM

## 2022-03-16 DIAGNOSIS — E87.6 HYPOKALEMIA: ICD-10-CM

## 2022-03-16 DIAGNOSIS — I10 ESSENTIAL HYPERTENSION: ICD-10-CM

## 2022-03-16 RX ORDER — LOSARTAN POTASSIUM 100 MG/1
TABLET ORAL
Qty: 90 TABLET | Refills: 3 | Status: SHIPPED | OUTPATIENT
Start: 2022-03-16

## 2022-03-16 RX ORDER — TRIAMTERENE AND HYDROCHLOROTHIAZIDE 37.5; 25 MG/1; MG/1
CAPSULE ORAL
Qty: 90 CAPSULE | Refills: 3 | Status: SHIPPED | OUTPATIENT
Start: 2022-03-16

## 2022-03-16 RX ORDER — POTASSIUM CHLORIDE 750 MG/1
CAPSULE, EXTENDED RELEASE ORAL
Qty: 180 CAPSULE | Refills: 3 | Status: SHIPPED | OUTPATIENT
Start: 2022-03-16

## 2022-03-16 RX ORDER — AMLODIPINE BESYLATE 5 MG/1
TABLET ORAL
Qty: 90 TABLET | Refills: 3 | Status: SHIPPED | OUTPATIENT
Start: 2022-03-16

## 2022-03-16 RX ORDER — ATENOLOL 50 MG/1
TABLET ORAL
Qty: 135 TABLET | Refills: 3 | Status: SHIPPED | OUTPATIENT
Start: 2022-03-16

## 2022-03-17 DIAGNOSIS — E11.9 TYPE 2 DIABETES MELLITUS WITHOUT COMPLICATION, WITHOUT LONG-TERM CURRENT USE OF INSULIN (HCC): ICD-10-CM

## 2022-03-17 PROCEDURE — 3044F HG A1C LEVEL LT 7.0%: CPT | Performed by: FAMILY MEDICINE

## 2022-03-17 NOTE — TELEPHONE ENCOUNTER
Please Approve or Refuse.   [facility]      Next Visit Date:  5/17/2022   Last Visit Date: 1/6/2022    Hemoglobin A1C (%)   Date Value   01/06/2022 6.1   07/06/2021 6.0   01/05/2021 5.3             ( goal A1C is < 7)   BP Readings from Last 3 Encounters:   01/21/22 105/65   01/06/22 (!) 106/100   07/06/21 120/70          (goal 120/80)  BUN   Date Value Ref Range Status   01/04/2022 37 (H) 8 - 23 mg/dL Final     CREATININE   Date Value Ref Range Status   01/04/2022 1.35 (H) 0.70 - 1.20 mg/dL Final     Potassium   Date Value Ref Range Status   01/04/2022 4.0 3.7 - 5.3 mmol/L Final

## 2022-05-31 ENCOUNTER — OFFICE VISIT (OUTPATIENT)
Dept: FAMILY MEDICINE CLINIC | Age: 72
End: 2022-05-31
Payer: MEDICARE

## 2022-05-31 VITALS
TEMPERATURE: 97.9 F | HEIGHT: 67 IN | BODY MASS INDEX: 27.78 KG/M2 | SYSTOLIC BLOOD PRESSURE: 110 MMHG | HEART RATE: 60 BPM | WEIGHT: 177 LBS | DIASTOLIC BLOOD PRESSURE: 70 MMHG | OXYGEN SATURATION: 98 %

## 2022-05-31 DIAGNOSIS — D72.821 MONOCYTOSIS: ICD-10-CM

## 2022-05-31 DIAGNOSIS — R74.8 ELEVATED CREATINE KINASE: ICD-10-CM

## 2022-05-31 DIAGNOSIS — I10 ESSENTIAL HYPERTENSION: ICD-10-CM

## 2022-05-31 DIAGNOSIS — E78.2 MIXED HYPERLIPIDEMIA: ICD-10-CM

## 2022-05-31 DIAGNOSIS — N18.31 STAGE 3A CHRONIC KIDNEY DISEASE (HCC): ICD-10-CM

## 2022-05-31 DIAGNOSIS — M06.9 RHEUMATOID ARTHRITIS INVOLVING MULTIPLE JOINTS (HCC): ICD-10-CM

## 2022-05-31 DIAGNOSIS — E11.9 TYPE 2 DIABETES MELLITUS WITHOUT COMPLICATION, WITHOUT LONG-TERM CURRENT USE OF INSULIN (HCC): Primary | ICD-10-CM

## 2022-05-31 DIAGNOSIS — Z12.5 PROSTATE CANCER SCREENING: ICD-10-CM

## 2022-05-31 PROBLEM — N18.30 CHRONIC RENAL DISEASE, STAGE III (HCC): Status: ACTIVE | Noted: 2022-05-31

## 2022-05-31 LAB — HBA1C MFR BLD: 6 %

## 2022-05-31 PROCEDURE — 83036 HEMOGLOBIN GLYCOSYLATED A1C: CPT | Performed by: FAMILY MEDICINE

## 2022-05-31 PROCEDURE — G8427 DOCREV CUR MEDS BY ELIG CLIN: HCPCS | Performed by: FAMILY MEDICINE

## 2022-05-31 PROCEDURE — 2022F DILAT RTA XM EVC RTNOPTHY: CPT | Performed by: FAMILY MEDICINE

## 2022-05-31 PROCEDURE — 1123F ACP DISCUSS/DSCN MKR DOCD: CPT | Performed by: FAMILY MEDICINE

## 2022-05-31 PROCEDURE — 3044F HG A1C LEVEL LT 7.0%: CPT | Performed by: FAMILY MEDICINE

## 2022-05-31 PROCEDURE — 99214 OFFICE O/P EST MOD 30 MIN: CPT | Performed by: FAMILY MEDICINE

## 2022-05-31 PROCEDURE — G8417 CALC BMI ABV UP PARAM F/U: HCPCS | Performed by: FAMILY MEDICINE

## 2022-05-31 PROCEDURE — 3017F COLORECTAL CA SCREEN DOC REV: CPT | Performed by: FAMILY MEDICINE

## 2022-05-31 PROCEDURE — 1036F TOBACCO NON-USER: CPT | Performed by: FAMILY MEDICINE

## 2022-05-31 ASSESSMENT — ENCOUNTER SYMPTOMS
CHEST TIGHTNESS: 0
EYE REDNESS: 0
ABDOMINAL DISTENTION: 0
STRIDOR: 0
BLOOD IN STOOL: 0
COLOR CHANGE: 0
ABDOMINAL PAIN: 0
BACK PAIN: 1
SHORTNESS OF BREATH: 0
NAUSEA: 0
RHINORRHEA: 0
SORE THROAT: 0
TROUBLE SWALLOWING: 0
WHEEZING: 0
COUGH: 0
CONSTIPATION: 0
SINUS PRESSURE: 0
DIARRHEA: 0

## 2022-05-31 ASSESSMENT — PATIENT HEALTH QUESTIONNAIRE - PHQ9
1. LITTLE INTEREST OR PLEASURE IN DOING THINGS: 0
SUM OF ALL RESPONSES TO PHQ9 QUESTIONS 1 & 2: 0
SUM OF ALL RESPONSES TO PHQ QUESTIONS 1-9: 0
2. FEELING DOWN, DEPRESSED OR HOPELESS: 0
SUM OF ALL RESPONSES TO PHQ QUESTIONS 1-9: 0

## 2022-05-31 NOTE — PROGRESS NOTES
Visit Information    Have you changed or started any medications since your last visit including any over-the-counter medicines, vitamins, or herbal medicines? no   Are you having any side effects from any of your medications? -  no  Have you stopped taking any of your medications? Is so, why? -  no    Have you seen any other physician or provider since your last visit? No  Have you had any other diagnostic tests since your last visit? No  Have you been seen in the emergency room and/or had an admission to a hospital since we last saw you? No  Have you had your routine dental cleaning in the past 6 months? no    Have you activated your [email or web address] account? If not, what are your barriers?  Yes     Patient Care Team:  Angel Turner MD as PCP - General (Family Medicine)  Angel Turner MD as PCP - [facility]  Wicho Vuong MD as Consulting Physician (Internal Medicine)  Chelita Blood MD as Consulting Physician (Gastroenterology)    Medical History Review  Past Medical, Family, and Social History reviewed and does contribute to the patient presenting condition    Health Maintenance   Topic Date Due    Diabetic retinal exam  Never done    DTaP/Tdap/Td vaccine (1 - Tdap) Never done    Shingles vaccine (1 of 2) Never done   [employer] Visit (AWV)  07/03/2021    Colorectal Cancer Screen  12/14/2021    Diabetic microalbuminuria test  06/29/2022    Prostate Specific Antigen (PSA) Screening or Monitoring  06/29/2022    Depression Screen  07/06/2022    Lipids  01/04/2023    Diabetic foot exam  01/06/2023    A1C test (Diabetic or Prediabetic)  01/06/2023    Flu vaccine  Completed    Pneumococcal 65+ years Vaccine  Completed    COVID-19 Vaccine  Completed    Hepatitis C screen  Completed    Hepatitis A vaccine  Aged Out    Hib vaccine  Aged Out    Meningococcal (ACWY) vaccine  Aged Out Stable

## 2022-05-31 NOTE — PROGRESS NOTES
will send this for the next generation sequencing. Patient agreed. Patient's questions were answered to the best of his satisfaction and he verbalized full understanding and agreement.        Addendum:  Repeat CBC today showed normal white blood cells with differential.  Mild anemia secondary to patient's chronic disease and probably methotrexate use. Hematologic testing is required. /70   Pulse 60   Temp 97.9 °F (36.6 °C)   Ht 5' 7\" (1.702 m)   Wt 177 lb (80.3 kg)   SpO2 98%   BMI 27.72 kg/m²    Body mass index is 27.72 kg/m². Wt Readings from Last 3 Encounters:   05/31/22 177 lb (80.3 kg)   01/21/22 160 lb (72.6 kg)   01/06/22 160 lb (72.6 kg)        [x]Negative depression screening. PHQ Scores 5/31/2022 7/6/2021 1/5/2021 7/2/2020 2/14/2019 11/14/2018 4/5/2018   PHQ2 Score 0 0 0 0 0 - 0   PHQ9 Score 0 0 0 0 0 0 0      []1-4 = Minimal depression   []5-9 = Milddepression   []10-14 = Moderate depression   []15-19 = Moderately severe depression   []20-27 = Severe depression    Discussed testing with the patient and all questions fully answered.     Hospital Outpatient Visit on 01/21/2022   Component Date Value Ref Range Status    WBC 01/21/2022 8.3  3.5 - 11.0 k/uL Final    RBC 01/21/2022 3.94* 4.5 - 5.9 m/uL Final    Hemoglobin 01/21/2022 12.4* 13.5 - 17.5 g/dL Final    Hematocrit 01/21/2022 36.1* 41 - 53 % Final    MCV 01/21/2022 91.5  80 - 100 fL Final    MCH 01/21/2022 31.3  26 - 34 pg Final    MCHC 01/21/2022 34.3  31 - 37 g/dL Final    RDW 01/21/2022 16.2* 12.5 - 15.4 % Final    Platelets 96/79/1139 149  140 - 450 k/uL Final    MPV 01/21/2022 9.2  6.0 - 12.0 fL Final    NRBC Automated 01/21/2022 NOT REPORTED  per 100 WBC Final    Differential Type 01/21/2022 NOT REPORTED   Final    Seg Neutrophils 01/21/2022 53  36 - 66 % Final    Lymphocytes 01/21/2022 37  24 - 44 % Final    Monocytes 01/21/2022 7  2 - 11 % Final    Eosinophils % 01/21/2022 2  1 - 4 % Final    Basophils 01/21/2022 1  0 - 2 % Final    Immature Granulocytes 01/21/2022 NOT REPORTED  0 % Final    Segs Absolute 01/21/2022 4.40  1.8 - 7.7 k/uL Final    Absolute Lymph # 01/21/2022 3.10  1.0 - 4.8 k/uL Final    Absolute Mono # 01/21/2022 0.60  0.1 - 1.2 k/uL Final    Absolute Eos # 01/21/2022 0.20  0.0 - 0.4 k/uL Final    Basophils Absolute 01/21/2022 0.10  0.0 - 0.2 k/uL Final    Absolute Immature Granulocyte 01/21/2022 NOT REPORTED  0.00 - 0.30 k/uL Final    WBC Morphology 01/21/2022 NOT REPORTED   Final    RBC Morphology 01/21/2022 NOT REPORTED   Final    Platelet Estimate 01/87/2192 NOT REPORTED   Final         Most recent labs reviewed:     Lab Results   Component Value Date    WBC 8.3 01/21/2022    HGB 12.4 (L) 01/21/2022    HCT 36.1 (L) 01/21/2022    MCV 91.5 01/21/2022     01/21/2022       @BRIEFLAB(NA,K,CL,CO2,BUN,CREATININE,GLUCOSE,CALCIUM)@     Lab Results   Component Value Date    ALT 31 01/04/2022    AST 51 (H) 01/04/2022    ALKPHOS 61 01/04/2022    BILITOT 0.46 01/04/2022       Lab Results   Component Value Date    TSH 4.18 01/04/2022       Lab Results   Component Value Date    CHOL 120 01/04/2022    CHOL 137 07/06/2020    CHOL 135 05/29/2019     Lab Results   Component Value Date    TRIG 160 (H) 01/04/2022    TRIG 134 07/06/2020    TRIG 115 05/29/2019     Lab Results   Component Value Date    HDL 20 (L) 01/04/2022    HDL 34 (L) 07/06/2020    HDL 35 (L) 05/29/2019     Lab Results   Component Value Date    LDLCHOLESTEROL 68 01/04/2022    LDLCHOLESTEROL 76 07/06/2020    LDLCHOLESTEROL 77 05/29/2019     Lab Results   Component Value Date    VLDL NOT REPORTED (H) 01/04/2022    VLDL NOT REPORTED 07/06/2020    VLDL NOT REPORTED 05/29/2019     Lab Results   Component Value Date    CHOLHDLRATIO 6.0 (H) 01/04/2022    CHOLHDLRATIO 4.0 07/06/2020    CHOLHDLRATIO 3.9 05/29/2019       Lab Results   Component Value Date    LABA1C 6.0 05/31/2022       Lab Results   Component Value Date    BKXVKJDV58 387 05/30/2019       Lab Results   Component Value Date    FOLATE >20.0 05/30/2019       Lab Results   Component Value Date    IRON 68 05/30/2019    TIBC 276 05/30/2019    FERRITIN 280 05/30/2019       Lab Results   Component Value Date    VITD25 51.1 06/29/2021             Current Outpatient Medications   Medication Sig Dispense Refill    SITagliptin (JANUVIA) 50 MG tablet Take 1 tablet by mouth daily 90 tablet 5    potassium chloride (MICRO-K) 10 MEQ extended release capsule TAKE 1 CAPSULE TWICE A  capsule 3    losartan (COZAAR) 100 MG tablet TAKE 1 TABLET DAILY 90 tablet 3    atenolol (TENORMIN) 50 MG tablet TAKE ONE AND ONE-HALF TABLETS DAILY 135 tablet 3    triamterene-hydroCHLOROthiazide (DYAZIDE) 37.5-25 MG per capsule TAKE 1 CAPSULE DAILY 90 capsule 3    amLODIPine (NORVASC) 5 MG tablet TAKE 1 TABLET DAILY 90 tablet 3    simvastatin (ZOCOR) 10 MG tablet TAKE 1 TABLET NIGHTLY 90 tablet 3    ONE TOUCH ULTRASOFT LANCETS MISC 1 each by Does not apply route daily 100 each 3    blood glucose monitor strips Test blood sugar once daily 100 strip 3    ONETOUCH DELICA LANCETS FINE MISC DX:E11.65 PATIENT TO TEST 2-3 TIMES DAILY  11    vitamin D (CHOLECALCIFEROL) 1000 UNIT TABS tablet Take 1,000 Units by mouth daily      folic acid (FOLVITE) 1 MG tablet Take 1 mg by mouth daily      methotrexate (RHEUMATREX) 2.5 MG chemo tablet Take 2.5 mg by mouth once a week 10 tabs weekly      inFLIXimab (REMICADE) 100 MG injection Infuse 5 mg/kg intravenously See Admin Instructions Every 8 weeks      fluticasone (FLONASE) 50 MCG/ACT nasal spray 1 spray by Nasal route daily (Patient not taking: Reported on 5/31/2022)      loratadine (CLARITIN) 10 MG tablet Take 10 mg by mouth daily as needed  (Patient not taking: Reported on 5/31/2022)       No current facility-administered medications for this visit.              Social History     Socioeconomic History    Marital status:      Spouse name: Not on file    Number of children: Not on file    Years of education: Not on file    Highest education level: Not on file   Occupational History    Not on file   Tobacco Use    Smoking status: Never Smoker    Smokeless tobacco: Never Used   Vaping Use    Vaping Use: Never used   Substance and Sexual Activity    Alcohol use: No     Alcohol/week: 0.0 standard drinks    Drug use: No    Sexual activity: Yes     Partners: Female   Other Topics Concern    Not on file   Social History Narrative    Not on file     Social Determinants of Health     Financial Resource Strain: Low Risk     Difficulty of Paying Living Expenses: Not very hard   Food Insecurity: No Food Insecurity    Worried About Running Out of Food in the Last Year: Never true    Rosa of Food in the Last Year: Never true   Transportation Needs:     Lack of Transportation (Medical): Not on file    Lack of Transportation (Non-Medical):  Not on file   Physical Activity:     Days of Exercise per Week: Not on file    Minutes of Exercise per Session: Not on file   Stress:     Feeling of Stress : Not on file   Social Connections:     Frequency of Communication with Friends and Family: Not on file    Frequency of Social Gatherings with Friends and Family: Not on file    Attends Sabianist Services: Not on file    Active Member of [address] [place] [address] Palmer Hargreaves or Organizations: Not on file    Attends Club or Organization Meetings: Not on file    Marital Status: Not on file   Intimate Partner Violence:     Fear of Current or Ex-Partner: Not on file    Emotionally Abused: Not on file    Physically Abused: Not on file    Sexually Abused: Not on file   Housing Stability:     Unable to Pay for Housing in the Last Year: Not on file    Number of [place] in the Last Year: Not on file    Unstable Housing in the Last Year: Not on file     Counseling given: Not Answered        Family History   Problem Relation Age of Onset    Other Mother         mild dysplasia    Cancer Father throat, lung             -rest of complaints with corresponding details per ROS    The patient's past medical, surgical, social, and family history as well as his current medications and allergies were reviewed as documented intoday's encounter. Review of Systems   Constitutional: Positive for unexpected weight change. Negative for activity change, appetite change, fatigue and fever. HENT: Negative for congestion, ear pain, postnasal drip, rhinorrhea, sinus pressure, sore throat and trouble swallowing. Eyes: Negative for redness and visual disturbance. Respiratory: Negative for cough, chest tightness, shortness of breath, wheezing and stridor. Cardiovascular: Negative for chest pain, palpitations and leg swelling. Gastrointestinal: Negative for abdominal distention, abdominal pain, blood in stool, constipation, diarrhea and nausea. Endocrine: Negative for polydipsia, polyphagia and polyuria. Genitourinary: Negative for difficulty urinating, flank pain, frequency and urgency. Musculoskeletal: Positive for arthralgias, back pain, gait problem, myalgias and neck pain. Negative for neck stiffness. Skin: Negative for color change, rash and wound. Allergic/Immunologic: Positive for immunocompromised state. Negative for food allergies. Neurological: Positive for weakness and numbness. Negative for dizziness, speech difficulty, light-headedness and headaches. Psychiatric/Behavioral: Negative for agitation, behavioral problems, decreased concentration, dysphoric mood, hallucinations, sleep disturbance and suicidal ideas. The patient is nervous/anxious. Physical Exam  Vitals and nursing note reviewed. Constitutional:       General: He is not in acute distress. Appearance: Normal appearance. He is well-developed. He is not diaphoretic. HENT:      Head: Normocephalic and atraumatic. Nose: Nose normal.   Eyes:      General:         Right eye: No discharge. Left eye: No discharge. Extraocular Movements: Extraocular movements intact. Conjunctiva/sclera: Conjunctivae normal.      Pupils: Pupils are equal, round, and reactive to light. Neck:      Thyroid: No thyromegaly. Cardiovascular:      Rate and Rhythm: Normal rate and regular rhythm. Heart sounds: Normal heart sounds. No murmur heard. Pulmonary:      Effort: Pulmonary effort is normal. No respiratory distress. Breath sounds: Normal breath sounds. No wheezing or rhonchi. Abdominal:      General: Bowel sounds are normal. There is no distension. Palpations: Abdomen is soft. There is no mass. Tenderness: There is no abdominal tenderness. There is no right CVA tenderness, left CVA tenderness, guarding or rebound. Musculoskeletal:         General: No tenderness. Cervical back: Neck supple. Spasms present. No rigidity. Decreased range of motion. Thoracic back: Spasms present. Normal range of motion. Lumbar back: Deformity and spasms present. Decreased range of motion. Right lower leg: No edema. Left lower leg: No edema. Lymphadenopathy:      Cervical: No cervical adenopathy. Skin:     Coloration: Skin is not jaundiced or pale. Findings: No bruising, erythema or rash. Neurological:      General: No focal deficit present. Mental Status: He is alert and oriented to person, place, and time. Cranial Nerves: No cranial nerve deficit. Sensory: Sensory deficit present. Motor: No weakness or tremor. Coordination: Coordination normal.      Gait: Gait and tandem walk normal.      Deep Tendon Reflexes: Reflexes are normal and symmetric. Psychiatric:         Attention and Perception: Attention and perception normal. He is attentive. Mood and Affect: Mood is anxious. Mood is not depressed. Affect is not tearful. Speech: He is communicative. Speech is not rapid and pressured, delayed or slurred.          Behavior: Behavior normal. Behavior is not agitated or slowed. Thought Content: Thought content normal.         Judgment: Judgment normal.             ASSESSMENT AND PLAN      1. Type 2 diabetes mellitus without complication, without long-term current use of insulin (HCC)  Controlled, patient is doing well, will continue Januvia, monitor blood sugars.  - POCT glycosylated hemoglobin (Hb A1C)    2. Mixed hyperlipidemia  Stable continue statins. Discussed test results    3. Essential hypertension  Controlled continue amlodipine, atenolol and losartan. Monitor blood pressure at home    4. Stage 3a chronic kidney disease (HCC)  Stable repeat BMP avoid NSAIDs keep yourself hydrated    5. Rheumatoid arthritis involving multiple joints (HCC)  Pain is fairly stable continue to follow with rheumatologist    6. Elevated creatine kinase  Repeat BMP  - Basic Metabolic Panel; Future    7. Monocytosis  Seen by oncologist.  Blood work is normal    8. Prostate cancer screening    - PSA Screening; Future      Orders Placed This Encounter   Procedures    PSA Screening     Standing Status:   Future     Standing Expiration Date:   5/31/2023    Basic Metabolic Panel     Standing Status:   Future     Standing Expiration Date:   5/31/2023    POCT glycosylated hemoglobin (Hb A1C)         Medications Discontinued During This Encounter   Medication Reason    valACYclovir (VALTREX) 500 MG tablet Therapy completed       Benjamín Martinez received counseling on the following healthy behaviors: nutrition, exercise and medication adherence  Reviewed prior labs and health maintenance  Continue current medications, diet and exercise. Discussed use, benefit, and side effects of prescribed medications. Barriers to medication compliance addressed. Patient given educational materials - see patient instructions  Was a self-tracking handout given in paper form or via [employer]?  Yes    Requested Prescriptions      No prescriptions requested or ordered in this encounter       All patient questions answered. Patient voiced understanding. Quality Measures    Body mass index is 27.72 kg/m². Elevated. Weight control planned discussed daily exercise regimen and Healthy diet and regular exercise. BP: 110/70. Blood pressure is normal. Treatment plan consists of Weight Reduction and No treatment change needed. Fall Risk 7/6/2021 7/2/2020 2/14/2019 4/5/2018 4/5/2017   2 or more falls in past year? no no no no no   Fall with injury in past year? no no no no no     The patient does not have a history of falls. I did , complete a risk assessment for falls. A plan of care for falls in-office gait and balance testing performed using The Timed Up and Go Test was negative for increased falls risk- no further intervention is currently indicated, home safety tips provided, No Treatment plan indicated    Lab Results   Component Value Date    LDLCHOLESTEROL 68 01/04/2022    (goal LDL reduction with dx if diabetes is 50% LDL reduction)    PHQ Scores 5/31/2022 7/6/2021 1/5/2021 7/2/2020 2/14/2019 11/14/2018 4/5/2018   PHQ2 Score 0 0 0 0 0 - 0   PHQ9 Score 0 0 0 0 0 0 0     Interpretation of Total Score Depression Severity: 1-4 = Minimal depression, 5-9 = Mild depression, 10-14 = Moderate depression, 15-19 = Moderately severe depression, 20-27 = Severe depression      The patient'spast medical, surgical, social, and family history as well as his   current medications and allergies were reviewed as documented in today's encounter. Medications, labs, diagnostic studies, consultations andfollow-up as documented in this encounter. Return in about 3 months (around 8/31/2022) for dm ,htn, hld. Patient wasseen with total face to face time of 30 minutes. More than 50% of this visit was counseling and education.        Future Appointments   Date Time Provider Papito Simms   8/31/2022 12:15 PM [facility]     This note was completed by using the assistance of a speech-recognition program. However, inadvertent computerized transcription errors may be present. Althoughevery effort was made to ensure accuracy, no guarantees can be provided that every mistake has been identified and corrected by editing.   Electronically signed by Villa Leo MD on 5/31/2022  1:24 PM

## 2022-08-25 ENCOUNTER — HOSPITAL ENCOUNTER (OUTPATIENT)
Age: 72
Setting detail: SPECIMEN
Discharge: HOME | End: 2022-08-25

## 2022-08-25 DIAGNOSIS — R74.8 ELEVATED CREATINE KINASE: ICD-10-CM

## 2022-08-25 DIAGNOSIS — Z12.5 PROSTATE CANCER SCREENING: ICD-10-CM

## 2022-08-25 LAB
ANION GAP SERPL CALCULATED.3IONS-SCNC: 10 MMOL/L (ref 9–17)
BUN BLDV-MCNC: 28 MG/DL (ref 8–23)
CALCIUM SERPL-MCNC: 9.4 MG/DL (ref 8.6–10.4)
CHLORIDE BLD-SCNC: 100 MMOL/L (ref 98–107)
CO2: 28 MMOL/L (ref 20–31)
CREAT SERPL-MCNC: 1.19 MG/DL (ref 0.7–1.2)
GFR AFRICAN AMERICAN: >60 ML/MIN
GFR NON-AFRICAN AMERICAN: >60 ML/MIN
GFR SERPL CREATININE-BSD FRML MDRD: ABNORMAL ML/MIN/{1.73_M2}
GLUCOSE BLD-MCNC: 135 MG/DL (ref 70–99)
POTASSIUM SERPL-SCNC: 4 MMOL/L (ref 3.7–5.3)
PROSTATE SPECIFIC ANTIGEN: 2.45 NG/ML
SODIUM BLD-SCNC: 138 MMOL/L (ref 135–144)

## 2022-08-30 SDOH — HEALTH STABILITY: PHYSICAL HEALTH: ON AVERAGE, HOW MANY DAYS PER WEEK DO YOU ENGAGE IN MODERATE TO STRENUOUS EXERCISE (LIKE A BRISK WALK)?: 2 DAYS

## 2022-08-30 SDOH — HEALTH STABILITY: PHYSICAL HEALTH: ON AVERAGE, HOW MANY MINUTES DO YOU ENGAGE IN EXERCISE AT THIS LEVEL?: 20 MIN

## 2022-08-30 ASSESSMENT — PATIENT HEALTH QUESTIONNAIRE - PHQ9
SUM OF ALL RESPONSES TO PHQ9 QUESTIONS 1 & 2: 0
SUM OF ALL RESPONSES TO PHQ QUESTIONS 1-9: 0
SUM OF ALL RESPONSES TO PHQ QUESTIONS 1-9: 0
1. LITTLE INTEREST OR PLEASURE IN DOING THINGS: 0
2. FEELING DOWN, DEPRESSED OR HOPELESS: 0
SUM OF ALL RESPONSES TO PHQ QUESTIONS 1-9: 0
SUM OF ALL RESPONSES TO PHQ QUESTIONS 1-9: 0

## 2022-08-30 ASSESSMENT — LIFESTYLE VARIABLES
HOW OFTEN DO YOU HAVE SIX OR MORE DRINKS ON ONE OCCASION: 1
HOW MANY STANDARD DRINKS CONTAINING ALCOHOL DO YOU HAVE ON A TYPICAL DAY: PATIENT DOES NOT DRINK
HOW OFTEN DO YOU HAVE A DRINK CONTAINING ALCOHOL: 1
HOW MANY STANDARD DRINKS CONTAINING ALCOHOL DO YOU HAVE ON A TYPICAL DAY: 0
HOW OFTEN DO YOU HAVE A DRINK CONTAINING ALCOHOL: NEVER

## 2022-08-31 ENCOUNTER — OFFICE VISIT (OUTPATIENT)
Dept: FAMILY MEDICINE CLINIC | Age: 72
End: 2022-08-31
Payer: MEDICARE

## 2022-08-31 VITALS
SYSTOLIC BLOOD PRESSURE: 118 MMHG | DIASTOLIC BLOOD PRESSURE: 82 MMHG | WEIGHT: 190 LBS | TEMPERATURE: 97.9 F | OXYGEN SATURATION: 98 % | HEART RATE: 64 BPM | HEIGHT: 67 IN | BODY MASS INDEX: 29.82 KG/M2

## 2022-08-31 DIAGNOSIS — Z00.00 MEDICARE ANNUAL WELLNESS VISIT, SUBSEQUENT: Primary | ICD-10-CM

## 2022-08-31 DIAGNOSIS — E10.22 CKD STAGE 2 DUE TO TYPE 1 DIABETES MELLITUS (HCC): ICD-10-CM

## 2022-08-31 DIAGNOSIS — N18.2 CKD STAGE 2 DUE TO TYPE 1 DIABETES MELLITUS (HCC): ICD-10-CM

## 2022-08-31 DIAGNOSIS — Z13.6 SCREENING FOR CARDIOVASCULAR CONDITION: ICD-10-CM

## 2022-08-31 DIAGNOSIS — E11.9 TYPE 2 DIABETES MELLITUS WITHOUT COMPLICATION, WITHOUT LONG-TERM CURRENT USE OF INSULIN (HCC): ICD-10-CM

## 2022-08-31 LAB — HBA1C MFR BLD: 6.2 %

## 2022-08-31 PROCEDURE — 3017F COLORECTAL CA SCREEN DOC REV: CPT | Performed by: FAMILY MEDICINE

## 2022-08-31 PROCEDURE — 83036 HEMOGLOBIN GLYCOSYLATED A1C: CPT | Performed by: FAMILY MEDICINE

## 2022-08-31 PROCEDURE — G0446 INTENS BEHAVE THER CARDIO DX: HCPCS | Performed by: FAMILY MEDICINE

## 2022-08-31 PROCEDURE — 1123F ACP DISCUSS/DSCN MKR DOCD: CPT | Performed by: FAMILY MEDICINE

## 2022-08-31 PROCEDURE — 3044F HG A1C LEVEL LT 7.0%: CPT | Performed by: FAMILY MEDICINE

## 2022-08-31 PROCEDURE — G0439 PPPS, SUBSEQ VISIT: HCPCS | Performed by: FAMILY MEDICINE

## 2022-08-31 ASSESSMENT — VISUAL ACUITY
OS_CC: 20/15
OD_CC: 20/25

## 2022-08-31 NOTE — PROGRESS NOTES
Visit Information    Have you changed or started any medications since your last visit including any over-the-counter medicines, vitamins, or herbal medicines? no   Have you stopped taking any of your medications? Is so, why? -  no  Are you having any side effects from any of your medications? - no    Have you seen any other physician or provider since your last visit? yes - dermatologist   Have you had any other diagnostic tests since your last visit?  no   Have you been seen in the emergency room and/or had an admission in a hospital since we last saw you?  no   Have you had your routine dental cleaning in the past 6 months?  yes -      Do you have an active MyChart account? If no, what is the barrier?   Yes    Patient Care Team:  Niecy Armstrong MD as PCP - General (Family Medicine)  Niecy Armstrong MD as PCP - [facility] Provider  Brandy Rae MD as Consulting Physician (Internal Medicine)  Kaylan Allen MD as Consulting Physician (Gastroenterology)    Medical History Review  Past Medical, Family, and Social History reviewed and does contribute to the patient presenting condition    Health Maintenance   Topic Date Due    DTaP/Tdap/Td vaccine (1 - Tdap) Never done    Shingles vaccine (1 of 2) Never done    Annual Wellness Visit (AWV)  07/03/2021    Diabetic retinal exam  09/28/2021    Colorectal Cancer Screen  12/14/2021    Flu vaccine (1) 09/01/2022    Lipids  01/04/2023    Diabetic foot exam  01/06/2023    A1C test (Diabetic or Prediabetic)  05/31/2023    Depression Screen  08/30/2023    Pneumococcal 65+ years Vaccine  Completed    COVID-19 Vaccine  Completed    Hepatitis C screen  Completed    Hepatitis A vaccine  Aged Out    Hib vaccine  Aged Out    Meningococcal (ACWY) vaccine  Aged Out

## 2022-08-31 NOTE — PROGRESS NOTES
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  Recommended screening schedule for the next 5-10 years is provided to the patient in written form: see Patient Instructions/AVS.     Return in 4 months (on 12/31/2022) for Medicare Annual Wellness Visit in 1 year, dm ,htn, hld. Subjective   The following acute and/or chronic problems were also addressed today:    Diabetes controlled on Januvia A1c stable 6.2. Patient's complete Health Risk Assessment and screening values have been reviewed and are found in Flowsheets. The following problems were reviewed today and where indicated follow up appointments were made and/or referrals ordered.     Positive Risk Factor Screenings with Interventions:             General Health and ACP:  General  In general, how would you say your health is?: Good  In the past 7 days, have you experienced any of the following: New or Increased Pain, New or Increased Fatigue, Loneliness, Social Isolation, Stress or Anger?: No  Do you get the social and emotional support that you need?: Yes  Do you have a Living Will?: Yes    Advance Directives       Power of  Living Will ACP-Advance Directive ACP-Power of     Not on File Not on File Not on File Not on File        General Health Risk Interventions:  No Living Will: ACP documents already completed- patient asked to provide copy to the office     Hearing/Vision:  Do you or your family notice any trouble with your hearing that hasn't been managed with hearing aids?: No  Do you have difficulty driving, watching TV, or doing any of your daily activities because of your eyesight?: No  Have you had an eye exam within the past year?: Appointment is scheduled  Vision Screening    Right eye Left eye Both eyes   Without correction      With correction 20/25 20/15 20/15     Hearing/Vision Interventions:  Vision concerns:  patient encouraged to make appointment with his/her eye specialist    Safety:  Do you have working smoke detectors?: Yes  Do you have any tripping hazards - loose or unsecured carpets or rugs?: No  Do you have any tripping hazards - clutter in doorways, halls, or stairs?: No  Do you have either shower bars, grab bars, non-slip mats or non-slip surfaces in your shower or bathtub?: (!) No  Do all of your stairways have a railing or banister?: Not Applicable  Do you always fasten your seatbelt when you are in a car?: Yes  Safety Interventions:  Home safety tips provided           Objective   Vitals:    08/31/22 1151   BP: 118/82   Pulse: 64   Temp: 97.9 °F (36.6 °C)   TempSrc: Temporal   SpO2: 98%   Weight: 190 lb (86.2 kg)   Height: 5' 7\" (1.702 m)      Body mass index is 29.76 kg/m². No Known Allergies  Prior to Visit Medications    Medication Sig Taking?  Authorizing Provider   SITagliptin (JANUVIA) 50 MG tablet Take 1 tablet by mouth daily Yes Liv Rondon MD   potassium chloride (MICRO-K) 10 MEQ extended release capsule TAKE 1 CAPSULE TWICE A DAY Yes Liv Rondon MD   losartan (COZAAR) 100 MG tablet TAKE 1 TABLET DAILY Yes Liv Rondon MD   atenolol (TENORMIN) 50 MG tablet TAKE ONE AND ONE-HALF TABLETS DAILY Yes Liv Rondon MD   triamterene-hydroCHLOROthiazide (DYAZIDE) 37.5-25 MG per capsule TAKE 1 CAPSULE DAILY Yes Liv Rondon MD   amLODIPine (NORVASC) 5 MG tablet TAKE 1 TABLET DAILY Yes Liv Rondon MD   simvastatin (ZOCOR) 10 MG tablet TAKE 1 TABLET NIGHTLY Yes Liv Rondon MD   ONE TOUCH ULTRASOFT LANCETS MISC 1 each by Does not apply route daily Yes Amy Palm MD   blood glucose monitor strips Test blood sugar once daily Yes Amy Palm MD   [facility] DELICA LANCETS FINE [address] [facility] DX:E11.65 PATIENT TO TEST 2-3 TIMES DAILY Yes Historical Provider, MD   vitamin D (CHOLECALCIFEROL) 1000 UNIT TABS tablet Take 1,000 Units by mouth daily Yes Historical Provider, MD   fluticasone (FLONASE) 50 MCG/ACT nasal spray 1 spray by Nasal route daily Yes Historical Provider, MD   loratadine (CLARITIN) 10 MG tablet Take 10 mg by mouth daily as needed Yes Historical Provider, MD   folic acid (FOLVITE) 1 MG tablet Take 1 mg by mouth daily Yes Historical Provider, MD   methotrexate (RHEUMATREX) 2.5 MG chemo tablet Take 2.5 mg by mouth once a week 10 tabs weekly Yes Historical Provider, MD   inFLIXimab (REMICADE) 100 MG injection Infuse 5 mg/kg intravenously See Admin Instructions Every 8 weeks Yes Historical Provider, MD Cabrera (Including outside providers/suppliers regularly involved in providing care):   Patient Care Team:  Amy Palm MD as PCP - General (Family Medicine)  Amy Palm MD as PCP - [facility] Empaneled Provider  Radha Escobedo MD as Consulting Physician (Internal Medicine)  Leonid Joel MD as Consulting Physician (Gastroenterology)     Reviewed and updated this visit:  Tobacco  Allergies  Meds  Problems  Med Hx  Surg Hx  Soc Hx  Fam Hx               Cardiovascular Disease Risk Counseling: Assessed the patient's risk to develop cardiovascular disease and reviewed main risk factors.    Reviewed steps to reduce disease risk including:   Quitting tobacco use, reducing amount smoked, or not starting the habit  Making healthy food choices  Being physically active and gradualy increasing activity levels   Reduce weight and determine a healthy BMI goal  Monitor blood pressure and treat if higher than 140/90 mmHg  Maintain blood total cholesterol levels under 5 mmol/l or 190 mg/dl  Maintain LDL cholesterol levels under 3.0 mmol/l or 115 mg/dl   Control blood glucose levels  Consider taking aspirin (75 mg daily), once blood pressure is controlled   Provided a follow up plan.   Time spent (minutes): 10

## 2022-08-31 NOTE — PATIENT INSTRUCTIONS
Personalized Preventive Plan for Adebayo Castro - 8/31/2022  Medicare offers a range of preventive health benefits. Some of the tests and screenings are paid in full while other may be subject to a deductible, co-insurance, and/or copay. Some of these benefits include a comprehensive review of your medical history including lifestyle, illnesses that may run in your family, and various assessments and screenings as appropriate. After reviewing your medical record and screening and assessments performed today your provider may have ordered immunizations, labs, imaging, and/or referrals for you. A list of these orders (if applicable) as well as your Preventive Care list are included within your After Visit Summary for your review. Other Preventive Recommendations:    A preventive eye exam performed by an eye specialist is recommended every 1-2 years to screen for glaucoma; cataracts, macular degeneration, and other eye disorders. A preventive dental visit is recommended every 6 months. Try to get at least 150 minutes of exercise per week or 10,000 steps per day on a pedometer . Order or download the FREE \"Exercise & Physical Activity: Your Everyday Guide\" from [employer]. Call 5-957.766.4901 or search [employer] online. You need 2104-6100 mg of calcium and 2483-1564 IU of vitamin D per day. It is possible to meet your calcium requirement with diet alone, but a vitamin D supplement is usually necessary to meet this goal.  When exposed to the sun, use a sunscreen that protects against both UVA and UVB radiation with an SPF of 30 or greater. Reapply every 2 to 3 hours or after sweating, drying off with a towel, or swimming. Always wear a seat belt when traveling in a car. Always wear a helmet when riding a bicycle or motorcycle.

## 2022-10-11 DIAGNOSIS — E78.2 MIXED HYPERLIPIDEMIA: ICD-10-CM

## 2022-10-12 RX ORDER — SIMVASTATIN 10 MG
TABLET ORAL
Qty: 90 TABLET | Refills: 3 | Status: SHIPPED | OUTPATIENT
Start: 2022-10-12

## 2022-10-12 NOTE — TELEPHONE ENCOUNTER
Please Approve or Refuse.   Send to Pharmacy per Pt's Request:      Next Visit Date:  1/5/2023   Last Visit Date: 8/31/2022    Hemoglobin A1C (%)   Date Value   08/31/2022 6.2   05/31/2022 6.0   01/06/2022 6.1             ( goal A1C is < 7)   BP Readings from Last 3 Encounters:   08/31/22 118/82   05/31/22 110/70   01/21/22 105/65          (goal 120/80)  BUN   Date Value Ref Range Status   08/25/2022 28 (H) 8 - 23 mg/dL Final     Creatinine   Date Value Ref Range Status   08/25/2022 1.19 0.70 - 1.20 mg/dL Final     Potassium   Date Value Ref Range Status   08/25/2022 4.0 3.7 - 5.3 mmol/L Final

## 2023-01-05 ENCOUNTER — OFFICE VISIT (OUTPATIENT)
Dept: FAMILY MEDICINE CLINIC | Age: 73
End: 2023-01-05

## 2023-01-05 VITALS
BODY MASS INDEX: 29.55 KG/M2 | TEMPERATURE: 97.4 F | SYSTOLIC BLOOD PRESSURE: 110 MMHG | DIASTOLIC BLOOD PRESSURE: 80 MMHG | WEIGHT: 195 LBS | OXYGEN SATURATION: 96 % | HEIGHT: 68 IN | HEART RATE: 67 BPM

## 2023-01-05 DIAGNOSIS — E78.2 MIXED HYPERLIPIDEMIA: ICD-10-CM

## 2023-01-05 DIAGNOSIS — E11.9 TYPE 2 DIABETES MELLITUS WITHOUT COMPLICATION, WITHOUT LONG-TERM CURRENT USE OF INSULIN (HCC): Primary | ICD-10-CM

## 2023-01-05 DIAGNOSIS — M06.9 RHEUMATOID ARTHRITIS INVOLVING MULTIPLE JOINTS (HCC): ICD-10-CM

## 2023-01-05 DIAGNOSIS — D64.9 ANEMIA, UNSPECIFIED TYPE: ICD-10-CM

## 2023-01-05 DIAGNOSIS — D36.9 ADENOMATOUS POLYP: ICD-10-CM

## 2023-01-05 DIAGNOSIS — E55.9 VITAMIN D DEFICIENCY: ICD-10-CM

## 2023-01-05 DIAGNOSIS — N18.31 STAGE 3A CHRONIC KIDNEY DISEASE (HCC): ICD-10-CM

## 2023-01-05 LAB — HBA1C MFR BLD: 6.5 %

## 2023-01-05 RX ORDER — GLUCOSAMINE HCL/CHONDROITIN SU 500-400 MG
CAPSULE ORAL
Qty: 100 STRIP | Refills: 3 | Status: SHIPPED | OUTPATIENT
Start: 2023-01-05

## 2023-01-05 SDOH — ECONOMIC STABILITY: FOOD INSECURITY: WITHIN THE PAST 12 MONTHS, YOU WORRIED THAT YOUR FOOD WOULD RUN OUT BEFORE YOU GOT MONEY TO BUY MORE.: NEVER TRUE

## 2023-01-05 SDOH — ECONOMIC STABILITY: FOOD INSECURITY: WITHIN THE PAST 12 MONTHS, THE FOOD YOU BOUGHT JUST DIDN'T LAST AND YOU DIDN'T HAVE MONEY TO GET MORE.: NEVER TRUE

## 2023-01-05 ASSESSMENT — ENCOUNTER SYMPTOMS
TROUBLE SWALLOWING: 0
RECTAL PAIN: 0
ABDOMINAL PAIN: 0
WHEEZING: 0
SINUS PRESSURE: 0
CHEST TIGHTNESS: 0
SHORTNESS OF BREATH: 0
BLOOD IN STOOL: 0
SORE THROAT: 0
VOMITING: 0
DIARRHEA: 0
CONSTIPATION: 0
COUGH: 0
NAUSEA: 0
RHINORRHEA: 0
ABDOMINAL DISTENTION: 0
STRIDOR: 0
BACK PAIN: 1
COLOR CHANGE: 0
EYE REDNESS: 0

## 2023-01-05 ASSESSMENT — PATIENT HEALTH QUESTIONNAIRE - PHQ9
2. FEELING DOWN, DEPRESSED OR HOPELESS: 0
SUM OF ALL RESPONSES TO PHQ9 QUESTIONS 1 & 2: 0
SUM OF ALL RESPONSES TO PHQ QUESTIONS 1-9: 0
1. LITTLE INTEREST OR PLEASURE IN DOING THINGS: 0

## 2023-01-05 ASSESSMENT — SOCIAL DETERMINANTS OF HEALTH (SDOH): HOW HARD IS IT FOR YOU TO PAY FOR THE VERY BASICS LIKE FOOD, HOUSING, MEDICAL CARE, AND HEATING?: NOT HARD AT ALL

## 2023-01-05 NOTE — PROGRESS NOTES
Visit Information    Have you changed or started any medications since your last visit including any over-the-counter medicines, vitamins, or herbal medicines? no   Have you stopped taking any of your medications? Is so, why? -  no  Are you having any side effects from any of your medications? - no    Have you seen any other physician or provider since your last visit? yes -    Have you had any other diagnostic tests since your last visit? yes -    Have you been seen in the emergency room and/or had an admission in a hospital since we last saw you?  no   Have you had your routine dental cleaning in the past 6 months?  no     Do you have an active MyChart account? If no, what is the barrier?   Yes    Patient Care Team:  Amy Palm MD as PCP - General (Family Medicine)  Amy Palm MD as PCP - [facility]  Radha Escobedo MD as Consulting Physician (Internal Medicine)  Leonid Joel MD as Consulting Physician (Gastroenterology)    Medical History Review  Past Medical, Family, and Social History reviewed and does contribute to the patient presenting condition    Health Maintenance   Topic Date Due    DTaP/Tdap/Td vaccine (1 - Tdap) Never done    Shingles vaccine (1 of 2) Never done    Diabetic retinal exam  09/28/2021    Colorectal Cancer Screen  12/14/2021    Diabetic Alb to Cr ratio (uACR) test  06/29/2022    Lipids  01/04/2023    Diabetic foot exam  01/06/2023    GFR test (Diabetes, CKD 3-4, OR last GFR 15-59)  08/25/2023    Depression Screen  08/30/2023    A1C test (Diabetic or Prediabetic)  08/31/2023    Annual Wellness Visit (AWV)  09/01/2023    Flu vaccine  Completed    Pneumococcal 65+ years Vaccine  Completed    COVID-19 Vaccine  Completed    Hepatitis C screen  Completed    Hepatitis A vaccine  Aged Out    Hib vaccine  Aged Out    Meningococcal (ACWY) vaccine  Aged Out

## 2023-01-05 NOTE — PATIENT INSTRUCTIONS
New Updates for [facility] MyChart/ [employer] ([facility]) TOM    Thank you for choosing US to give you the best care! [employer] ([facility]) is always trying to think of new ways to help their patients. We are asking all patients to try out the new digital registration that is now available through your [facility] account or the new TOM, [employer] ([facility]). Via the tom you're now able to update your personal and registration information prior to your upcoming appointment. This will save you time once you arrive at the office to check-in, not to mention your information remains safe!! Many other perks come from signing up for an account, such as:  Requesting refills  Scheduling an appointment  Completing an E-Visit  Sending a message to the office/provider  Having access to your medication list  Paying your bill/copay prior to your appointment  Scheduling your yearly mammogram  Review your test results    If you are not familiar with [facility] or the [employer] ([facility]) TOM, please ask one of us and we will be happy to answer any questions or help you set-up your account.       Your [facility] office,  [place]

## 2023-01-05 NOTE — PROGRESS NOTES
Chief Complaint   Patient presents with    Diabetes         Anne Ends  here today for follow up on chronic medical problems, go over labs and/or diagnostic studies, and medication refills. Diabetes      HPI: Patient is scheduled for follow-up on chronic medical problems. Diabetes, controlled A1c is 6.5, patient is on Januvia reports compliance denies any side effects. Patient monitors his blood sugars and they are usually running within range patient denies any hypoglycemic episodes. Patient has seen ophthalmologist no updated results. Patient has rheumatoid arthritis follows with rheumatologist, is currently on Remicade. Patient also takes niacin supplements. Patient denies any recent flareups reports pain is fairly controlled. Hypertension controlled, patient is on multiple medications include losartan atenolol amlodipine and Dyazide. Patient denies any side effects. Denies any chest pain shortness of breath. Hyperlipidemia stable on statins patient is due for blood work. The 10-year CVD risk score (AMI'Chica, et al., 2008) is: 43.4%    Values used to calculate the score:      Age: 81 years      Sex: Male      Diabetic: Yes      Tobacco smoker: No      Systolic Blood Pressure: 933 mmHg      Is BP treated: Yes      HDL Cholesterol: 20 mg/dL      Total Cholesterol: 120 mg/dL      Patient has anemia which is fairly stable on recent blood work. Seen by oncologist labs are stable. Vitamin D deficiency recent levels are normal we will discontinue vitamin D supplements. Stage III chronic kidney disease, kidney function is fairly stable. Patient is due for colonoscopy, and encouraged to schedule appointment. /80   Pulse 67   Temp 97.4 °F (36.3 °C)   Ht 5' 8\" (1.727 m)   Wt 195 lb (88.5 kg)   SpO2 96%   BMI 29.65 kg/m²    Body mass index is 29.65 kg/m².   Wt Readings from Last 3 Encounters:   01/05/23 195 lb (88.5 kg)   08/31/22 190 lb (86.2 kg)   05/31/22 177 lb (80.3 kg)        []Negative depression screening. PHQ Scores 1/5/2023 8/30/2022 5/31/2022 7/6/2021 1/5/2021 7/2/2020 2/14/2019   PHQ2 Score 0 0 0 0 0 0 0   PHQ9 Score 0 0 0 0 0 0 0      []1-4 = Minimal depression   []5-9 = Milddepression   []10-14 = Moderate depression   []15-19 = Moderately severe depression   []20-27 = Severe depression    Discussed testing with the patient and all questions fully answered.     Office Visit on 08/31/2022   Component Date Value Ref Range Status    Hemoglobin A1C 08/31/2022 6.2  % Final         Most recent labs reviewed:     Lab Results   Component Value Date    WBC 8.3 01/21/2022    HGB 12.4 (L) 01/21/2022    HCT 36.1 (L) 01/21/2022    MCV 91.5 01/21/2022     01/21/2022       @BRIEFLAB(NA,K,CL,CO2,BUN,CREATININE,GLUCOSE,CALCIUM)@     Lab Results   Component Value Date    ALT 31 01/04/2022    AST 51 (H) 01/04/2022    ALKPHOS 61 01/04/2022    BILITOT 0.46 01/04/2022       Lab Results   Component Value Date    TSH 4.18 01/04/2022       Lab Results   Component Value Date    CHOL 120 01/04/2022    CHOL 137 07/06/2020    CHOL 135 05/29/2019     Lab Results   Component Value Date    TRIG 160 (H) 01/04/2022    TRIG 134 07/06/2020    TRIG 115 05/29/2019     Lab Results   Component Value Date    HDL 20 (L) 01/04/2022    HDL 34 (L) 07/06/2020    HDL 35 (L) 05/29/2019     Lab Results   Component Value Date    LDLCHOLESTEROL 68 01/04/2022    LDLCHOLESTEROL 76 07/06/2020    LDLCHOLESTEROL 77 05/29/2019     Lab Results   Component Value Date    VLDL NOT REPORTED (H) 01/04/2022    VLDL NOT REPORTED 07/06/2020    VLDL NOT REPORTED 05/29/2019     Lab Results   Component Value Date    CHOLHDLRATIO 6.0 (H) 01/04/2022    CHOLHDLRATIO 4.0 07/06/2020    CHOLHDLRATIO 3.9 05/29/2019       Lab Results   Component Value Date    LABA1C 6.5 01/05/2023       Lab Results   Component Value Date    AYQLCKFR71 387 05/30/2019       Lab Results   Component Value Date    FOLATE >20.0 05/30/2019       Lab Results   Component Value Date    IRON 68 05/30/2019    TIBC 276 05/30/2019    FERRITIN 280 05/30/2019       Lab Results   Component Value Date    VITD25 51.1 06/29/2021             Current Outpatient Medications   Medication Sig Dispense Refill    Niacin (VITAMIN B-3 PO) Take by mouth      blood glucose monitor strips Test blood sugar once daily 100 strip 3    simvastatin (ZOCOR) 10 MG tablet TAKE 1 TABLET NIGHTLY 90 tablet 3    SITagliptin (JANUVIA) 50 MG tablet Take 1 tablet by mouth daily 90 tablet 5    potassium chloride (MICRO-K) 10 MEQ extended release capsule TAKE 1 CAPSULE TWICE A  capsule 3    losartan (COZAAR) 100 MG tablet TAKE 1 TABLET DAILY 90 tablet 3    atenolol (TENORMIN) 50 MG tablet TAKE ONE AND ONE-HALF TABLETS DAILY 135 tablet 3    triamterene-hydroCHLOROthiazide (DYAZIDE) 37.5-25 MG per capsule TAKE 1 CAPSULE DAILY 90 capsule 3    amLODIPine (NORVASC) 5 MG tablet TAKE 1 TABLET DAILY 90 tablet 3    ONE TOUCH ULTRASOFT LANCETS MISC 1 each by Does not apply route daily 100 each 3    ONETOUCH DELICA LANCETS FINE MISC DX:E11.65 PATIENT TO TEST 2-3 TIMES DAILY  11    vitamin D (CHOLECALCIFEROL) 1000 UNIT TABS tablet Take 1,000 Units by mouth daily      fluticasone (FLONASE) 50 MCG/ACT nasal spray 1 spray by Nasal route daily      loratadine (CLARITIN) 10 MG tablet Take 10 mg by mouth daily as needed      folic acid (FOLVITE) 1 MG tablet Take 1 mg by mouth daily      methotrexate (RHEUMATREX) 2.5 MG chemo tablet Take 2.5 mg by mouth once a week 10 tabs weekly      inFLIXimab (REMICADE) 100 MG injection Infuse 5 mg/kg intravenously See Admin Instructions Every 8 weeks       No current facility-administered medications for this visit.              Social History     Socioeconomic History    Marital status:      Spouse name: Not on file    Number of children: Not on file    Years of education: Not on file    Highest education level: Not on file   Occupational History    Not on file   Tobacco Use    Smoking status: Never    Smokeless tobacco: Never   Vaping Use    Vaping Use: Never used   Substance and Sexual Activity    Alcohol use: No     Alcohol/week: 0.0 standard drinks    Drug use: No    Sexual activity: Yes     Partners: Female   Other Topics Concern    Not on file   Social History Narrative    Not on file     Social Determinants of Health     Financial Resource Strain: Low Risk     Difficulty of Paying Living Expenses: Not hard at all   Food Insecurity: No Food Insecurity    Worried About Running Out of Food in the Last Year: Never true    Ran Out of Food in the Last Year: Never true   Transportation Needs: Not on file   Physical Activity: Insufficiently Active    Days of Exercise per Week: 2 days    Minutes of Exercise per Session: 20 min   Stress: Not on file   Social Connections: Not on file   Intimate Partner Violence: Not on file   Housing Stability: Not on file     Counseling given: Not Answered        Family History   Problem Relation Age of Onset    Other Mother         mild dysplasia    Cancer Father         throat, lung             -rest of complaints with corresponding details per ROS    The patient's past medical, surgical, social, and family history as well as his current medications and allergies were reviewed as documented intoday's encounter. Review of Systems   Constitutional:  Negative for activity change, appetite change, fatigue, fever and unexpected weight change. HENT:  Negative for congestion, ear pain, postnasal drip, rhinorrhea, sinus pressure, sore throat and trouble swallowing. Eyes:  Negative for redness and visual disturbance. Respiratory:  Negative for cough, chest tightness, shortness of breath, wheezing and stridor. Cardiovascular:  Negative for chest pain, palpitations and leg swelling. Gastrointestinal:  Negative for abdominal distention, abdominal pain, blood in stool, constipation, diarrhea, nausea, rectal pain and vomiting.    Endocrine: Negative for polydipsia, polyphagia and polyuria. Genitourinary:  Negative for difficulty urinating, flank pain, frequency and urgency. Musculoskeletal:  Positive for arthralgias, back pain, gait problem, joint swelling and myalgias. Negative for neck pain. Skin:  Negative for color change, rash and wound. Allergic/Immunologic: Negative for food allergies and immunocompromised state. Neurological:  Positive for numbness. Negative for dizziness, speech difficulty, weakness, light-headedness and headaches. Psychiatric/Behavioral:  Negative for agitation, behavioral problems, decreased concentration, dysphoric mood, hallucinations, sleep disturbance and suicidal ideas. The patient is nervous/anxious. Physical Exam  Vitals and nursing note reviewed. Constitutional:       General: He is not in acute distress. Appearance: Normal appearance. He is well-developed. He is obese. He is not diaphoretic. HENT:      Head: Normocephalic and atraumatic. Nose: Nose normal.   Eyes:      General:         Right eye: No discharge. Left eye: No discharge. Extraocular Movements: Extraocular movements intact. Conjunctiva/sclera: Conjunctivae normal.      Pupils: Pupils are equal, round, and reactive to light. Neck:      Thyroid: No thyromegaly. Cardiovascular:      Rate and Rhythm: Normal rate and regular rhythm. Pulses:           Dorsalis pedis pulses are 3+ on the right side and 3+ on the left side. Heart sounds: Normal heart sounds. No murmur heard. Pulmonary:      Effort: Pulmonary effort is normal. No respiratory distress. Breath sounds: Normal breath sounds. No wheezing or rhonchi. Abdominal:      General: Bowel sounds are normal. There is no distension. Palpations: Abdomen is soft. There is no mass. Tenderness: There is no abdominal tenderness. There is no right CVA tenderness, left CVA tenderness, guarding or rebound.    Musculoskeletal: General: No tenderness. Right hand: Deformity present. Normal strength. Left hand: Deformity present. Normal strength. Cervical back: Normal range of motion and neck supple. Spasms present. No rigidity. Thoracic back: No spasms. Normal range of motion. Lumbar back: Spasms present. Decreased range of motion. Right lower leg: No edema. Left lower leg: No edema. Right foot: Normal range of motion. No deformity. Left foot: Normal range of motion. No deformity. Feet:      Right foot:      Protective Sensation: 5 sites tested. 5 sites sensed. Skin integrity: Skin integrity normal.      Toenail Condition: Right toenails are normal.      Left foot:      Protective Sensation: 5 sites tested. 5 sites sensed. Skin integrity: Skin integrity normal.      Toenail Condition: Left toenails are normal.   Lymphadenopathy:      Cervical: No cervical adenopathy. Skin:     Coloration: Skin is not jaundiced or pale. Findings: No bruising, erythema or rash. Neurological:      General: No focal deficit present. Mental Status: He is alert and oriented to person, place, and time. Cranial Nerves: No cranial nerve deficit. Sensory: No sensory deficit. Motor: No weakness or tremor. Coordination: Coordination normal.      Gait: Gait and tandem walk normal.      Deep Tendon Reflexes: Reflexes are normal and symmetric. Psychiatric:         Attention and Perception: Attention and perception normal. He is attentive. Mood and Affect: Mood is anxious. Mood is not depressed. Affect is not tearful. Speech: He is communicative. Speech is not rapid and pressured, delayed or slurred. Behavior: Behavior normal. Behavior is not agitated or slowed. Thought Content: Thought content normal.         Judgment: Judgment normal.           ASSESSMENT AND PLAN      1.  Type 2 diabetes mellitus without complication, without long-term current use of insulin ([facility] [address]  Controlled, continue Januvia. Monitor blood sugars  Will get eye exam results  Continue low-carb diet  - POCT glycosylated hemoglobin (Hb A1C)  - blood glucose monitor strips; Test blood sugar once daily  Dispense: 100 strip; Refill: 3  -  DIABETES FOOT EXAM  - Microalbumin / Creatinine Urine Ratio; Future  - Hepatic Function Panel; Future    2. Rheumatoid arthritis involving multiple joints (HCC)  Chronic problem, continue follow-up with rheumatologist continue immunosuppressive medications  - Hepatic Function Panel; Future    3. Stage 3a chronic kidney disease (HCC)  Stable continue to monitor  Keep hydration  Avoid NSAIDs    4. Mixed hyperlipidemia  Stable continue statins    - Lipid Panel; Future    5. Vitamin D deficiency  Vitamin D levels are normal discontinue vitamin D supplements    6. Adenomatous polyp  Schedule appointment with GI for colonoscopy    7. Anemia, unspecified type  Stable likely anemia due to chronic disease continue to monitor. Orders Placed This Encounter   Procedures    Lipid Panel     Standing Status:   Future     Standing Expiration Date:   1/5/2024     Order Specific Question:   Is Patient Fasting?/# of Hours     Answer:   yes, 8-10 hours    Microalbumin / Creatinine Urine Ratio     Standing Status:   Future     Standing Expiration Date:   1/5/2024    Hepatic Function Panel     Standing Status:   Future     Standing Expiration Date:   1/5/2024    POCT glycosylated hemoglobin (Hb A1C)     DIABETES FOOT EXAM         Medications Discontinued During This Encounter   Medication Reason    blood glucose monitor strips REORDER       Carlos Enrique received counseling on the following healthy behaviors: nutrition, exercise, and medication adherence  Reviewed prior labs and health maintenance  Continue current medications, diet and exercise. Discussed use, benefit, and side effects of prescribed medications. Barriers to medication compliance addressed.   Patient given educational materials - see patient instructions  Was a self-tracking handout given in paper form or via [employer]? Yes    Requested Prescriptions     Signed Prescriptions Disp Refills    blood glucose monitor strips 100 strip 3     Sig: Test blood sugar once daily       All patient questions answered. Patient voiced understanding. Quality Measures    Body mass index is 29.65 kg/m². Elevated. Weight control planned discussed okay so. BP: 110/80. Blood pressure is normal. Treatment plan consists of Weight Reduction, DASH Eating Plan, Dietary Sodium Restriction, and No treatment change needed. Fall Risk 8/30/2022 7/6/2021 7/2/2020 2/14/2019 4/5/2018 4/5/2017   2 or more falls in past year? no no no no no no   Fall with injury in past year? no no no no no no     The patient does not have a history of falls. I did , complete a risk assessment for falls. A plan of care for falls in-office gait and balance testing performed using The Timed Up and Go Test was negative for increased falls risk- no further intervention is currently indicated, home safety tips provided, No Treatment plan indicated    Lab Results   Component Value Date    LDLCHOLESTEROL 68 01/04/2022    (goal LDL reduction with dx if diabetes is 50% LDL reduction)    PHQ Scores 1/5/2023 8/30/2022 5/31/2022 7/6/2021 1/5/2021 7/2/2020 2/14/2019   PHQ2 Score 0 0 0 0 0 0 0   PHQ9 Score 0 0 0 0 0 0 0     Interpretation of Total Score Depression Severity: 1-4 = Minimal depression, 5-9 = Mild depression, 10-14 = Moderate depression, 15-19 = Moderately severe depression, 20-27 = Severe depression    The patient'spast medical, surgical, social, and family history as well as his   current medications and allergies were reviewed as documented in today's encounter. Medications, labs, diagnostic studies, consultations andfollow-up as documented in this encounter. Return for 30min,always chronic conditons.     Patient wasseen with total face to face time of 30  minutes. More than 50% of this visit was counseling and education. Future Appointments   Date Time Provider Papito Simms   5/8/2023 12:30 PM MD elham Greenberg     This note was completed by using the assistance of a speech-recognition program. However, inadvertent computerized transcription errors may be present. Althoughevery effort was made to ensure accuracy, no guarantees can be provided that every mistake has been identified and corrected by editing.   Electronically signed by Ludy Jenkins MD on 1/5/2023  2:13 PM

## 2023-01-06 PROBLEM — N18.30 CHRONIC RENAL DISEASE, STAGE III (HCC): Status: RESOLVED | Noted: 2022-05-31 | Resolved: 2023-01-06

## 2023-02-22 DIAGNOSIS — E87.6 HYPOKALEMIA: ICD-10-CM

## 2023-02-22 DIAGNOSIS — I10 ESSENTIAL HYPERTENSION: ICD-10-CM

## 2023-02-23 RX ORDER — ATENOLOL 50 MG/1
TABLET ORAL
Qty: 135 TABLET | Refills: 3 | Status: SHIPPED | OUTPATIENT
Start: 2023-02-23

## 2023-02-23 RX ORDER — AMLODIPINE BESYLATE 5 MG/1
TABLET ORAL
Qty: 90 TABLET | Refills: 3 | Status: SHIPPED | OUTPATIENT
Start: 2023-02-23

## 2023-02-23 RX ORDER — LOSARTAN POTASSIUM 100 MG/1
TABLET ORAL
Qty: 90 TABLET | Refills: 3 | Status: SHIPPED | OUTPATIENT
Start: 2023-02-23

## 2023-02-23 RX ORDER — POTASSIUM CHLORIDE 750 MG/1
CAPSULE, EXTENDED RELEASE ORAL
Qty: 180 CAPSULE | Refills: 3 | Status: SHIPPED | OUTPATIENT
Start: 2023-02-23

## 2023-02-23 RX ORDER — TRIAMTERENE AND HYDROCHLOROTHIAZIDE 37.5; 25 MG/1; MG/1
CAPSULE ORAL
Qty: 90 CAPSULE | Refills: 3 | Status: SHIPPED | OUTPATIENT
Start: 2023-02-23

## 2023-02-23 NOTE — TELEPHONE ENCOUNTER
Please Approve or Refuse.   Send to Pharmacy per Pt's Request: [facility]     Next Visit Date:  5/8/2023   Last Visit Date: 1/5/2023    Hemoglobin A1C (%)   Date Value   01/05/2023 6.5   08/31/2022 6.2   05/31/2022 6.0             ( goal A1C is < 7)   BP Readings from Last 3 Encounters:   01/05/23 110/80   08/31/22 118/82   05/31/22 110/70          (goal 120/80)  BUN   Date Value Ref Range Status   08/25/2022 28 (H) 8 - 23 mg/dL Final     Creatinine   Date Value Ref Range Status   08/25/2022 1.19 0.70 - 1.20 mg/dL Final     Potassium   Date Value Ref Range Status   08/25/2022 4.0 3.7 - 5.3 mmol/L Final

## 2023-04-05 DIAGNOSIS — E11.9 TYPE 2 DIABETES MELLITUS WITHOUT COMPLICATION, WITHOUT LONG-TERM CURRENT USE OF INSULIN (HCC): ICD-10-CM

## 2023-04-05 RX ORDER — SITAGLIPTIN 50 MG/1
TABLET, FILM COATED ORAL
Qty: 90 TABLET | Refills: 3 | Status: SHIPPED | OUTPATIENT
Start: 2023-04-05

## 2023-04-05 NOTE — TELEPHONE ENCOUNTER
Please Approve or Refuse.   Send to Pharmacy per Pt's Request:      Next Visit Date:  7/24/2023   Last Visit Date: 1/5/2023    Hemoglobin A1C (%)   Date Value   01/05/2023 6.5   08/31/2022 6.2   05/31/2022 6.0             ( goal A1C is < 7)   BP Readings from Last 3 Encounters:   01/05/23 110/80   08/31/22 118/82   05/31/22 110/70          (goal 120/80)  BUN   Date Value Ref Range Status   08/25/2022 28 (H) 8 - 23 mg/dL Final     Creatinine   Date Value Ref Range Status   08/25/2022 1.19 0.70 - 1.20 mg/dL Final     Potassium   Date Value Ref Range Status   08/25/2022 4.0 3.7 - 5.3 mmol/L Final

## 2023-07-18 ENCOUNTER — HOSPITAL ENCOUNTER (OUTPATIENT)
Age: 73
Setting detail: SPECIMEN
Discharge: HOME | End: 2023-07-18

## 2023-07-18 ENCOUNTER — OFFICE VISIT (OUTPATIENT)
Dept: FAMILY MEDICINE CLINIC | Age: 73
End: 2023-07-18
Payer: MEDICARE

## 2023-07-18 VITALS
SYSTOLIC BLOOD PRESSURE: 126 MMHG | HEIGHT: 68 IN | BODY MASS INDEX: 31.22 KG/M2 | WEIGHT: 206 LBS | DIASTOLIC BLOOD PRESSURE: 80 MMHG | OXYGEN SATURATION: 98 % | HEART RATE: 67 BPM

## 2023-07-18 DIAGNOSIS — M06.9 RHEUMATOID ARTHRITIS INVOLVING MULTIPLE JOINTS (HCC): ICD-10-CM

## 2023-07-18 DIAGNOSIS — E11.9 TYPE 2 DIABETES MELLITUS WITHOUT COMPLICATION, WITHOUT LONG-TERM CURRENT USE OF INSULIN (HCC): Primary | ICD-10-CM

## 2023-07-18 DIAGNOSIS — I10 ESSENTIAL HYPERTENSION: ICD-10-CM

## 2023-07-18 DIAGNOSIS — E78.2 MIXED HYPERLIPIDEMIA: ICD-10-CM

## 2023-07-18 DIAGNOSIS — E55.9 VITAMIN D DEFICIENCY: ICD-10-CM

## 2023-07-18 DIAGNOSIS — D36.9 ADENOMATOUS POLYP: ICD-10-CM

## 2023-07-18 DIAGNOSIS — Z12.5 PROSTATE CANCER SCREENING: ICD-10-CM

## 2023-07-18 DIAGNOSIS — E11.9 TYPE 2 DIABETES MELLITUS WITHOUT COMPLICATION, WITHOUT LONG-TERM CURRENT USE OF INSULIN (HCC): ICD-10-CM

## 2023-07-18 DIAGNOSIS — D64.9 ANEMIA, UNSPECIFIED TYPE: ICD-10-CM

## 2023-07-18 LAB
ALBUMIN SERPL-MCNC: 3.6 G/DL (ref 3.5–5.2)
ALBUMIN/GLOB SERPL: 0.9 {RATIO} (ref 1–2.5)
ALP SERPL-CCNC: 65 U/L (ref 40–129)
ALT SERPL-CCNC: 33 U/L (ref 5–41)
AST SERPL-CCNC: 56 U/L
BILIRUB DIRECT SERPL-MCNC: 0.2 MG/DL
BILIRUB INDIRECT SERPL-MCNC: 0.6 MG/DL (ref 0–1)
BILIRUB SERPL-MCNC: 0.8 MG/DL (ref 0.3–1.2)
CHOLEST SERPL-MCNC: 138 MG/DL
CHOLESTEROL/HDL RATIO: 2.9
CREAT UR-MCNC: 76.2 MG/DL (ref 39–259)
HBA1C MFR BLD: 6.7 %
HDLC SERPL-MCNC: 47 MG/DL
LDLC SERPL CALC-MCNC: 70 MG/DL (ref 0–130)
MICROALBUMIN UR-MCNC: 20 MG/L
MICROALBUMIN/CREAT UR-RTO: 26 MCG/MG CREAT
PROT SERPL-MCNC: 7.7 G/DL (ref 6.4–8.3)
TRIGL SERPL-MCNC: 103 MG/DL

## 2023-07-18 PROCEDURE — 3044F HG A1C LEVEL LT 7.0%: CPT | Performed by: FAMILY MEDICINE

## 2023-07-18 PROCEDURE — 1036F TOBACCO NON-USER: CPT | Performed by: FAMILY MEDICINE

## 2023-07-18 PROCEDURE — G8427 DOCREV CUR MEDS BY ELIG CLIN: HCPCS | Performed by: FAMILY MEDICINE

## 2023-07-18 PROCEDURE — 3074F SYST BP LT 130 MM HG: CPT | Performed by: FAMILY MEDICINE

## 2023-07-18 PROCEDURE — 1123F ACP DISCUSS/DSCN MKR DOCD: CPT | Performed by: FAMILY MEDICINE

## 2023-07-18 PROCEDURE — 3079F DIAST BP 80-89 MM HG: CPT | Performed by: FAMILY MEDICINE

## 2023-07-18 PROCEDURE — 2022F DILAT RTA XM EVC RTNOPTHY: CPT | Performed by: FAMILY MEDICINE

## 2023-07-18 PROCEDURE — 3017F COLORECTAL CA SCREEN DOC REV: CPT | Performed by: FAMILY MEDICINE

## 2023-07-18 PROCEDURE — G8417 CALC BMI ABV UP PARAM F/U: HCPCS | Performed by: FAMILY MEDICINE

## 2023-07-18 PROCEDURE — 83037 HB GLYCOSYLATED A1C HOME DEV: CPT | Performed by: FAMILY MEDICINE

## 2023-07-18 PROCEDURE — 99214 OFFICE O/P EST MOD 30 MIN: CPT | Performed by: FAMILY MEDICINE

## 2023-07-18 RX ORDER — GLUCOSAMINE HCL/CHONDROITIN SU 500-400 MG
CAPSULE ORAL
Qty: 100 STRIP | Refills: 3 | Status: SHIPPED | OUTPATIENT
Start: 2023-07-18

## 2023-07-18 SDOH — ECONOMIC STABILITY: HOUSING INSECURITY
IN THE LAST 12 MONTHS, WAS THERE A TIME WHEN YOU DID NOT HAVE A STEADY PLACE TO SLEEP OR SLEPT IN A SHELTER (INCLUDING NOW)?: NO

## 2023-07-18 SDOH — ECONOMIC STABILITY: FOOD INSECURITY: WITHIN THE PAST 12 MONTHS, YOU WORRIED THAT YOUR FOOD WOULD RUN OUT BEFORE YOU GOT MONEY TO BUY MORE.: NEVER TRUE

## 2023-07-18 SDOH — ECONOMIC STABILITY: INCOME INSECURITY: HOW HARD IS IT FOR YOU TO PAY FOR THE VERY BASICS LIKE FOOD, HOUSING, MEDICAL CARE, AND HEATING?: NOT HARD AT ALL

## 2023-07-18 SDOH — ECONOMIC STABILITY: FOOD INSECURITY: WITHIN THE PAST 12 MONTHS, THE FOOD YOU BOUGHT JUST DIDN'T LAST AND YOU DIDN'T HAVE MONEY TO GET MORE.: NEVER TRUE

## 2023-07-18 ASSESSMENT — ENCOUNTER SYMPTOMS
BACK PAIN: 1
SORE THROAT: 0
DIARRHEA: 0
WHEEZING: 0
TROUBLE SWALLOWING: 0
CHEST TIGHTNESS: 0
COLOR CHANGE: 0
COUGH: 0
CONSTIPATION: 0
EYE REDNESS: 0
RECTAL PAIN: 0
SHORTNESS OF BREATH: 0
STRIDOR: 0
ABDOMINAL PAIN: 0
RHINORRHEA: 0
BLOOD IN STOOL: 0
SINUS PRESSURE: 0
VOMITING: 0
NAUSEA: 0
ABDOMINAL DISTENTION: 0

## 2023-07-18 ASSESSMENT — PATIENT HEALTH QUESTIONNAIRE - PHQ9
SUM OF ALL RESPONSES TO PHQ9 QUESTIONS 1 & 2: 0
SUM OF ALL RESPONSES TO PHQ QUESTIONS 1-9: 0
1. LITTLE INTEREST OR PLEASURE IN DOING THINGS: 0
SUM OF ALL RESPONSES TO PHQ QUESTIONS 1-9: 0
2. FEELING DOWN, DEPRESSED OR HOPELESS: 0
SUM OF ALL RESPONSES TO PHQ QUESTIONS 1-9: 0
SUM OF ALL RESPONSES TO PHQ QUESTIONS 1-9: 0

## 2023-07-18 NOTE — PATIENT INSTRUCTIONS
Thank you for choosing [facility]) as your healthcare provider as your care is important to us. Please arrive at your appointment on time. If you are unable to make your appointment, please call our office as soon as possible so that we may reschedule your appointment. Missing 3 appointments in a calendar year without notifying the office may lead to dismissal from the practice. We appreciate you calling at least 24 hours in advance, when possible. Thank you. New Updates for [facility]    Thank you for choosing US to give you the best care! [facility]) is always trying to think of new ways to help their patients. We are asking all patients to try out the new digital registration that is now available through your [facility] account Via the tom you're now able to update your personal and registration information prior to your upcoming appointment. This will save you time once you arrive at the office to check-in, not to mention your information remains safe!! Many other perks come from signing up for an account, such as:  Requesting refills  Scheduling an appointment  Completing an E-Visit  Sending a message to the office/provider  Having access to your medication list  Paying your bill/copay prior to your appointment  Scheduling your yearly mammogram  Review your test results    If you are not familiar with [facility] please ask one of us and we will be happy to answer any questions or help you set-up your account.       Your [employer],

## 2023-07-18 NOTE — PROGRESS NOTES
Visit Information    Have you changed or started any medications since your last visit including any over-the-counter medicines, vitamins, or herbal medicines? no   Are you having any side effects from any of your medications? -  no  Have you stopped taking any of your medications? Is so, why? -  no    Have you seen any other physician or provider since your last visit? No  Have you had any other diagnostic tests since your last visit? No  Have you been seen in the emergency room and/or had an admission to a hospital since we last saw you? No  Have you had your routine dental cleaning in the past 6 months? yes     Have you activated your [employer] account? If not, what are your barriers?  Yes     Patient Care Team:  Erick Rios MD as PCP - General (Family Medicine)  [facility] R[facility]  Hieu Chamberlain MD as Consulting Physician (Internal Medicine)  Nidhi Toussaint MD as Consulting Physician (Gastroenterology)    Medical History Review  Past Medical, Family, and Social History reviewed and does contribute to the patient presenting condition    Health Maintenance   Topic Date Due    Diabetic retinal exam  Never done    DTaP/Tdap/Td vaccine (1 - Tdap) Never done    Shingles vaccine (1 of 2) Never done    Colorectal Cancer Screen  12/14/2021    Diabetic Alb to Cr ratio (uACR) test  06/29/2022    Lipids  01/04/2023    Flu vaccine (1) 08/01/2023    Annual Wellness Visit (AWV)  09/01/2023    Diabetic foot exam  01/05/2024    A1C test (Diabetic or Prediabetic)  01/05/2024    Depression Screen  01/05/2024    GFR test (Diabetes, CKD 3-4, OR last GFR 15-59)  06/07/2024    Pneumococcal 65+ years Vaccine  Completed    COVID-19 Vaccine  Completed    Hepatitis C screen  Completed    Hepatitis A vaccine  Aged Out    Hib vaccine  Aged Out    Meningococcal (ACWY) vaccine  Aged Out    Prostate Specific Antigen (PSA) Screening or Monitoring  Discontinued
dysplasia    Cancer Father         throat, lung             -rest of complaints with corresponding details per ROS    The patient's past medical, surgical, social, and family history as well as his current medications and allergies were reviewed as documented intoday's encounter. Review of Systems   Constitutional:  Positive for activity change and unexpected weight change. Negative for appetite change, fatigue and fever. HENT:  Negative for congestion, ear pain, postnasal drip, rhinorrhea, sinus pressure, sore throat and trouble swallowing. Eyes:  Positive for visual disturbance. Negative for redness. Respiratory:  Negative for cough, chest tightness, shortness of breath, wheezing and stridor. Cardiovascular:  Negative for chest pain, palpitations and leg swelling. Gastrointestinal:  Negative for abdominal distention, abdominal pain, blood in stool, constipation, diarrhea, nausea, rectal pain and vomiting. Endocrine: Negative for polydipsia, polyphagia and polyuria. Genitourinary:  Negative for difficulty urinating, flank pain, frequency and urgency. Musculoskeletal:  Positive for arthralgias, back pain, gait problem, myalgias and neck pain. Skin:  Negative for color change, rash and wound. Allergic/Immunologic: Negative for food allergies and immunocompromised state. Neurological:  Positive for weakness and numbness. Negative for dizziness, speech difficulty, light-headedness and headaches. Psychiatric/Behavioral:  Positive for decreased concentration and dysphoric mood. Negative for agitation, behavioral problems, hallucinations, sleep disturbance and suicidal ideas. The patient is nervous/anxious. Physical Exam  Vitals and nursing note reviewed. Constitutional:       General: He is not in acute distress. Appearance: Normal appearance. He is well-developed. He is obese. He is not diaphoretic. HENT:      Head: Normocephalic and atraumatic.       Nose: Nose normal.

## 2023-08-05 DIAGNOSIS — E78.2 MIXED HYPERLIPIDEMIA: ICD-10-CM

## 2023-08-07 RX ORDER — SIMVASTATIN 10 MG
TABLET ORAL
Qty: 90 TABLET | Refills: 3 | Status: SHIPPED | OUTPATIENT
Start: 2023-08-07

## 2023-08-07 NOTE — TELEPHONE ENCOUNTER
Please Approve or Refuse.   Send to Pharmacy per Pt's Request:      Next Visit Date:  10/18/2023   Last Visit Date: 7/18/2023    Hemoglobin A1C (%)   Date Value   07/18/2023 6.7   01/05/2023 6.5   08/31/2022 6.2             ( goal A1C is < 7)   BP Readings from Last 3 Encounters:   07/18/23 126/80   01/05/23 110/80   08/31/22 118/82          (goal 120/80)  BUN   Date Value Ref Range Status   08/25/2022 28 (H) 8 - 23 mg/dL Final     Creatinine   Date Value Ref Range Status   08/02/2023 1.12 (0.66  - 1.25) MG/DL Final     Potassium   Date Value Ref Range Status   08/25/2022 4.0 3.7 - 5.3 mmol/L Final

## 2023-10-11 ENCOUNTER — HOSPITAL ENCOUNTER (OUTPATIENT)
Age: 73
Setting detail: SPECIMEN
Discharge: HOME | End: 2023-10-11

## 2023-10-11 DIAGNOSIS — I10 ESSENTIAL HYPERTENSION: ICD-10-CM

## 2023-10-11 DIAGNOSIS — Z12.5 PROSTATE CANCER SCREENING: ICD-10-CM

## 2023-10-11 DIAGNOSIS — E11.9 TYPE 2 DIABETES MELLITUS WITHOUT COMPLICATION, WITHOUT LONG-TERM CURRENT USE OF INSULIN (HCC): ICD-10-CM

## 2023-10-11 DIAGNOSIS — M06.9 RHEUMATOID ARTHRITIS INVOLVING MULTIPLE JOINTS (HCC): ICD-10-CM

## 2023-10-11 DIAGNOSIS — D64.9 ANEMIA, UNSPECIFIED TYPE: ICD-10-CM

## 2023-10-11 LAB
ANION GAP SERPL CALCULATED.3IONS-SCNC: 12 MMOL/L (ref 9–17)
BUN SERPL-MCNC: 28 MG/DL (ref 8–23)
CALCIUM SERPL-MCNC: 9.6 MG/DL (ref 8.6–10.4)
CHLORIDE SERPL-SCNC: 103 MMOL/L (ref 98–107)
CO2 SERPL-SCNC: 24 MMOL/L (ref 20–31)
CREAT SERPL-MCNC: 1.1 MG/DL (ref 0.7–1.2)
CREAT UR-MCNC: 215 MG/DL (ref 39–259)
ERYTHROCYTE [DISTWIDTH] IN BLOOD BY AUTOMATED COUNT: 15.6 % (ref 11.8–14.4)
FERRITIN SERPL-MCNC: 397 NG/ML (ref 30–400)
FOLATE SERPL-MCNC: 11.8 NG/ML (ref 4.8–24.2)
GFR SERPL CREATININE-BSD FRML MDRD: >60 ML/MIN/1.73M2
GLUCOSE SERPL-MCNC: 135 MG/DL (ref 70–99)
HCT VFR BLD AUTO: 44.6 % (ref 40.7–50.3)
HGB BLD-MCNC: 14.8 G/DL (ref 13–17)
IRON SATN MFR SERPL: 41 % (ref 20–55)
IRON SERPL-MCNC: 129 UG/DL (ref 59–158)
MAGNESIUM SERPL-MCNC: 1.9 MG/DL (ref 1.6–2.6)
MCH RBC QN AUTO: 32 PG (ref 25.2–33.5)
MCHC RBC AUTO-ENTMCNC: 33.2 G/DL (ref 28.4–34.8)
MCV RBC AUTO: 96.3 FL (ref 82.6–102.9)
MICROALBUMIN UR-MCNC: 62 MG/L (ref 0–20)
MICROALBUMIN/CREAT UR-RTO: 29 MCG/MG CREAT (ref 0–17)
NRBC BLD-RTO: 0 PER 100 WBC
PLATELET # BLD AUTO: 106 K/UL (ref 138–453)
PMV BLD AUTO: 10.6 FL (ref 8.1–13.5)
POTASSIUM SERPL-SCNC: 3.9 MMOL/L (ref 3.7–5.3)
PSA SERPL-MCNC: 1.4 NG/ML (ref 0–4)
RBC # BLD AUTO: 4.63 M/UL (ref 4.21–5.77)
SODIUM SERPL-SCNC: 139 MMOL/L (ref 135–144)
TIBC SERPL-MCNC: 313 UG/DL (ref 250–450)
TSH SERPL DL<=0.05 MIU/L-ACNC: 3.83 UIU/ML (ref 0.3–5)
UNSATURATED IRON BINDING CAPACITY: 184 UG/DL (ref 112–347)
URATE SERPL-MCNC: 4.7 MG/DL (ref 3.4–7)
VIT B12 SERPL-MCNC: 676 PG/ML (ref 232–1245)
WBC OTHER # BLD: 6.1 K/UL (ref 3.5–11.3)

## 2023-11-12 SDOH — HEALTH STABILITY: PHYSICAL HEALTH: ON AVERAGE, HOW MANY MINUTES DO YOU ENGAGE IN EXERCISE AT THIS LEVEL?: 10 MIN

## 2023-11-12 SDOH — HEALTH STABILITY: PHYSICAL HEALTH: ON AVERAGE, HOW MANY DAYS PER WEEK DO YOU ENGAGE IN MODERATE TO STRENUOUS EXERCISE (LIKE A BRISK WALK)?: 3 DAYS

## 2023-11-12 ASSESSMENT — PATIENT HEALTH QUESTIONNAIRE - PHQ9
SUM OF ALL RESPONSES TO PHQ QUESTIONS 1-9: 0
SUM OF ALL RESPONSES TO PHQ9 QUESTIONS 1 & 2: 0
2. FEELING DOWN, DEPRESSED OR HOPELESS: 0
SUM OF ALL RESPONSES TO PHQ QUESTIONS 1-9: 0
1. LITTLE INTEREST OR PLEASURE IN DOING THINGS: 0

## 2023-11-12 ASSESSMENT — LIFESTYLE VARIABLES
HOW MANY STANDARD DRINKS CONTAINING ALCOHOL DO YOU HAVE ON A TYPICAL DAY: PATIENT DOES NOT DRINK
HOW OFTEN DO YOU HAVE A DRINK CONTAINING ALCOHOL: NEVER
HOW OFTEN DO YOU HAVE SIX OR MORE DRINKS ON ONE OCCASION: 1
HOW OFTEN DO YOU HAVE A DRINK CONTAINING ALCOHOL: 1
HOW MANY STANDARD DRINKS CONTAINING ALCOHOL DO YOU HAVE ON A TYPICAL DAY: 0

## 2023-11-14 ENCOUNTER — OFFICE VISIT (OUTPATIENT)
Dept: FAMILY MEDICINE CLINIC | Age: 73
End: 2023-11-14

## 2023-11-14 VITALS
HEART RATE: 58 BPM | WEIGHT: 204 LBS | SYSTOLIC BLOOD PRESSURE: 110 MMHG | OXYGEN SATURATION: 97 % | HEIGHT: 68 IN | BODY MASS INDEX: 30.92 KG/M2 | DIASTOLIC BLOOD PRESSURE: 80 MMHG

## 2023-11-14 DIAGNOSIS — E11.9 TYPE 2 DIABETES MELLITUS WITHOUT COMPLICATION, WITHOUT LONG-TERM CURRENT USE OF INSULIN (HCC): ICD-10-CM

## 2023-11-14 DIAGNOSIS — Z13.6 SCREENING FOR CARDIOVASCULAR CONDITION: ICD-10-CM

## 2023-11-14 DIAGNOSIS — D69.6 THROMBOCYTOPENIA, UNSPECIFIED (HCC): ICD-10-CM

## 2023-11-14 DIAGNOSIS — Z00.00 MEDICARE ANNUAL WELLNESS VISIT, SUBSEQUENT: Primary | ICD-10-CM

## 2023-11-14 LAB — HBA1C MFR BLD: 7.1 %

## 2023-11-14 SDOH — ECONOMIC STABILITY: INCOME INSECURITY: HOW HARD IS IT FOR YOU TO PAY FOR THE VERY BASICS LIKE FOOD, HOUSING, MEDICAL CARE, AND HEATING?: NOT HARD AT ALL

## 2023-11-14 SDOH — ECONOMIC STABILITY: FOOD INSECURITY: WITHIN THE PAST 12 MONTHS, YOU WORRIED THAT YOUR FOOD WOULD RUN OUT BEFORE YOU GOT MONEY TO BUY MORE.: NEVER TRUE

## 2023-11-14 SDOH — ECONOMIC STABILITY: FOOD INSECURITY: WITHIN THE PAST 12 MONTHS, THE FOOD YOU BOUGHT JUST DIDN'T LAST AND YOU DIDN'T HAVE MONEY TO GET MORE.: NEVER TRUE

## 2023-11-14 ASSESSMENT — VISUAL ACUITY
OS_CC: 20/13
OD_CC: 20/13

## 2023-11-14 NOTE — PROGRESS NOTES
Screening or Monitoring  Discontinued
acid (FOLVITE) 1 MG tablet Take 1 tablet by mouth daily Yes ProviderJagjit MD   methotrexate (RHEUMATREX) 2.5 MG chemo tablet Take 1 tablet by mouth once a week 10 tabs weekly Yes ProviderJagjit MD   inFLIXimab (REMICADE) 100 MG injection Infuse 5 mg/kg intravenously See Admin Instructions Every 8 weeks Yes ProviderJagjit MD       CareTeam (Including outside providers/suppliers regularly involved in providing care):   Patient Care Team:  Marybel Rollins MD as PCP - General (Family Medicine)  Marybel Rollins MD as PCP - Empaneled Provider  New Silverman MD as Consulting Physician (Internal Medicine)  Cintia Nolen MD as Consulting Physician (Gastroenterology)     Reviewed and updated this visit:  Tobacco  Allergies  Meds  Problems  Med Hx  Surg Hx  Soc Hx  Fam Hx

## 2023-11-14 NOTE — PATIENT INSTRUCTIONS
lifestyle, illnesses that may run in your family, and various assessments and screenings as appropriate. After reviewing your medical record and screening and assessments performed today your provider may have ordered immunizations, labs, imaging, and/or referrals for you. A list of these orders (if applicable) as well as your Preventive Care list are included within your After Visit Summary for your review. Other Preventive Recommendations:    A preventive eye exam performed by an eye specialist is recommended every 1-2 years to screen for glaucoma; cataracts, macular degeneration, and other eye disorders. A preventive dental visit is recommended every 6 months. Try to get at least 150 minutes of exercise per week or 10,000 steps per day on a pedometer . Order or download the FREE \"Exercise & Physical Activity: Your Everyday Guide\" from [employer]. Call 1-769.472.7248 or search [employer] online. You need 6327-0858 mg of calcium and 1582-3594 IU of vitamin D per day. It is possible to meet your calcium requirement with diet alone, but a vitamin D supplement is usually necessary to meet this goal.  When exposed to the sun, use a sunscreen that protects against both UVA and UVB radiation with an SPF of 30 or greater. Reapply every 2 to 3 hours or after sweating, drying off with a towel, or swimming. Always wear a seat belt when traveling in a car. Always wear a helmet when riding a bicycle or motorcycle.

## 2024-07-16 ENCOUNTER — HOSPITAL ENCOUNTER (OUTPATIENT)
Age: 74
Setting detail: SPECIMEN
Discharge: HOME | End: 2024-07-16

## 2024-07-16 LAB
ALBUMIN SERPL-MCNC: 4 G/DL (ref 3.5–5.2)
ALBUMIN/GLOB SERPL: 1 {RATIO} (ref 1–2.5)
ALP SERPL-CCNC: 63 U/L (ref 40–129)
ALT SERPL-CCNC: 47 U/L (ref 10–50)
ANION GAP SERPL CALCULATED.3IONS-SCNC: 10 MMOL/L (ref 9–16)
AST SERPL-CCNC: 70 U/L (ref 10–50)
BASOPHILS # BLD: 0.04 K/UL (ref 0–0.2)
BASOPHILS NFR BLD: 1 % (ref 0–2)
BILIRUB SERPL-MCNC: 0.8 MG/DL (ref 0–1.2)
BUN SERPL-MCNC: 26 MG/DL (ref 8–23)
CALCIUM SERPL-MCNC: 9.1 MG/DL (ref 8.6–10.4)
CHLORIDE SERPL-SCNC: 105 MMOL/L (ref 98–107)
CHOLEST SERPL-MCNC: 121 MG/DL (ref 0–199)
CHOLESTEROL/HDL RATIO: 3
CO2 SERPL-SCNC: 24 MMOL/L (ref 20–31)
CREAT SERPL-MCNC: 1.4 MG/DL (ref 0.7–1.2)
CREAT UR-MCNC: 134 MG/DL (ref 39–259)
EOSINOPHIL # BLD: 0.19 K/UL (ref 0–0.44)
EOSINOPHILS RELATIVE PERCENT: 5 % (ref 1–4)
ERYTHROCYTE [DISTWIDTH] IN BLOOD BY AUTOMATED COUNT: 16.2 % (ref 11.8–14.4)
EST. AVERAGE GLUCOSE BLD GHB EST-MCNC: 166 MG/DL
GFR, ESTIMATED: 54 ML/MIN/1.73M2
GLUCOSE SERPL-MCNC: 140 MG/DL (ref 74–99)
HBA1C MFR BLD: 7.4 % (ref 4–6)
HCT VFR BLD AUTO: 41.1 % (ref 40.7–50.3)
HDLC SERPL-MCNC: 41 MG/DL
HGB BLD-MCNC: 14 G/DL (ref 13–17)
IMM GRANULOCYTES # BLD AUTO: <0.03 K/UL (ref 0–0.3)
IMM GRANULOCYTES NFR BLD: 0 %
LDLC SERPL CALC-MCNC: 60 MG/DL (ref 0–100)
LYMPHOCYTES NFR BLD: 1.57 K/UL (ref 1.1–3.7)
LYMPHOCYTES RELATIVE PERCENT: 38 % (ref 24–43)
MCH RBC QN AUTO: 32.1 PG (ref 25.2–33.5)
MCHC RBC AUTO-ENTMCNC: 34.1 G/DL (ref 28.4–34.8)
MCV RBC AUTO: 94.3 FL (ref 82.6–102.9)
MICROALBUMIN UR-MCNC: <12 MG/L (ref 0–20)
MICROALBUMIN/CREAT UR-RTO: NORMAL MCG/MG CREAT (ref 0–17)
MONOCYTES NFR BLD: 0.28 K/UL (ref 0.1–1.2)
MONOCYTES NFR BLD: 7 % (ref 3–12)
NEUTROPHILS NFR BLD: 49 % (ref 36–65)
NEUTS SEG NFR BLD: 2.11 K/UL (ref 1.5–8.1)
NRBC BLD-RTO: 0 PER 100 WBC
PLATELET # BLD AUTO: 78 K/UL (ref 138–453)
PMV BLD AUTO: 11.1 FL (ref 8.1–13.5)
POTASSIUM SERPL-SCNC: 3.7 MMOL/L (ref 3.7–5.3)
PROT SERPL-MCNC: 7.2 G/DL (ref 6.6–8.7)
PSA SERPL-MCNC: 0.7 NG/ML (ref 0–4)
RBC # BLD AUTO: 4.36 M/UL (ref 4.21–5.77)
RBC # BLD: ABNORMAL 10*6/UL
SODIUM SERPL-SCNC: 139 MMOL/L (ref 136–145)
TRIGL SERPL-MCNC: 104 MG/DL (ref 0–149)
VLDLC SERPL CALC-MCNC: 21 MG/DL
WBC OTHER # BLD: 4.2 K/UL (ref 3.5–11.3)

## 2025-07-08 ENCOUNTER — APPOINTMENT (OUTPATIENT)
Dept: CT IMAGING | Age: 75
End: 2025-07-08
Payer: MEDICARE

## 2025-07-08 ENCOUNTER — HOSPITAL ENCOUNTER (INPATIENT)
Age: 75
LOS: 3 days | Discharge: HOME | End: 2025-07-12
Attending: EMERGENCY MEDICINE
Payer: MEDICARE

## 2025-07-08 ENCOUNTER — APPOINTMENT (OUTPATIENT)
Dept: GENERAL RADIOLOGY | Age: 75
End: 2025-07-08
Payer: MEDICARE

## 2025-07-08 DIAGNOSIS — I72.9 ANEURYSM: ICD-10-CM

## 2025-07-08 DIAGNOSIS — R55 SYNCOPE, UNSPECIFIED SYNCOPE TYPE: ICD-10-CM

## 2025-07-08 DIAGNOSIS — R09.89 SYMPTOMS OF CEREBROVASCULAR ACCIDENT: ICD-10-CM

## 2025-07-08 DIAGNOSIS — I95.9 ACUTE HYPOTENSION: ICD-10-CM
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
## 2025-07-08 LAB
ALBUMIN SERPL-MCNC: 3.2 G/DL (ref 3.5–5.2)
ALP SERPL-CCNC: 47 U/L (ref 40–129)
ALT SERPL-CCNC: 35 U/L (ref 10–50)
ANION GAP SERPL CALCULATED.3IONS-SCNC: 14 MMOL/L (ref 9–16)
AST SERPL-CCNC: 60 U/L (ref 10–50)
BACTERIA URNS QL MICRO: ABNORMAL
BASOPHILS # BLD: <0.03 K/UL (ref 0–0.2)
BASOPHILS NFR BLD: 0 % (ref 0–2)
BILIRUB SERPL-MCNC: 0.9 MG/DL (ref 0–1.2)
BILIRUB UR QL STRIP: NEGATIVE
BUN SERPL-MCNC: 22 MG/DL (ref 8–23)
CALCIUM SERPL-MCNC: 8.7 MG/DL (ref 8.6–10.4)
CASTS #/AREA URNS LPF: ABNORMAL /LPF
CHLORIDE SERPL-SCNC: 106 MMOL/L (ref 98–107)
CHP ED QC CHECK: NORMAL
CLARITY UR: CLEAR
CO2 SERPL-SCNC: 19 MMOL/L (ref 20–31)
COLOR UR: YELLOW
CREAT SERPL-MCNC: 1.5 MG/DL (ref 0.7–1.2)
EOSINOPHIL # BLD: <0.03 K/UL (ref 0–0.44)
EOSINOPHILS RELATIVE PERCENT: 1 % (ref 0–4)
EPI CELLS #/AREA URNS HPF: ABNORMAL /HPF
ERYTHROCYTE [DISTWIDTH] IN BLOOD BY AUTOMATED COUNT: 15.9 % (ref 11.5–14.9)
GFR, ESTIMATED: 49 ML/MIN/1.73M2
GLUCOSE BLD-MCNC: 166 MG/DL
GLUCOSE BLD-MCNC: 166 MG/DL (ref 75–110)
GLUCOSE SERPL-MCNC: 174 MG/DL (ref 74–99)
GLUCOSE UR STRIP-MCNC: ABNORMAL MG/DL
HCT VFR BLD AUTO: 34.3 % (ref 41–53)
HGB BLD-MCNC: 11.7 G/DL (ref 13.5–17.5)
HGB UR QL STRIP.AUTO: NEGATIVE
IMM GRANULOCYTES # BLD AUTO: <0.03 K/UL (ref 0–0.3)
IMM GRANULOCYTES NFR BLD: 0 %
KETONES UR STRIP-MCNC: ABNORMAL MG/DL
LACTATE BLDV-SCNC: 1.9 MMOL/L (ref 0.5–2.2)
LEUKOCYTE ESTERASE UR QL STRIP: NEGATIVE
LYMPHOCYTES NFR BLD: 0.84 K/UL (ref 1.1–3.7)
LYMPHOCYTES RELATIVE PERCENT: 21 % (ref 24–44)
MCH RBC QN AUTO: 32.3 PG (ref 26–34)
MCHC RBC AUTO-ENTMCNC: 34.1 G/DL (ref 31–37)
MCV RBC AUTO: 94.8 FL (ref 80–100)
MONOCYTES NFR BLD: 0.36 K/UL (ref 0.1–1.2)
MONOCYTES NFR BLD: 9 % (ref 3–12)
NEUTROPHILS NFR BLD: 69 % (ref 36–66)
NEUTS SEG NFR BLD: 2.84 K/UL (ref 1.5–8.1)
NITRITE UR QL STRIP: NEGATIVE
NRBC BLD-RTO: 0 PER 100 WBC
PH UR STRIP: 6 [PH] (ref 5–8)
PLATELET # BLD AUTO: ABNORMAL K/UL (ref 150–450)
PLATELET, FLUORESCENCE: 52 K/UL (ref 150–450)
PLATELETS.RETICULATED NFR BLD AUTO: 3.7 % (ref 1.1–10.3)
PMV BLD AUTO: 11.8 FL (ref 8–13.5)
POTASSIUM SERPL-SCNC: 3.6 MMOL/L (ref 3.7–5.3)
PROT SERPL-MCNC: 6.1 G/DL (ref 6.6–8.7)
PROT UR STRIP-MCNC: NEGATIVE MG/DL
RBC # BLD AUTO: 3.62 M/UL (ref 4.21–5.77)
RBC #/AREA URNS HPF: ABNORMAL /HPF
SODIUM SERPL-SCNC: 139 MMOL/L (ref 136–145)
SP GR UR STRIP: 1.03 (ref 1–1.03)
TROPONIN I SERPL HS-MCNC: 20 NG/L (ref 0–22)
TROPONIN I SERPL HS-MCNC: 20 NG/L (ref 0–22)
UROBILINOGEN UR STRIP-ACNC: NORMAL EU/DL (ref 0–1)
WBC #/AREA URNS HPF: ABNORMAL /HPF
WBC OTHER # BLD: 4.1 K/UL (ref 3.5–11)

## 2025-07-08 PROCEDURE — 72125 CT NECK SPINE W/O DYE: CPT

## 2025-07-08 PROCEDURE — 96366 THER/PROPH/DIAG IV INF ADDON: CPT

## 2025-07-08 PROCEDURE — 81001 URINALYSIS AUTO W/SCOPE: CPT

## 2025-07-08 PROCEDURE — 2500000003 HC RX 250 WO HCPCS: Performed by: STUDENT IN AN ORGANIZED HEALTH CARE EDUCATION/TRAINING PROGRAM

## 2025-07-08 PROCEDURE — 82947 ASSAY GLUCOSE BLOOD QUANT: CPT

## 2025-07-08 PROCEDURE — 87154 CUL TYP ID BLD PTHGN 6+ TRGT: CPT

## 2025-07-08 PROCEDURE — 93005 ELECTROCARDIOGRAM TRACING: CPT | Performed by: STUDENT IN AN ORGANIZED HEALTH CARE EDUCATION/TRAINING PROGRAM

## 2025-07-08 PROCEDURE — 85025 COMPLETE CBC W/AUTO DIFF WBC: CPT

## 2025-07-08 PROCEDURE — 84484 ASSAY OF TROPONIN QUANT: CPT

## 2025-07-08 PROCEDURE — 99285 EMERGENCY DEPT VISIT HI MDM: CPT

## 2025-07-08 PROCEDURE — 2580000003 HC RX 258: Performed by: EMERGENCY MEDICINE

## 2025-07-08 PROCEDURE — 2580000003 HC RX 258: Performed by: STUDENT IN AN ORGANIZED HEALTH CARE EDUCATION/TRAINING PROGRAM

## 2025-07-08 PROCEDURE — 73030 X-RAY EXAM OF SHOULDER: CPT

## 2025-07-08 PROCEDURE — 73080 X-RAY EXAM OF ELBOW: CPT

## 2025-07-08 PROCEDURE — 70450 CT HEAD/BRAIN W/O DYE: CPT

## 2025-07-08 PROCEDURE — 80053 COMPREHEN METABOLIC PANEL: CPT

## 2025-07-08 PROCEDURE — 70496 CT ANGIOGRAPHY HEAD: CPT

## 2025-07-08 PROCEDURE — 71045 X-RAY EXAM CHEST 1 VIEW: CPT

## 2025-07-08 PROCEDURE — 6360000004 HC RX CONTRAST MEDICATION: Performed by: STUDENT IN AN ORGANIZED HEALTH CARE EDUCATION/TRAINING PROGRAM

## 2025-07-08 PROCEDURE — 96368 THER/DIAG CONCURRENT INF: CPT

## 2025-07-08 PROCEDURE — 70498 CT ANGIOGRAPHY NECK: CPT

## 2025-07-08 PROCEDURE — 83605 ASSAY OF LACTIC ACID: CPT

## 2025-07-08 PROCEDURE — 87205 SMEAR GRAM STAIN: CPT

## 2025-07-08 PROCEDURE — 6370000000 HC RX 637 (ALT 250 FOR IP): Performed by: EMERGENCY MEDICINE

## 2025-07-08 PROCEDURE — 87086 URINE CULTURE/COLONY COUNT: CPT

## 2025-07-08 PROCEDURE — 96365 THER/PROPH/DIAG IV INF INIT: CPT

## 2025-07-08 PROCEDURE — 36415 COLL VENOUS BLD VENIPUNCTURE: CPT

## 2025-07-08 PROCEDURE — 6360000002 HC RX W HCPCS: Performed by: STUDENT IN AN ORGANIZED HEALTH CARE EDUCATION/TRAINING PROGRAM

## 2025-07-08 PROCEDURE — 87077 CULTURE AEROBIC IDENTIFY: CPT

## 2025-07-08 PROCEDURE — 87040 BLOOD CULTURE FOR BACTERIA: CPT

## 2025-07-08 PROCEDURE — 4A03X5D MEASUREMENT OF ARTERIAL FLOW, INTRACRANIAL, EXTERNAL APPROACH: ICD-10-PCS | Performed by: RADIOLOGY

## 2025-07-08 RX ORDER — SODIUM CHLORIDE 9 MG/ML
INJECTION, SOLUTION INTRAVENOUS CONTINUOUS
Status: DISCONTINUED | OUTPATIENT
Start: 2025-07-08 | End: 2025-07-11

## 2025-07-08 RX ORDER — 0.9 % SODIUM CHLORIDE 0.9 %
1000 INTRAVENOUS SOLUTION INTRAVENOUS ONCE
Status: COMPLETED | OUTPATIENT
Start: 2025-07-08 | End: 2025-07-08

## 2025-07-08 RX ORDER — IOPAMIDOL 755 MG/ML
75 INJECTION, SOLUTION INTRAVASCULAR
Status: COMPLETED | OUTPATIENT
Start: 2025-07-08 | End: 2025-07-08

## 2025-07-08 RX ORDER — ASPIRIN 81 MG/1
324 TABLET, CHEWABLE ORAL ONCE
Status: COMPLETED | OUTPATIENT
Start: 2025-07-08 | End: 2025-07-08

## 2025-07-08 RX ORDER — 0.9 % SODIUM CHLORIDE 0.9 %
100 INTRAVENOUS SOLUTION INTRAVENOUS ONCE
Status: COMPLETED | OUTPATIENT
Start: 2025-07-08 | End: 2025-07-08

## 2025-07-08 RX ORDER — SODIUM CHLORIDE 0.9 % (FLUSH) 0.9 %
10 SYRINGE (ML) INJECTION PRN
Status: DISCONTINUED | OUTPATIENT
Start: 2025-07-08 | End: 2025-07-12 | Stop reason: HOSPADM

## 2025-07-08 RX ORDER — MAGNESIUM SULFATE HEPTAHYDRATE 40 MG/ML
2000 INJECTION, SOLUTION INTRAVENOUS ONCE
Status: COMPLETED | OUTPATIENT
Start: 2025-07-08 | End: 2025-07-08

## 2025-07-08 RX ADMIN — SODIUM CHLORIDE, PRESERVATIVE FREE 10 ML: 5 INJECTION INTRAVENOUS at 20:46

## 2025-07-08 RX ADMIN — SODIUM CHLORIDE: 0.9 INJECTION, SOLUTION INTRAVENOUS at 22:34

## 2025-07-08 RX ADMIN — VANCOMYCIN HYDROCHLORIDE 2250 MG: 1 INJECTION, POWDER, LYOPHILIZED, FOR SOLUTION INTRAVENOUS at 22:21

## 2025-07-08 RX ADMIN — PIPERACILLIN AND TAZOBACTAM 4500 MG: 4; .5 INJECTION, POWDER, LYOPHILIZED, FOR SOLUTION INTRAVENOUS at 21:30

## 2025-07-08 RX ADMIN — IOPAMIDOL 75 ML: 755 INJECTION, SOLUTION INTRAVENOUS at 20:47

## 2025-07-08 RX ADMIN — MAGNESIUM SULFATE HEPTAHYDRATE 2000 MG: 40 INJECTION, SOLUTION INTRAVENOUS at 21:32

## 2025-07-08 RX ADMIN — ASPIRIN 324 MG: 81 TABLET, CHEWABLE ORAL at 23:19

## 2025-07-08 RX ADMIN — SODIUM CHLORIDE 1000 ML: 0.9 INJECTION, SOLUTION INTRAVENOUS at 21:26

## 2025-07-08 RX ADMIN — SODIUM CHLORIDE 100 ML: 9 INJECTION, SOLUTION INTRAVENOUS at 20:47

## 2025-07-08 ASSESSMENT — LIFESTYLE VARIABLES
HOW MANY STANDARD DRINKS CONTAINING ALCOHOL DO YOU HAVE ON A TYPICAL DAY: PATIENT DOES NOT DRINK
HOW OFTEN DO YOU HAVE A DRINK CONTAINING ALCOHOL: NEVER

## 2025-07-08 ASSESSMENT — PAIN - FUNCTIONAL ASSESSMENT: PAIN_FUNCTIONAL_ASSESSMENT: 0-10

## 2025-07-08 ASSESSMENT — PAIN SCALES - GENERAL: PAINLEVEL_OUTOF10: 1

## 2025-07-08 NOTE — ED NOTES
Mode of arrival (squad #, walk in, police, etc) : EMS        Chief complaint(s): Fall        Arrival Note (brief scenario, treatment PTA, etc).: Pt arrived to the ED from home via EMS for multiple falls. Pt wife states that patient had an unwitnessed fall in the driveway and was found by neighbors. Pt wife and son helped pt ambulate back in to house but once trying to go up the steps patient was weak and fell along with LOC. Pt wife states that she tried to catch him but was unable but states he does not think he hit his head. Pt denies head pain and states that he is only having pain to his left elbow. Pt wife denies him being on blood thinners. Pt normally sleeps a lot and is fatigued and normally he takes multiple naps throughout the day. Pt normally is able to ambulate without difficulty and does not normally fall. Pt is also normally have a delayed response in talking.

## 2025-07-08 NOTE — ED PROVIDER NOTES
HPI for ROS.       PHYSICAL EXAM      INITIAL VITALS:   BP (!) 123/57   Pulse 58   Temp 98.6 °F (37 °C)   Resp 17   Ht 1.727 m (5' 8\")   Wt 87.1 kg (192 lb)   SpO2 92%   BMI 29.19 kg/m²     Physical Exam  Vitals and nursing note reviewed.   Constitutional:       Appearance: Normal appearance.   HENT:      Head: Normocephalic and atraumatic.      Right Ear: Tympanic membrane, ear canal and external ear normal.      Left Ear: Tympanic membrane, ear canal and external ear normal.      Nose: Nose normal.      Mouth/Throat:      Mouth: Mucous membranes are moist.      Pharynx: Oropharynx is clear.   Eyes:      Extraocular Movements: Extraocular movements intact.      Conjunctiva/sclera: Conjunctivae normal.      Pupils: Pupils are equal, round, and reactive to light.   Cardiovascular:      Rate and Rhythm: Normal rate and regular rhythm.      Pulses: Normal pulses.      Heart sounds: Normal heart sounds.   Pulmonary:      Effort: Pulmonary effort is normal.      Breath sounds: Normal breath sounds.   Abdominal:      Palpations: Abdomen is soft.   Musculoskeletal:         General: Tenderness (Tenderness over areas of abrasion, no tenderness of the underlying joints) present. Normal range of motion.      Cervical back: Normal range of motion and neck supple. No rigidity or tenderness.   Skin:     General: Skin is warm.      Capillary Refill: Capillary refill takes less than 2 seconds.      Findings: Lesion (Abrasion over left posterior shoulder, left elbow) present.   Neurological:      Mental Status: He is alert and oriented to person, place, and time. Mental status is at baseline.      Comments: At baseline for speech per his wife   Psychiatric:         Mood and Affect: Mood normal.         Behavior: Behavior normal.         Thought Content: Thought content normal.         Judgment: Judgment normal.       DDX/DIAGNOSTIC RESULTS / EMERGENCY DEPARTMENT COURSE / MDM     Medical Decision Making  74-year-old gentleman  lower back pain (improved today) [CB]   1928 BP improved while in room with continued IVF from EMS (not pressure bagged). [CB]   1938 Hemoglobin Quant(!): 11.7  Decreased by 1.5 [CB]   1951 Troponin, High Sensitivity: 20  Reviewed [CB]   1951 Creatinine(!): 1.5  Reviewed, previously 1.3 4 wks ago. [CB]   2022 After evaluation by attending, stroke alert called, LNW this AM (had a headache). NIH 4 for baseline [CB]   2055 DW CRC ct brain neg for bleed [WM]   2056 I do not think he is a candidate for TNK because it is unclear when his last known well was.  He was with family today but he states he had a headache this morning and was not feeling well this morning.  I think he is out of the window for TNK.  The patient denies any abnormal weakness or facial droop or change in his speech and the family states that this is his normal speech.  I consulted the stroke team [WM]   2103 DW telestroke phyisician Dr Kate  Give asa 325 mg   Admit for neuro eval, mri in morning   [WM]   2112 CT HEAD WO CONTRAST  IMPRESSION:  No acute intracranial abnormality. [CB]   2130 CTA HEAD NECK W CONTRAST  IMPRESSION:  1. No significant stenosis of the cervical carotid or vertebral arteries.  2. No significant stenosis or large vessel occlusion of the intracranial arteries.  3. 3.5 mm aneurysm at the bifurcation of an azygous A 2 segment. [CB]   2140 CT CERVICAL SPINE WO CONTRAST  IMPRESSION:  1. No acute abnormality of the cervical spine. [CB]   2147 Updated family on findings of aneurysm, plan to discuss possible transfer with Telestroke team. [CB]   2155 Spoke with Dr. Umana, Neuro/Telestroke Re: CTA head & neck and the aneurysm. Recommended for transfer to [facility] for continued care under their neuro team. [facility] Access to contact the Neurology team. [CB]   2158 Lactic Acid: 1.9  WNL [CB]   2158 Troponin, High Sensitivity: 20  Stable [CB]   2224 DW Dr Goins on access center neuro he also accepts for admission [WM]    2341 XR CHEST PORTABLE     IMPRESSION:  1. No acute findings.  2. Cardiomegaly and asymmetric elevation of the right hemidiaphragm. [CB]      ED Course User Index  [CB] Saskia Meza MD  [WM] Liu Vazquez MD       PROCEDURES:  N/A    CONSULTS:  PHARMACY TO DOSE VANCOMYCIN    CRITICAL CARE:  There was significant risk of life threatening deterioration of patient's condition requiring my direct management. Critical care time 0 minutes, excluding any documented procedures.    FINAL IMPRESSION      1. Fall, initial encounter    2. Acute hypotension    3. Symptoms of cerebrovascular accident    4. Aneurysm          DISPOSITION / PLAN     DISPOSITION Decision To Transfer 07/09/2025 12:36:05 AM   DISPOSITION CONDITION Stable     Awaiting transport to [facility] once inpatient bed available. Stable at time of hand off to Dr. Ennis.    PATIENT REFERRED TO:  No follow-up provider specified.    DISCHARGE MEDICATIONS:  New Prescriptions    No medications on file       Saskia Meza MD  Emergency Medicine Resident    (Please note that portions of this note were completed with a voice recognition program.  Efforts were made to edit the dictations but occasionally words are mis-transcribed.)

## 2025-07-09 PROBLEM — I67.1 CEREBRAL ANEURYSM: Status: ACTIVE | Noted: 2025-07-09

## 2025-07-09 PROBLEM — R29.90 STROKE-LIKE SYMPTOMS: Status: ACTIVE | Noted: 2025-07-09
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
## 2025-07-09 PROBLEM — I72.9 ANEURYSM: Status: ACTIVE | Noted: 2025-07-09

## 2025-07-09 LAB
BASOPHILS # BLD: <0.03 K/UL (ref 0–0.2)
BASOPHILS NFR BLD: 0 % (ref 0–2)
CREAT SERPL-MCNC: 1.2 MG/DL (ref 0.7–1.2)
EKG ATRIAL RATE: 67 BPM
EKG P AXIS: 56 DEGREES
EKG P-R INTERVAL: 206 MS
EKG Q-T INTERVAL: 510 MS
EKG QRS DURATION: 148 MS
EKG QTC CALCULATION (BAZETT): 538 MS
EKG R AXIS: -8 DEGREES
EKG T AXIS: 42 DEGREES
EKG VENTRICULAR RATE: 67 BPM
EOSINOPHIL # BLD: <0.03 K/UL (ref 0–0.44)
EOSINOPHILS RELATIVE PERCENT: 0 % (ref 1–4)
ERYTHROCYTE [DISTWIDTH] IN BLOOD BY AUTOMATED COUNT: 16.7 % (ref 11.8–14.4)
FOLATE SERPL-MCNC: 7.2 NG/ML (ref 4.8–24.2)
GFR, ESTIMATED: 64 ML/MIN/1.73M2
GLUCOSE BLD-MCNC: 128 MG/DL (ref 75–110)
GLUCOSE BLD-MCNC: 174 MG/DL (ref 75–110)
HCT VFR BLD AUTO: 35.9 % (ref 40.7–50.3)
HCV AB SERPL QL IA: NONREACTIVE
HGB BLD-MCNC: 12.3 G/DL (ref 13–17)
HIV 1+2 AB+HIV1 P24 AG SERPL QL IA: NONREACTIVE
IMM GRANULOCYTES # BLD AUTO: <0.03 K/UL (ref 0–0.3)
IMM GRANULOCYTES NFR BLD: 0 %
IMM RETICS NFR: 5.6 % (ref 2.7–18.3)
LYMPHOCYTES NFR BLD: 0.86 K/UL (ref 1.1–3.7)
LYMPHOCYTES RELATIVE PERCENT: 19 % (ref 24–43)
MCH RBC QN AUTO: 33.6 PG (ref 25.2–33.5)
MCHC RBC AUTO-ENTMCNC: 34.3 G/DL (ref 28.4–34.8)
MCV RBC AUTO: 98.1 FL (ref 82.6–102.9)
MICROORGANISM SPEC CULT: NORMAL
MONOCYTES NFR BLD: 0.5 K/UL (ref 0.1–1.2)
MONOCYTES NFR BLD: 11 % (ref 3–12)
NEUTROPHILS NFR BLD: 70 % (ref 36–65)
NEUTS SEG NFR BLD: 3.2 K/UL (ref 1.5–8.1)
NRBC BLD-RTO: 0 PER 100 WBC
PLATELET # BLD AUTO: ABNORMAL K/UL (ref 138–453)
PLATELET, FLUORESCENCE: 47 K/UL (ref 138–453)
PLATELETS.RETICULATED NFR BLD AUTO: 4.1 % (ref 1.1–10.3)
RBC # BLD AUTO: 3.66 M/UL (ref 4.21–5.77)
RBC # BLD: ABNORMAL 10*6/UL
RETIC HEMOGLOBIN: 38.8 PG (ref 28.2–35.7)
RETICS # AUTO: 0.03 M/UL (ref 0.03–0.08)
RETICS/RBC NFR AUTO: 0.7 % (ref 0.5–1.9)
SERVICE CMNT-IMP: NORMAL
SPECIMEN DESCRIPTION: NORMAL
VIT B12 SERPL-MCNC: 930 PG/ML (ref 232–1245)
WBC OTHER # BLD: 4.6 K/UL (ref 3.5–11.3)

## 2025-07-09 PROCEDURE — 82746 ASSAY OF FOLIC ACID SERUM: CPT

## 2025-07-09 PROCEDURE — 2580000003 HC RX 258: Performed by: EMERGENCY MEDICINE

## 2025-07-09 PROCEDURE — 82947 ASSAY GLUCOSE BLOOD QUANT: CPT

## 2025-07-09 PROCEDURE — 99222 1ST HOSP IP/OBS MODERATE 55: CPT | Performed by: PSYCHIATRY & NEUROLOGY

## 2025-07-09 PROCEDURE — 96366 THER/PROPH/DIAG IV INF ADDON: CPT

## 2025-07-09 PROCEDURE — 6370000000 HC RX 637 (ALT 250 FOR IP)

## 2025-07-09 PROCEDURE — 87389 HIV-1 AG W/HIV-1&-2 AB AG IA: CPT

## 2025-07-09 PROCEDURE — 82607 VITAMIN B-12: CPT

## 2025-07-09 PROCEDURE — 92523 SPEECH SOUND LANG COMPREHEN: CPT

## 2025-07-09 PROCEDURE — 36415 COLL VENOUS BLD VENIPUNCTURE: CPT

## 2025-07-09 PROCEDURE — 85025 COMPLETE CBC W/AUTO DIFF WBC: CPT

## 2025-07-09 PROCEDURE — 93010 ELECTROCARDIOGRAM REPORT: CPT | Performed by: INTERNAL MEDICINE

## 2025-07-09 PROCEDURE — 99223 1ST HOSP IP/OBS HIGH 75: CPT

## 2025-07-09 PROCEDURE — 86803 HEPATITIS C AB TEST: CPT

## 2025-07-09 PROCEDURE — 92610 EVALUATE SWALLOWING FUNCTION: CPT

## 2025-07-09 PROCEDURE — 82565 ASSAY OF CREATININE: CPT

## 2025-07-09 PROCEDURE — 85055 RETICULATED PLATELET ASSAY: CPT

## 2025-07-09 PROCEDURE — 2060000000 HC ICU INTERMEDIATE R&B

## 2025-07-09 PROCEDURE — 85045 AUTOMATED RETICULOCYTE COUNT: CPT

## 2025-07-09 PROCEDURE — 2580000003 HC RX 258: Performed by: STUDENT IN AN ORGANIZED HEALTH CARE EDUCATION/TRAINING PROGRAM

## 2025-07-09 PROCEDURE — 6360000002 HC RX W HCPCS: Performed by: STUDENT IN AN ORGANIZED HEALTH CARE EDUCATION/TRAINING PROGRAM

## 2025-07-09 PROCEDURE — 99223 1ST HOSP IP/OBS HIGH 75: CPT | Performed by: INTERNAL MEDICINE

## 2025-07-09 RX ORDER — POLYETHYLENE GLYCOL 3350 17 G/17G
17 POWDER, FOR SOLUTION ORAL DAILY PRN
Status: DISCONTINUED | OUTPATIENT
Start: 2025-07-09 | End: 2025-07-12 | Stop reason: HOSPADM

## 2025-07-09 RX ORDER — ASPIRIN 81 MG/1
81 TABLET, CHEWABLE ORAL DAILY
Status: DISCONTINUED | OUTPATIENT
Start: 2025-07-09 | End: 2025-07-10

## 2025-07-09 RX ORDER — ONDANSETRON 4 MG/1
4 TABLET, ORALLY DISINTEGRATING ORAL EVERY 8 HOURS PRN
Status: DISCONTINUED | OUTPATIENT
Start: 2025-07-09 | End: 2025-07-09

## 2025-07-09 RX ORDER — ONDANSETRON 2 MG/ML
4 INJECTION INTRAMUSCULAR; INTRAVENOUS EVERY 6 HOURS PRN
Status: DISCONTINUED | OUTPATIENT
Start: 2025-07-09 | End: 2025-07-09

## 2025-07-09 RX ORDER — LANOLIN ALCOHOL/MO/W.PET/CERES
400 CREAM (GRAM) TOPICAL DAILY
COMMUNITY

## 2025-07-09 RX ORDER — ATORVASTATIN CALCIUM 80 MG/1
80 TABLET, FILM COATED ORAL NIGHTLY
Status: DISCONTINUED | OUTPATIENT
Start: 2025-07-09 | End: 2025-07-12 | Stop reason: HOSPADM

## 2025-07-09 RX ORDER — SODIUM CHLORIDE 0.9 % (FLUSH) 0.9 %
5-40 SYRINGE (ML) INJECTION EVERY 12 HOURS SCHEDULED
Status: DISCONTINUED | OUTPATIENT
Start: 2025-07-09 | End: 2025-07-12 | Stop reason: HOSPADM

## 2025-07-09 RX ORDER — SODIUM CHLORIDE 9 MG/ML
INJECTION, SOLUTION INTRAVENOUS PRN
Status: DISCONTINUED | OUTPATIENT
Start: 2025-07-09 | End: 2025-07-12 | Stop reason: HOSPADM

## 2025-07-09 RX ORDER — ASPIRIN 300 MG/1
300 SUPPOSITORY RECTAL DAILY
Status: DISCONTINUED | OUTPATIENT
Start: 2025-07-09 | End: 2025-07-10

## 2025-07-09 RX ORDER — ENOXAPARIN SODIUM 100 MG/ML
40 INJECTION SUBCUTANEOUS DAILY
Status: DISCONTINUED | OUTPATIENT
Start: 2025-07-09 | End: 2025-07-10

## 2025-07-09 RX ORDER — DEXTROSE MONOHYDRATE 100 MG/ML
INJECTION, SOLUTION INTRAVENOUS CONTINUOUS PRN
Status: DISCONTINUED | OUTPATIENT
Start: 2025-07-09 | End: 2025-07-12 | Stop reason: HOSPADM

## 2025-07-09 RX ORDER — SODIUM CHLORIDE 0.9 % (FLUSH) 0.9 %
5-40 SYRINGE (ML) INJECTION PRN
Status: DISCONTINUED | OUTPATIENT
Start: 2025-07-09 | End: 2025-07-12 | Stop reason: HOSPADM

## 2025-07-09 RX ORDER — INSULIN LISPRO 100 [IU]/ML
0-4 INJECTION, SOLUTION INTRAVENOUS; SUBCUTANEOUS
Status: DISCONTINUED | OUTPATIENT
Start: 2025-07-09 | End: 2025-07-12 | Stop reason: HOSPADM

## 2025-07-09 RX ADMIN — PIPERACILLIN AND TAZOBACTAM 3375 MG: 3; .375 INJECTION, POWDER, LYOPHILIZED, FOR SOLUTION INTRAVENOUS at 05:53

## 2025-07-09 RX ADMIN — SODIUM CHLORIDE: 0.9 INJECTION, SOLUTION INTRAVENOUS at 10:01

## 2025-07-09 RX ADMIN — POTASSIUM BICARBONATE 20 MEQ: 782 TABLET, EFFERVESCENT ORAL at 14:15

## 2025-07-09 RX ADMIN — ATORVASTATIN CALCIUM 80 MG: 80 TABLET, FILM COATED ORAL at 20:31

## 2025-07-09 NOTE — PLAN OF CARE
Problem: Safety - Adult  Goal: Free from fall injury  Outcome: Progressing     Problem: Chronic Conditions and Co-morbidities  Goal: Patient's chronic conditions and co-morbidity symptoms are monitored and maintained or improved  Outcome: Progressing  Flowsheets (Taken 7/9/2025 1245)  Care Plan - Patient's Chronic Conditions and Co-Morbidity Symptoms are Monitored and Maintained or Improved:   Monitor and assess patient's chronic conditions and comorbid symptoms for stability, deterioration, or improvement   Collaborate with multidisciplinary team to address chronic and comorbid conditions and prevent exacerbation or deterioration   Update acute care plan with appropriate goals if chronic or comorbid symptoms are exacerbated and prevent overall improvement and discharge     Problem: Discharge Planning  Goal: Discharge to home or other facility with appropriate resources  Outcome: Progressing  Flowsheets (Taken 7/9/2025 1245)  Discharge to home or other facility with appropriate resources:   Identify barriers to discharge with patient and caregiver   Arrange for needed discharge resources and transportation as appropriate   Identify discharge learning needs (meds, wound care, etc)   Arrange for interpreters to assist at discharge as needed   Refer to discharge planning if patient needs post-hospital services based on physician order or complex needs related to functional status, cognitive ability or social support system     Problem: Skin/Tissue Integrity  Goal: Skin integrity remains intact  Description: 1.  Monitor for areas of redness and/or skin breakdown  2.  Assess vascular access sites hourly  3.  Every 4-6 hours minimum:  Change oxygen saturation probe site  4.  Every 4-6 hours:  If on nasal continuous positive airway pressure, respiratory therapy assess nares and determine need for appliance change or resting period  Outcome: Progressing  Flowsheets (Taken 7/9/2025 1245)  Skin Integrity Remains Intact:  Monitor for areas of redness and/or skin breakdown     Problem: ABCDS Injury Assessment  Goal: Absence of physical injury  Outcome: Progressing     Problem: Neurosensory - Adult  Goal: Achieves stable or improved neurological status  Outcome: Progressing  Flowsheets (Taken 7/9/2025 1245)  Achieves stable or improved neurological status: Assess for and report changes in neurological status     Problem: Cardiovascular - Adult  Goal: Maintains optimal cardiac output and hemodynamic stability  Outcome: Progressing  Flowsheets (Taken 7/9/2025 1245)  Maintains optimal cardiac output and hemodynamic stability:   Monitor blood pressure and heart rate   Assess for signs of decreased cardiac output   Monitor urine output and notify Licensed Independent Practitioner for values outside of normal range   Administer fluid and/or volume expanders as ordered     Problem: Cardiovascular - Adult  Goal: Absence of cardiac dysrhythmias or at baseline  Outcome: Progressing  Flowsheets (Taken 7/9/2025 1245)  Absence of cardiac dysrhythmias or at baseline:   Monitor cardiac rate and rhythm   Assess for signs of decreased cardiac output   Administer antiarrhythmia medication and electrolyte replacement as ordered     Problem: Musculoskeletal - Adult  Goal: Return mobility to safest level of function  Outcome: Progressing  Flowsheets (Taken 7/9/2025 1245)  Return Mobility to Safest Level of Function: Assess patient stability and activity tolerance for standing, transferring and ambulating with or without assistive devices

## 2025-07-09 NOTE — ED PROVIDER NOTES
EMERGENCY DEPARTMENT ENCOUNTER   ATTENDING ATTESTATION     Pt Name: Carlos Enrique Padron  MRN: [id number]  Birthdate 1950  Date of evaluation: 7/8/25       Carlos Enrique Padron is a 74 y.o. male who presents with Fall, Elbow Pain (Left), and Abrasion (Left shoulder)      MDM:   He had a fall when he was taking the garbage out.    When I examined the patient he is a little slow to respond to questions but is able to answer my questions appropriately.  He has a little bit of slurring of some words intermittently but family states this is his normal speech.  I see a very mild amount of left-sided facial droop that he states he has never had before.  When I asked him to lift up his arms he is able to lift his right arm quicker than his left arm but he does have an injury to his left shoulder.  However this occurs also in his left leg he has some slow elevation in his left leg compared to his right.  He has good strength against gravity.  NIH stroke scale is a 4.  He has an abnormal finger-to-nose with his left arm.  I am concerned he may have had a stroke today.  He states he had a headache this morning so it is hard to say exactly when his last known well was.  He is got a history of rheumatoid arthritis.    Vitals Reviewed:    Vitals:    07/08/25 1911 07/08/25 1915 07/08/25 2010 07/08/25 2015   BP: (S) (!) 77/49 96/71 (!) 112/48 (!) 129/47   Pulse:  65 72 73   Resp:  16 20 12   Temp:       SpO2:  99% 99%    Weight:       Height:           Initial Pain Score: Pain Level: 1 (07/08/25 1857)  Last Pain Score: Pain Level: 1 (07/08/25 1857)      I personally saw and examined the patient. I have reviewed and agree with the resident's findings including all diagnostic interpretations, and treatment plans as written. I was present for the key portions of any procedures performed.    Liu Vazquez MD  Attending Emergency Physician            Liu Vazquez MD  07/08/25 2024  I spoke with the telestroke physician Dr. Zarco.   Recommended aspirin IV fluids MRI brain tomorrow neuro evaluation tomorrow.  Not a candidate for TNK or TALIA.  At this time.  Please call back if the CTA has any critical results.     Liu Vazquez MD  07/08/25 0432

## 2025-07-09 NOTE — PROGRESS NOTES
Facility/Department: [facility]   CLINICAL BEDSIDE SWALLOW EVALUATION    NAME: Carlos Enrique Padron  : 1950  MRN: [id number]    ADMISSION DATE: 2025  ADMITTING DIAGNOSIS: has Essential hypertension; Mixed hyperlipidemia; Rheumatoid arthritis involving multiple sites with positive rheumatoid factor (HCC); Vitamin D deficiency; Type 2 diabetes mellitus without complication, without long-term current use of insulin (HCC); Diverticulosis; Adenomatous polyp; Anemia; Monocytosis; Elevated creatine kinase; Thrombocytopenia; Stroke-like symptoms; Aneurysm; and Fall on their problem list.    Date of Eval: 2025    Evaluating Therapist: PEG Heredia    Recommended Diet and Intervention  Solid Consistency Recommendation: Regular  Liquid Consistency Recommendation: Thin  Recommended Form of Meds: PO    Compensatory Swallowing Strategies  Eat/Feed slowly;Upright as possible for all oral intake;Small bites/sips    Recent Chest Xray/CT of Chest:   Results for orders placed during the hospital encounter of 25    XR CHEST PORTABLE    Impression  1. No acute findings.  2. Cardiomegaly and asymmetric elevation of the right hemidiaphragm.    Reason for Referral  Carlos Enrique Padron was referred for a bedside swallow evaluation to assess the efficiency of his swallow function, identify signs and symptoms of aspiration and make recommendations regarding safe dietary consistencies, effective compensatory strategies, and safe eating environment.    Primary Complaint  Per IM H&P: 74-year-old male presented with multiple falls. Initial fall while taking trash out fell on the grass unsure if it was a syncopal event. Helped by his family members to get back into the house, another syncopal event. Denies hitting his head. Came into the ED. Patient does have slow speech at baseline. Stroke alert was called in the ED. Imaging was done, telestroke recommended neuro evaluation and transferred to [facility] for  evaluation and aneurysm. But due to unavailability, patient will be monitored here with close neurology follow-up.     Current Diet level:  Current Diet : Regular  Current Liquid Diet : Thin    Pain:  Patient does not c/o pain    Impression  Patient presents with probable safe swallow for Regular diet with thin liquids as evidenced by no overt s/s of aspiration noted with consistencies tested. Pt. demo functional dentition, mastication, and oral clearance. No oral holding/anterior spill noted. Recommend small sips and bites, only feed when alert and awake and upright at 90 degrees for all PO intake. Recommend close monitoring for overt/clinical s/s of aspiration and notify ST should they occur.     Dysphagia Diagnosis: Swallow function appears [facility]  Dysphagia Outcome Severity Scale: Level 7: Normal in all situations     Treatment Plan  Requires SLP Intervention: No     D/C Recommendations: No follow up therapy recommended post discharge    General  Behavior/Cognition: Alert;Cooperative  Respiratory Status: Room air  O2 Device: None (Room air)  Baseline Vocal Quality: Normal    Social Functional  Social/Functional History  Lives With: Spouse  Active : Yes  Occupation: Retired  Type of Occupation: Glass factory    Vision/Hearing  Vision: Impaired  Vision Exceptions: Wears glasses at all times  Hearing: Impaired  Hearing Exceptions: Hard of hearing/hearing concerns    Oral Motor  Oral Motor: Within Functional Limits  Oral Hygiene: Moist;Clean  Dentition: Natural    Oral Phase  Oral Phase: Within Functional Limits (Pt. demo functional dentition, mastication, and oral clearance. No oral holding/anterior spill noted).    Indicators of Pharyngeal Dysfunction  No overt s/s of aspiration/penetration noted with consistencies tested.    Prognosis  Consulted and agree with results and recommendations: Patient;Family member  Family member consulted: Wife, daughter    Education  Education Topic: Results/ recommendations of

## 2025-07-09 NOTE — CONSULTS
Today's Date: 7/9/2025  Patient Name: Carlos Enrique Padron  Date of admission: 7/8/2025  6:53 PM  Patient's age: 74 y.o., 1950  Admission Dx: Aneurysm [I72.9]  Stroke-like symptoms [R29.90]  Symptoms of cerebrovascular accident [R09.89]  Fall, initial encounter [W19.XXXA]  Acute hypotension [I95.9]  Cerebral aneurysm [I67.1]    Reason for Consult: management recommendations  Requesting Physician: Brant Goins MD    CHIEF COMPLAINT: Falls, thrombocytopenia    History Obtained From:  patient    HISTORY OF PRESENT ILLNESS:      The patient is a 74 y.o.   male who is admitted to the hospital after a fall and syncopal episode.  The patient has history of rheumatoid arthritis and chronic thrombocytopenia and he follows with rheumatology Dr. Jacobs.      Past Medical History:   has a past medical history of Borderline diabetic, Cerebral aneurysm, Chronic renal disease, stage III (HCC) [031687], History of colonoscopy, Hyperlipidemia, Hypertension, Osteoarthritis, RA (rheumatoid arthritis) ([facility]), and Type 2 diabetes mellitus without complication ([facility]).    Past Surgical History:   has a past surgical history that includes Tonsillectomy and adenoidectomy; Colonoscopy (N/A, 12/14/2018); and skin biopsy (01/11/2022).     Medications:    Reviewed in Epic     Allergies:  Patient has no known allergies.    Social History:   reports that he has never smoked. He has never used smokeless tobacco. He reports that he does not drink alcohol and does not use drugs.     Family History: family history includes Cancer in his father; Immune Disorder in his mother; Other in his mother.    REVIEW OF SYSTEMS:    Constitutional: No fever or chills. No night sweats, no weight loss   Eyes: No eye discharge, double vision, or eye pain   HEENT: negative for sore mouth, sore throat, hoarseness and voice change   Respiratory: negative for cough , sputum, dyspnea, wheezing, hemoptysis, chest pain   Cardiovascular: negative for chest pain, dyspnea,  palpitations, orthopnea, PND   Gastrointestinal: negative for nausea, vomiting, diarrhea, constipation, abdominal pain, Dysphagia, hematemesis and hematochezia   Genitourinary: negative for frequency, dysuria, nocturia, urinary incontinence, and hematuria   Integument: negative for rash, skin lesions, bruises.   Hematologic/Lymphatic: negative for easy bruising, bleeding, lymphadenopathy, or petechiae   Endocrine: negative for heat or cold intolerance,weight changes, change in bowel habits and hair loss   Musculoskeletal: Significant arthralgia, no arthritis  Neurological: negative for headaches, dizziness, seizures, weakness, numbness    PHYSICAL EXAM:      /69   Pulse 83   Temp 98.8 °F (37.1 °C) (Oral)   Resp 16   Ht 1.727 m (5' 8\")   Wt 87.1 kg (192 lb)   SpO2 98%   BMI 29.19 kg/m²    Temp (24hrs), Av.7 °F (37.1 °C), Min:98.6 °F (37 °C), Max:98.8 °F (37.1 °C)    General appearance - well appearing, no in pain or distress   Mental status - alert and cooperative   Eyes - pupils equal and reactive, extraocular eye movements intact   Ears - bilateral TM's and external ear canals normal   Mouth - mucous membranes moist, pharynx normal without lesions   Neck - supple, no significant adenopathy   Lymphatics - no palpable lymphadenopathy, no hepatosplenomegaly   Chest - clear to auscultation, no wheezes, rales or rhonchi, symmetric air entry   Heart - normal rate, regular rhythm, normal S1, S2, no murmurs  Abdomen - soft, nontender, nondistended, no masses or organomegaly   Neurological - alert, oriented, normal speech, no focal findings or movement disorder noted   Musculoskeletal - no joint tenderness, deformity or swelling   Extremities - peripheral pulses normal, no pedal edema, no clubbing or cyanosis   Skin - normal coloration and turgor, no rashes, no suspicious skin lesions noted ,    DATA:    Labs:   CBC:   Recent Labs     25  1908   WBC 4.1   HGB 11.7*   HCT 34.3*   PLT See Reflexed IPF

## 2025-07-09 NOTE — PROGRESS NOTES
Facility/Department: [facility] PROGRESSIVE CARE  Initial Speech/Language/Cognitive Assessment    NAME: Carlos Enrique Padron  : 1950   MRN: [id number]  ADMISSION DATE: 2025  ADMITTING DIAGNOSIS: has Essential hypertension; Mixed hyperlipidemia; Rheumatoid arthritis involving multiple sites with positive rheumatoid factor (HCC); Vitamin D deficiency; Type 2 diabetes mellitus without complication, without long-term current use of insulin (HCC); Diverticulosis; Adenomatous polyp; Anemia; Monocytosis; Elevated creatine kinase; Thrombocytopenia; Stroke-like symptoms; Aneurysm; and Fall on their problem list.    Date of Eval: 2025   Evaluating Therapist: Nika Guerin, SLP    Diagnosis/Impressions  Pt presents with mild cognitive deficits characterized by difficulties with short-term recall of 3 units, immediate recall of 5 units, verbal reasoning, task insight, and divergent naming. Pt. Wife and daughter at bedside, reporting no changes to cognition and confirming performance on this assessment as baseline. Pt. Reporting baseline level cognition as well. Pt. Presents with no dysarthria (do note reduced rate/impreciseness at times, however per chart review and family this is baseline). No O/M deficits. No further ST recommended at this time as pt. Appears to be at baseline.     RECENT RESULTS  CT OF HEAD/MRI:   Results for orders placed during the hospital encounter of 25    CT HEAD WO CONTRAST  Impression  No acute intracranial abnormality.    The findings were sent to the Radiology Results Communication Center at 8:52  pm on 2025 to be communicated to a licensed caregiver.    Primary Complaint  Per IM H&P: 74-year-old male presented with multiple falls. Initial fall while taking trash out fell on the grass unsure if it was a syncopal event. Helped by his family members to get back into the house, another syncopal event. Denies hitting his head. Came into the ED. Patient does have slow speech at baseline.

## 2025-07-09 NOTE — ED NOTES
Patient informed writer that due to his hx of rheumatoid arthritis, he had an infusion of Remicade last week and was told he could not take aspirin. Writer called pharmacy and spoke with the pharmacist Rico who states if infusion was completed last week patient will be okay to take prescribed 324mg aspirin.

## 2025-07-09 NOTE — PROGRESS NOTES
Writer spoke with MRI technologist who stated there are no more opening for MRIs this evening. Patient's MRI brain will be done tomorrow.

## 2025-07-09 NOTE — PROGRESS NOTES
[facility]   Pharmacy Pharmacokinetic Monitoring Service - Vancomycin     Carlos Enrique Padron is a 74 y.o. male starting on vancomycin therapy for sepsis unknown causes x 7 days. Pharmacy consulted by Saskia Meza MD   for monitoring and adjustment.    Target Concentration: Goal AUC/BRANDIE 400-600 mg*hr/L    Additional Antimicrobials: Zosyn    Pertinent Laboratory Values:   Wt Readings from Last 1 Encounters:   07/08/25 87.1 kg (192 lb)     Temp Readings from Last 1 Encounters:   07/08/25 98.6 °F (37 °C)     Estimated Creatinine Clearance: 48 mL/min (A) (based on SCr of 1.5 mg/dL (H)).  Recent Labs     07/08/25  1908   CREATININE 1.5*   BUN 22   WBC 4.1     Procalcitonin: none    Pertinent Cultures:  Culture Date Source Results   7/8/25 Blood x 2  Urine pending   MRSA Nasal Swab: N/A. Non-respiratory infection.    Plan:  Dosing recommendations based on Bayesian software:  Loading dose: 2250 mg at ~21:00 07/08/2025.  Regimen: 1250 mg IV every 24 hours.  Start time: ~21:00 on 07/09/2025  Exposure target: AUC24 (range) 400-600 mg/L.hr   NYD06-31: 467 mg/L.hr  AUC24,ss: 520 mg/L.hr  Probability of AUC24 > 400: 79 %  Ctrough,ss: 14.6 mg/L  Probability of Ctrough,ss > 20: 21 %    Anticipated AUC of 520 and trough concentration of 14.6 at steady state  Renal labs as indicated   Vancomycin concentration ordered for 07/10 @ 0600.   Pharmacy will continue to monitor patient and adjust therapy as indicated    Thank you for the consult,  parveen whitmore [facility]  7/8/2025 8:36 PM

## 2025-07-09 NOTE — PROGRESS NOTES
Dr. Dorsey notified patient was told not to take aspirin by his RA doctor. Orders to hold for now.

## 2025-07-09 NOTE — VIRTUAL HEALTH
[facility] Stroke and Telestroke Consult for  [facility] EMERGENCY DEPT   Stroke Alert through [facility]    Pt Name: Carlos Enrique Padron  MRN: [id number]  YOB: 1950  Date of evaluation: 7/8/25  Primary Care Physician: Carl Rogers MD  Reason for Evaluation: Stroke evaluation with Phone Consult, Discussion and Review of imaging    Carlos Enrique Padron is a 74 y.o. male with pmh of unclear. Per report, he presents with fall. In ED with physician noticing some L arm ataxia and mild decrease in left leg strength.Also possible dysarthria but family states this is normal. NIH scored as 4.    LKW: Unclear, more than >5 hours, sometime in the morning  NIH:  4 per ED provider    Allergies  has no known allergies.  Medications  Prior to Admission medications    Medication Sig Start Date End Date Taking? Authorizing Provider   simvastatin (ZOCOR) 10 MG tablet TAKE 1 TABLET NIGHTLY 8/7/23   Pola Diego MD   blood glucose monitor strips Test blood sugar once daily, one touch teststrips 7/18/23   Pola Diego MD   JANUVIA 50 MG tablet TAKE 1 TABLET DAILY 4/5/23   Pola Diego MD   triamterene-hydroCHLOROthiazide (DYAZIDE) 37.5-25 MG per capsule TAKE 1 CAPSULE DAILY 2/23/23   Pola Diego MD   losartan (COZAAR) 100 MG tablet TAKE 1 TABLET DAILY 2/23/23   Pola Diego MD   potassium chloride (MICRO-K) 10 MEQ extended release capsule TAKE 1 CAPSULE TWICE A DAY 2/23/23   Pola Diego MD   amLODIPine (NORVASC) 5 MG tablet TAKE 1 TABLET DAILY 2/23/23   Pola Diego MD   atenolol (TENORMIN) 50 MG tablet TAKE ONE AND ONE-HALF TABLETS DAILY 2/23/23   Pola Diego MD   Niacin (VITAMIN B-3 PO) Take by mouth    Provider, MD Jagjit   ONE TOUCH ULTRASOFT LANCETS MISC 1 each by Does not apply route daily 7/2/20   Pola Diego MD   ONETOUCH DELICA LANCETS FINE MISC DX:E11.65 PATIENT TO TEST 2-3 TIMES DAILY 8/14/18   Provider, MD Jagjit   vitamin D (CHOLECALCIFEROL) 1000 UNIT TABS tablet Take  Strain (CARDIA)     Difficulty of Paying Living Expenses: Not hard at all   Food Insecurity: Not on file (11/14/2023)   Transportation Needs: Unknown (11/14/2023)    PRAPARE - Transportation     Lack of Transportation (Medical): Not on file     Lack of Transportation (Non-Medical): No   Physical Activity: Insufficiently Active (11/12/2023)    Exercise Vital Sign     Days of Exercise per Week: 3 days     Minutes of Exercise per Session: 10 min   Stress: Not on file   Social Connections: Not on file   Intimate Partner Violence: Not on file   Housing Stability: Unknown (11/14/2023)    Housing Stability Vital Sign     Unable to Pay for Housing in the Last Year: Not on file     Number of Places Lived in the Last Year: Not on file     Unstable Housing in the Last Year: No     Family History      Problem Relation Age of Onset    Other Mother         mild dysplasia    Immune Disorder Mother     Cancer Father         throat, lung       OBJECTIVE  /64   Pulse 79   Temp 98.6 °F (37 °C)   Resp 17   Ht 1.727 m (5' 8\")   Wt 87.1 kg (192 lb)   SpO2 92%   BMI 29.19 kg/m²     Imaging:  Images were personally reviewed with [facility] and PACS used to review images including:  CT brain without contrast: no hemorrhage, no large hypodensity  CTA imaging: no large vessel occlusion, incidental 3mm aneurysm  Radiology final reads pending.    Assessment    # Fall with left arm ataxia and mild left leg weakness  # Dysarthria, likely baseline  # Incidental 3mm L A2 aneurysm        Differential DDx:  C/f TIA/AIS not ruled out especially given medical history  Consider toxic/metabolic/infectious workup      Recommendations:  NIH 4 per ED provider  Recommend Inpatient Neurology Consult for further assessment and evaluation   Patient presents with stroke-like symptoms but is not an IV thrombolytic candidate due to:out of window  Permissive SBP <220 for now  Aspirin 325 x1  MRI brain  Transfer to [facility] for further eval of  aneurysm  Control of stroke risk factors per primary team  LDL, A1c, TTE, and stroke workup per primary team  Rest of care per primary team        Discussed with Attending physician      At least 35 min of Telemedicine and time in conversation directly with ED staff and physician for the patient who is in imminent and life threatening deterioration without further treatment and evaluation.  This Virtual Visit was conducted with patient's (and/or legal guardian's) consent, to provide telestroke consultation and necessary medical care.  Time spent examining patient, reviewing the images personally, reviewing the chart, perform high complexity decision making and speaking with the nursing staff regarding recommendations      Telestroke: This is a Phone Consult, I have not seen the patient face to face, the telemedicine device was not utilized.  [facility] EMERGENCY DEPT      Roshan Cannon MD  Stroke, Neurocritical Care And/or Neurointervention  [facility]  [facility] Stroke [facility] [place]  Electronically signed 7/9/2025 at 8:12 AM

## 2025-07-09 NOTE — ED NOTES
Report given to NASIM Kim from U.   Report method by phone   The following was reviewed with receiving RN:   Current vital signs:  BP (!) 129/58   Pulse 77   Temp 98.6 °F (37 °C)   Resp 15   Ht 1.727 m (5' 8\")   Wt 87.1 kg (192 lb)   SpO2 97%   BMI 29.19 kg/m²                      Any medication or safety alerts were reviewed. Any pending diagnostics and notifications were also reviewed, as well as any safety concerns or issues, abnormal labs, abnormal imaging, and abnormal assessment findings. Questions were answered.

## 2025-07-09 NOTE — PROGRESS NOTES
Dr. Dorsey notified patient is asking about restarting home medications, specifically Januvia. Per Dr. Dorsey the patient's Januvia needs to be held for now and will be getting sliding scale insulin instead.

## 2025-07-09 NOTE — ED NOTES
Report given to Silva RIBEIRO RN from ED.   Report method in person   The following was reviewed with receiving RN:   Current vital signs:  BP (!) 124/41   Pulse 83   Temp 98.6 °F (37 °C)   Resp 16   Ht 1.727 m (5' 8\")   Wt 87.1 kg (192 lb)   SpO2 98%   BMI 29.19 kg/m²                      Any medication or safety alerts were reviewed. Any pending diagnostics and notifications were also reviewed, as well as any safety concerns or issues, abnormal labs, abnormal imaging, and abnormal assessment findings. Questions were answered.

## 2025-07-09 NOTE — PLAN OF CARE
Please have pt or POA who knows pt medical hx fill out online MRI screening form. Pt will need to be dressed in hospital clothes for this exam. Any questions please call [phone].  Thanks!

## 2025-07-09 NOTE — ED NOTES
Patient has orders for 2x sets blood cultures with lactic acid for 1940, writer attempted 3x to obtain bloodwork pt unable to sit still at this time writer unable to obtain blood cultures. Writer paged ER phleb for BC order, stroke alert called at 2023, phlebotomist informed patient will get CT completed prior to blood culture draws given stroke alert protocol. MD aware.

## 2025-07-09 NOTE — H&P
[facility] Internal Medicine   Jonnie Pena MD; MD Radha Montoya MD, MD , Aisha Dorsey MD    [facility] Internal Medicine   IN-PATIENT SERVICE   [facility]    HISTORY AND PHYSICAL EXAMINATION            Date:   7/9/2025  Patient name:  Carlos Enrique Padron  Date of admission:  7/8/2025  6:53 PM  MRN:   [id number]  Account:  [id number]  YOB: 1950  PCP:    Carl Rogers MD  Room:   2091/2091-01  Code Status:    Full Code    Chief Complaint:     Chief Complaint   Patient presents with    Fall    Elbow Pain     Left    Abrasion     Left shoulder       History Obtained From:     patient, electronic medical record    History of Present Illness:     Carlos Enrique Padron is a 74 y.o. Non- / non  male who presents with Fall, Elbow Pain (Left), and Abrasion (Left shoulder)   and is admitted to the hospital for the management of Stroke-like symptoms.    74-year-old male presented with multiple falls.  Initial fall while taking trash out fell on the grass unsure if it was a syncopal event.  Helped by his family members to get back into the house, another syncopal event.  Denies hitting his head.  Came into the ED.  Patient does have slow speech at baseline.  Stroke alert was called in the ED.  Imaging was done, telestroke recommended neuro evaluation and transferred to [facility] for evaluation and aneurysm.  But due to unavailability, patient will be monitored here with close neurology follow-up.    Past Medical History:     Past Medical History:   Diagnosis Date    Borderline diabetic     Cerebral aneurysm 7/9/2025    Chronic renal disease, stage III (HCC) [662134] 05/31/2022    History of colonoscopy 12/14/2018    multiple polyps,tubular adenoma,diverticulosis    Hyperlipidemia     Hypertension     Osteoarthritis rheumatoid arthritis    6/12    RA (rheumatoid arthritis) (HCC)     Type 2 diabetes mellitus without  complication (HCC)         Past Surgical History:     Past Surgical History:   Procedure Laterality Date    COLONOSCOPY N/A 12/14/2018    multiple polyps,tubular adenoma,diverticulosis    SKIN BIOPSY  01/11/2022    on scalp    TONSILLECTOMY AND ADENOIDECTOMY          Medications Prior to Admission:     Prior to Admission medications    Medication Sig Start Date End Date Taking? Authorizing Provider   magnesium oxide (MAG-OX) 400 (240 Mg) MG tablet Take 1 tablet by mouth daily   Yes Jagjit Edmonds MD   simvastatin (ZOCOR) 10 MG tablet TAKE 1 TABLET NIGHTLY 8/7/23  Yes Pola Diego MD   JANUVIA 50 MG tablet TAKE 1 TABLET DAILY 4/5/23  Yes Pola Diego MD   triamterene-hydroCHLOROthiazide (DYAZIDE) 37.5-25 MG per capsule TAKE 1 CAPSULE DAILY 2/23/23  Yes Pola Diego MD   losartan (COZAAR) 100 MG tablet TAKE 1 TABLET DAILY 2/23/23  Yes Pola Diego MD   potassium chloride (MICRO-K) 10 MEQ extended release capsule TAKE 1 CAPSULE TWICE A DAY 2/23/23  Yes Pola Diego MD   amLODIPine (NORVASC) 5 MG tablet TAKE 1 TABLET DAILY 2/23/23  Yes Pola Diego MD   atenolol (TENORMIN) 50 MG tablet TAKE ONE AND ONE-HALF TABLETS DAILY 2/23/23  Yes Pola Diego MD   Niacin (VITAMIN B-3 PO) Take by mouth   Yes Jagjit Edmonds MD   vitamin D (CHOLECALCIFEROL) 1000 UNIT TABS tablet Take 1 tablet by mouth daily   Yes Jagjit Edmonds MD   folic acid (FOLVITE) 1 MG tablet Take 1 tablet by mouth daily   Yes Jagjit Edmonds MD   methotrexate (RHEUMATREX) 2.5 MG chemo tablet Take 1 tablet by mouth once a week 10 tabs weekly   Yes Jagjit Edmonds MD   inFLIXimab (REMICADE) 100 MG injection Infuse 5 mg/kg intravenously See Admin Instructions Every 8 weeks   Yes Jagjit Edmonds MD   blood glucose monitor strips Test blood sugar once daily, one touch teststrips 7/18/23   Pola Diego MD   ONE TOUCH ULTRASOFT LANCETS MISC 1 each by Does not apply route daily 7/2/20   Pola Diego MD

## 2025-07-09 NOTE — PROGRESS NOTES
Patient admitted to room 2091. Attached to telemetry monitor. Call light within reach and safety precautions in place. Bed alarm on. Assessment to follow.

## 2025-07-10 ENCOUNTER — APPOINTMENT (OUTPATIENT)
Age: 75
End: 2025-07-10
Payer: MEDICARE

## 2025-07-10 ENCOUNTER — APPOINTMENT (OUTPATIENT)
Dept: GENERAL RADIOLOGY | Age: 75
End: 2025-07-10
Payer: MEDICARE

## 2025-07-10 ENCOUNTER — APPOINTMENT (OUTPATIENT)
Dept: MRI IMAGING | Age: 75
End: 2025-07-10
Payer: MEDICARE

## 2025-07-10 LAB
ANION GAP SERPL CALCULATED.3IONS-SCNC: 10 MMOL/L (ref 9–16)
B PARAP IS1001 DNA NPH QL NAA+NON-PROBE: NOT DETECTED
B PERT DNA SPEC QL NAA+PROBE: NOT DETECTED
BNP SERPL-MCNC: 864 PG/ML (ref 0–300)
BUN SERPL-MCNC: 10 MG/DL (ref 8–23)
C PNEUM DNA NPH QL NAA+NON-PROBE: NOT DETECTED
CALCIUM SERPL-MCNC: 7.7 MG/DL (ref 8.6–10.4)
CHLORIDE SERPL-SCNC: 108 MMOL/L (ref 98–107)
CHOLEST SERPL-MCNC: 80 MG/DL (ref 0–199)
CHOLESTEROL/HDL RATIO: 3.1
CO2 SERPL-SCNC: 20 MMOL/L (ref 20–31)
CREAT SERPL-MCNC: 0.9 MG/DL (ref 0.7–1.2)
DATE LAST DOSE: NORMAL
ECHO AO ROOT DIAM: 3.5 CM
ECHO AO ROOT INDEX: 1.74 CM/M2
ECHO AV AREA PEAK VELOCITY: 2.2 CM2
ECHO AV AREA VTI: 2.7 CM2
ECHO AV AREA/BSA PEAK VELOCITY: 1.1 CM2/M2
ECHO AV AREA/BSA VTI: 1.3 CM2/M2
ECHO AV MEAN GRADIENT: 7 MMHG
ECHO AV MEAN VELOCITY: 1.1 M/S
ECHO AV PEAK GRADIENT: 12 MMHG
ECHO AV PEAK VELOCITY: 1.8 M/S
ECHO AV VELOCITY RATIO: 0.67
ECHO AV VTI: 30.4 CM
ECHO BSA: 2.04 M2
ECHO EST RA PRESSURE: 3 MMHG
ECHO LA AREA 2C: 18.1 CM2
ECHO LA AREA 4C: 17.3 CM2
ECHO LA DIAMETER INDEX: 1.99 CM/M2
ECHO LA DIAMETER: 4 CM
ECHO LA MAJOR AXIS: 5.9 CM
ECHO LA MINOR AXIS: 5.1 CM
ECHO LA TO AORTIC ROOT RATIO: 1.14
ECHO LA VOL BP: 49 ML (ref 18–58)
ECHO LA VOL MOD A2C: 52 ML (ref 18–58)
ECHO LA VOL MOD A4C: 41 ML (ref 18–58)
ECHO LA VOL/BSA BIPLANE: 24 ML/M2 (ref 16–34)
ECHO LA VOLUME INDEX MOD A2C: 26 ML/M2 (ref 16–34)
ECHO LA VOLUME INDEX MOD A4C: 20 ML/M2 (ref 16–34)
ECHO LV E' LATERAL VELOCITY: 8.81 CM/S
ECHO LV E' SEPTAL VELOCITY: 8.05 CM/S
ECHO LV EDV A4C: 117 ML
ECHO LV EDV INDEX A4C: 58 ML/M2
ECHO LV EF PHYSICIAN: 60 %
ECHO LV EJECTION FRACTION A4C: 65 %
ECHO LV ESV A4C: 41 ML
ECHO LV ESV INDEX A4C: 20 ML/M2
ECHO LV FRACTIONAL SHORTENING: 32 % (ref 28–44)
ECHO LV INTERNAL DIMENSION DIASTOLE INDEX: 2.49 CM/M2
ECHO LV INTERNAL DIMENSION DIASTOLIC: 5 CM (ref 4.2–5.9)
ECHO LV INTERNAL DIMENSION SYSTOLIC INDEX: 1.69 CM/M2
ECHO LV INTERNAL DIMENSION SYSTOLIC: 3.4 CM
ECHO LV IVSD: 1.2 CM (ref 0.6–1)
ECHO LV MASS 2D: 233.7 G (ref 88–224)
ECHO LV MASS INDEX 2D: 116.3 G/M2 (ref 49–115)
ECHO LV POSTERIOR WALL DIASTOLIC: 1.2 CM (ref 0.6–1)
ECHO LV RELATIVE WALL THICKNESS RATIO: 0.48
ECHO LVOT AREA: 3.1 CM2
ECHO LVOT AV VTI INDEX: 0.87
ECHO LVOT DIAM: 2 CM
ECHO LVOT MEAN GRADIENT: 3 MMHG
ECHO LVOT PEAK GRADIENT: 6 MMHG
ECHO LVOT PEAK VELOCITY: 1.2 M/S
ECHO LVOT STROKE VOLUME INDEX: 41.2 ML/M2
ECHO LVOT SV: 82.9 ML
ECHO LVOT VTI: 26.4 CM
ECHO MV A VELOCITY: 0.87 M/S
ECHO MV AREA VTI: 3.9 CM2
ECHO MV E DECELERATION TIME (DT): 211 MS
ECHO MV E VELOCITY: 0.73 M/S
ECHO MV E/A RATIO: 0.84
ECHO MV E/E' LATERAL: 8.29
ECHO MV E/E' RATIO (AVERAGED): 8.68
ECHO MV E/E' SEPTAL: 9.07
ECHO MV LVOT VTI INDEX: 0.81
ECHO MV MAX VELOCITY: 0.7 M/S
ECHO MV MEAN GRADIENT: 1 MMHG
ECHO MV MEAN VELOCITY: 0.4 M/S
ECHO MV PEAK GRADIENT: 2 MMHG
ECHO MV VTI: 21.5 CM
ECHO RA AREA 4C: 12.1 CM2
ECHO RA END SYSTOLIC VOLUME APICAL 4 CHAMBER INDEX BSA: 12 ML/M2
ECHO RA VOLUME: 25 ML
ECHO RV BASAL DIMENSION: 3.5 CM
ECHO RV FREE WALL PEAK S': 17.1 CM/S
ECHO RV TAPSE: 2.3 CM (ref 1.7–?)
ERYTHROCYTE [DISTWIDTH] IN BLOOD BY AUTOMATED COUNT: 15.9 % (ref 11.5–14.9)
EST. AVERAGE GLUCOSE BLD GHB EST-MCNC: 183 MG/DL
FLUAV RNA NPH QL NAA+NON-PROBE: NOT DETECTED
FLUBV RNA NPH QL NAA+NON-PROBE: NOT DETECTED
GFR, ESTIMATED: >90 ML/MIN/1.73M2
GLUCOSE BLD-MCNC: 134 MG/DL (ref 75–110)
GLUCOSE BLD-MCNC: 192 MG/DL (ref 75–110)
GLUCOSE BLD-MCNC: 198 MG/DL (ref 75–110)
GLUCOSE BLD-MCNC: 203 MG/DL (ref 75–110)
GLUCOSE SERPL-MCNC: 126 MG/DL (ref 74–99)
HADV DNA NPH QL NAA+NON-PROBE: NOT DETECTED
HBA1C MFR BLD: 8 % (ref 4–6)
HCOV 229E RNA NPH QL NAA+NON-PROBE: NOT DETECTED
HCOV HKU1 RNA NPH QL NAA+NON-PROBE: NOT DETECTED
HCOV NL63 RNA NPH QL NAA+NON-PROBE: NOT DETECTED
HCOV OC43 RNA NPH QL NAA+NON-PROBE: NOT DETECTED
HCT VFR BLD AUTO: 31 % (ref 41–53)
HDLC SERPL-MCNC: 26 MG/DL
HGB BLD-MCNC: 10.6 G/DL (ref 13.5–17.5)
HMPV RNA NPH QL NAA+NON-PROBE: NOT DETECTED
HPIV1 RNA NPH QL NAA+NON-PROBE: NOT DETECTED
HPIV2 RNA NPH QL NAA+NON-PROBE: NOT DETECTED
HPIV3 RNA NPH QL NAA+NON-PROBE: NOT DETECTED
HPIV4 RNA NPH QL NAA+NON-PROBE: NOT DETECTED
LDLC SERPL CALC-MCNC: 39 MG/DL (ref 0–100)
M PNEUMO DNA NPH QL NAA+NON-PROBE: NOT DETECTED
MAGNESIUM SERPL-MCNC: 1.7 MG/DL (ref 1.6–2.4)
MCH RBC QN AUTO: 32.3 PG (ref 26–34)
MCHC RBC AUTO-ENTMCNC: 34.2 G/DL (ref 31–37)
MCV RBC AUTO: 94.5 FL (ref 80–100)
NRBC BLD-RTO: 0 PER 100 WBC
PATH REV BLD -IMP: NORMAL
PLATELET # BLD AUTO: ABNORMAL K/UL (ref 150–450)
PLATELET, FLUORESCENCE: 45 K/UL (ref 150–450)
PLATELETS.RETICULATED NFR BLD AUTO: 3.4 % (ref 1.1–10.3)
PMV BLD AUTO: 11.4 FL (ref 8–13.5)
POTASSIUM SERPL-SCNC: 3 MMOL/L (ref 3.7–5.3)
PROCALCITONIN SERPL-MCNC: 0.21 NG/ML (ref 0–0.09)
RBC # BLD AUTO: 3.28 M/UL (ref 4.21–5.77)
RSV RNA NPH QL NAA+NON-PROBE: NOT DETECTED
RV+EV RNA NPH QL NAA+NON-PROBE: NOT DETECTED
SARS-COV-2 RNA NPH QL NAA+NON-PROBE: DETECTED
SODIUM SERPL-SCNC: 138 MMOL/L (ref 136–145)
SPECIMEN DESCRIPTION: ABNORMAL
SURGICAL PATHOLOGY REPORT: NORMAL
TME LAST DOSE: 2221 H
TRIGL SERPL-MCNC: 73 MG/DL (ref 0–149)
VANCOMYCIN DOSE: NORMAL MG
VANCOMYCIN SERPL-MCNC: 5.8 UG/ML (ref 5–40)
WBC OTHER # BLD: 3.3 K/UL (ref 3.5–11)

## 2025-07-10 PROCEDURE — 80202 ASSAY OF VANCOMYCIN: CPT

## 2025-07-10 PROCEDURE — 36415 COLL VENOUS BLD VENIPUNCTURE: CPT

## 2025-07-10 PROCEDURE — 82947 ASSAY GLUCOSE BLOOD QUANT: CPT

## 2025-07-10 PROCEDURE — 87641 MR-STAPH DNA AMP PROBE: CPT

## 2025-07-10 PROCEDURE — 87040 BLOOD CULTURE FOR BACTERIA: CPT

## 2025-07-10 PROCEDURE — 84145 PROCALCITONIN (PCT): CPT

## 2025-07-10 PROCEDURE — 80048 BASIC METABOLIC PNL TOTAL CA: CPT

## 2025-07-10 PROCEDURE — 99232 SBSQ HOSP IP/OBS MODERATE 35: CPT | Performed by: INTERNAL MEDICINE

## 2025-07-10 PROCEDURE — 85027 COMPLETE CBC AUTOMATED: CPT

## 2025-07-10 PROCEDURE — 0202U NFCT DS 22 TRGT SARS-COV-2: CPT

## 2025-07-10 PROCEDURE — 83735 ASSAY OF MAGNESIUM: CPT

## 2025-07-10 PROCEDURE — 70551 MRI BRAIN STEM W/O DYE: CPT

## 2025-07-10 PROCEDURE — 99233 SBSQ HOSP IP/OBS HIGH 50: CPT

## 2025-07-10 PROCEDURE — 6370000000 HC RX 637 (ALT 250 FOR IP)

## 2025-07-10 PROCEDURE — 71045 X-RAY EXAM CHEST 1 VIEW: CPT

## 2025-07-10 PROCEDURE — 93306 TTE W/DOPPLER COMPLETE: CPT

## 2025-07-10 PROCEDURE — 2060000000 HC ICU INTERMEDIATE R&B

## 2025-07-10 PROCEDURE — 97530 THERAPEUTIC ACTIVITIES: CPT

## 2025-07-10 PROCEDURE — 83036 HEMOGLOBIN GLYCOSYLATED A1C: CPT

## 2025-07-10 PROCEDURE — 80061 LIPID PANEL: CPT

## 2025-07-10 PROCEDURE — 2580000003 HC RX 258: Performed by: EMERGENCY MEDICINE

## 2025-07-10 PROCEDURE — 2500000003 HC RX 250 WO HCPCS

## 2025-07-10 PROCEDURE — 2580000003 HC RX 258

## 2025-07-10 PROCEDURE — 97166 OT EVAL MOD COMPLEX 45 MIN: CPT

## 2025-07-10 PROCEDURE — 83880 ASSAY OF NATRIURETIC PEPTIDE: CPT

## 2025-07-10 PROCEDURE — 97116 GAIT TRAINING THERAPY: CPT

## 2025-07-10 PROCEDURE — 86738 MYCOPLASMA ANTIBODY: CPT

## 2025-07-10 PROCEDURE — 93306 TTE W/DOPPLER COMPLETE: CPT | Performed by: STUDENT IN AN ORGANIZED HEALTH CARE EDUCATION/TRAINING PROGRAM

## 2025-07-10 PROCEDURE — 97162 PT EVAL MOD COMPLEX 30 MIN: CPT

## 2025-07-10 PROCEDURE — 6360000002 HC RX W HCPCS

## 2025-07-10 PROCEDURE — 99232 SBSQ HOSP IP/OBS MODERATE 35: CPT | Performed by: PSYCHIATRY & NEUROLOGY

## 2025-07-10 RX ORDER — POTASSIUM CHLORIDE 1500 MG/1
40 TABLET, EXTENDED RELEASE ORAL PRN
Status: DISCONTINUED | OUTPATIENT
Start: 2025-07-10 | End: 2025-07-12 | Stop reason: HOSPADM

## 2025-07-10 RX ORDER — DEXTROMETHORPHAN POLISTIREX 30 MG/5ML
60 SUSPENSION ORAL EVERY 12 HOURS SCHEDULED
Status: DISCONTINUED | OUTPATIENT
Start: 2025-07-10 | End: 2025-07-12 | Stop reason: HOSPADM

## 2025-07-10 RX ORDER — ENOXAPARIN SODIUM 100 MG/ML
40 INJECTION SUBCUTANEOUS DAILY
Status: DISCONTINUED | OUTPATIENT
Start: 2025-07-10 | End: 2025-07-12 | Stop reason: HOSPADM

## 2025-07-10 RX ORDER — POTASSIUM CHLORIDE 7.45 MG/ML
10 INJECTION INTRAVENOUS PRN
Status: DISCONTINUED | OUTPATIENT
Start: 2025-07-10 | End: 2025-07-12 | Stop reason: HOSPADM

## 2025-07-10 RX ORDER — GUAIFENESIN 600 MG/1
600 TABLET, EXTENDED RELEASE ORAL 2 TIMES DAILY
Status: DISCONTINUED | OUTPATIENT
Start: 2025-07-10 | End: 2025-07-12 | Stop reason: HOSPADM

## 2025-07-10 RX ORDER — ALOGLIPTIN 6.25 MG/1
6.25 TABLET, FILM COATED ORAL DAILY
Status: DISCONTINUED | OUTPATIENT
Start: 2025-07-10 | End: 2025-07-12 | Stop reason: HOSPADM

## 2025-07-10 RX ORDER — ASPIRIN 81 MG/1
81 TABLET, CHEWABLE ORAL DAILY
Status: DISCONTINUED | OUTPATIENT
Start: 2025-07-10 | End: 2025-07-12 | Stop reason: HOSPADM

## 2025-07-10 RX ORDER — ASPIRIN 300 MG/1
300 SUPPOSITORY RECTAL DAILY
Status: DISCONTINUED | OUTPATIENT
Start: 2025-07-10 | End: 2025-07-12 | Stop reason: HOSPADM

## 2025-07-10 RX ADMIN — SODIUM CHLORIDE: 0.9 INJECTION, SOLUTION INTRAVENOUS at 21:15

## 2025-07-10 RX ADMIN — POTASSIUM CHLORIDE 10 MEQ: 7.46 INJECTION, SOLUTION INTRAVENOUS at 17:50

## 2025-07-10 RX ADMIN — SODIUM CHLORIDE, PRESERVATIVE FREE 10 ML: 5 INJECTION INTRAVENOUS at 12:36

## 2025-07-10 RX ADMIN — POTASSIUM CHLORIDE 10 MEQ: 7.46 INJECTION, SOLUTION INTRAVENOUS at 14:41

## 2025-07-10 RX ADMIN — ATORVASTATIN CALCIUM 80 MG: 80 TABLET, FILM COATED ORAL at 21:02

## 2025-07-10 RX ADMIN — GUAIFENESIN 600 MG: 600 TABLET ORAL at 13:54

## 2025-07-10 RX ADMIN — POTASSIUM CHLORIDE 10 MEQ: 7.46 INJECTION, SOLUTION INTRAVENOUS at 12:36

## 2025-07-10 RX ADMIN — ENOXAPARIN SODIUM 40 MG: 100 INJECTION SUBCUTANEOUS at 13:54

## 2025-07-10 RX ADMIN — Medication 60 MG: at 21:02

## 2025-07-10 RX ADMIN — POTASSIUM CHLORIDE 10 MEQ: 7.46 INJECTION, SOLUTION INTRAVENOUS at 16:17

## 2025-07-10 RX ADMIN — GUAIFENESIN 600 MG: 600 TABLET ORAL at 21:02

## 2025-07-10 RX ADMIN — POTASSIUM CHLORIDE 10 MEQ: 7.46 INJECTION, SOLUTION INTRAVENOUS at 19:29

## 2025-07-10 RX ADMIN — ALOGLIPTIN 6.25 MG: 6.25 TABLET, FILM COATED ORAL at 16:31

## 2025-07-10 RX ADMIN — VANCOMYCIN HYDROCHLORIDE 1000 MG: 1 INJECTION, POWDER, LYOPHILIZED, FOR SOLUTION INTRAVENOUS at 17:58

## 2025-07-10 RX ADMIN — WATER 1000 MG: 1 INJECTION INTRAMUSCULAR; INTRAVENOUS; SUBCUTANEOUS at 13:54

## 2025-07-10 RX ADMIN — SODIUM CHLORIDE, PRESERVATIVE FREE 10 ML: 5 INJECTION INTRAVENOUS at 21:02

## 2025-07-10 RX ADMIN — POTASSIUM CHLORIDE 10 MEQ: 7.46 INJECTION, SOLUTION INTRAVENOUS at 11:26

## 2025-07-10 NOTE — PROGRESS NOTES
RN notified Dr. Dorsey of positive blood cultures;  DIRECT GRAM STAIN FROM BOTTLE: GRAM POSITIVE COCCI IN CLUSTERS P

## 2025-07-10 NOTE — CARE COORDINATION
Patient and/or primary caregiver participated in post-hospital care planning and their ability to address care needs was assessed. Yes    Family/caregiver is willing and able to care for patient at home.  Yes    Family/caregiver educated on resources available including durable medical equipment and respite care. Yes

## 2025-07-10 NOTE — CONSULTS
[facility] PULMONARY, CRITICAL CARE & SLEEP   Carlos Enrique Murcia MD/ Jose A Sargent MD/ Jamel GARAY AGACNP-BC NP-C  Abeba GARAY NP-C  Rigoberto GARAY NP-C   CONSULT NOTE      Date of Admission: 7/8/2025  6:53 PM    Chief complaint: multiple falls with syncope    Referring Physician: * No referring provider recorded for this case *  PCP: Carl Rogers MD     History of Present Illness:     Carlos Enrique is a 74-year-old male who is here for multiple falls with syncope  Wife tells me that patient was trimming his bushes, he went back outside and tripped over a trash can.  It looks like he fell 3 times from what she is telling me and was syncopal.  He was noted to have delayed speech and confusion.  He was noted to hit his head on asphalt.  He was hypotensive in the ER and received IV fluids which corrected it, received over a liter.  CT workup was negative for stroke.  Did have mild degenerative changes on his cervical spine.  He has no significant surgical history.  His past medical history includes prediabetic, CKD, hypertension, rheumatoid arthritis and gets Remicade infusions/methotrexate  Rheumatologist Dr. Jacobs    Neurology did evaluate the patient and he also had an MRI of his brain.  MRI showed chronic small vessel disease, mild generalized parenchymal atrophy, mucosal thickening of sinuses.    The wife does tell me patient has chronic sinus issues and occasionally has to use Flonase for postnasal drip and cough.  She also mentions that patient does take Lasix.  Patient and wife do admit to being around sick contacts this past Sunday.  They were with her son and daughter-in-law who had just come back from a cruise and were sick with a cold, wife tells me she thinks she may have come down with a cold at some point this week but is asymptomatic today.  Patient denies feeling sick but did have somewhat of a congested cough, wife says this is a little worse than his

## 2025-07-10 NOTE — PLAN OF CARE
Problem: Safety - Adult  Goal: Free from fall injury  7/10/2025 0452 by Saskia Dacosta RN  Outcome: Progressing  Flowsheets (Taken 7/9/2025 2000)  Free From Fall Injury:   Instruct family/caregiver on patient safety   Based on caregiver fall risk screen, instruct family/caregiver to ask for assistance with transferring infant if caregiver noted to have fall risk factors  7/9/2025 1556 by Judy Smith RN  Outcome: Progressing     Problem: Chronic Conditions and Co-morbidities  Goal: Patient's chronic conditions and co-morbidity symptoms are monitored and maintained or improved  7/10/2025 0452 by Saskia Dacosta RN  Outcome: Progressing  Flowsheets (Taken 7/9/2025 2000)  Care Plan - Patient's Chronic Conditions and Co-Morbidity Symptoms are Monitored and Maintained or Improved:   Monitor and assess patient's chronic conditions and comorbid symptoms for stability, deterioration, or improvement   Collaborate with multidisciplinary team to address chronic and comorbid conditions and prevent exacerbation or deterioration   Update acute care plan with appropriate goals if chronic or comorbid symptoms are exacerbated and prevent overall improvement and discharge  7/9/2025 1556 by Judy Smith RN  Outcome: Progressing  Flowsheets (Taken 7/9/2025 1245)  Care Plan - Patient's Chronic Conditions and Co-Morbidity Symptoms are Monitored and Maintained or Improved:   Monitor and assess patient's chronic conditions and comorbid symptoms for stability, deterioration, or improvement   Collaborate with multidisciplinary team to address chronic and comorbid conditions and prevent exacerbation or deterioration   Update acute care plan with appropriate goals if chronic or comorbid symptoms are exacerbated and prevent overall improvement and discharge     Problem: Discharge Planning  Goal: Discharge to home or other facility with appropriate resources  7/10/2025 0452 by Saskia Dacosta RN  Outcome:  output   Administer fluid and/or volume expanders as ordered   For PPHN infants, administer sedation as ordered and minimize all controllable stressors.   Administer vasoactive medications as ordered  7/9/2025 1556 by Judy Smith RN  Outcome: Progressing  Flowsheets (Taken 7/9/2025 1245)  Maintains optimal cardiac output and hemodynamic stability:   Monitor blood pressure and heart rate   Assess for signs of decreased cardiac output   Monitor urine output and notify Licensed Independent Practitioner for values outside of normal range   Administer fluid and/or volume expanders as ordered  Goal: Absence of cardiac dysrhythmias or at baseline  7/10/2025 0452 by Saskia Dacosta RN  Outcome: Progressing  Flowsheets (Taken 7/9/2025 2000)  Absence of cardiac dysrhythmias or at baseline:   Monitor cardiac rate and rhythm   Assess for signs of decreased cardiac output   Administer antiarrhythmia medication and electrolyte replacement as ordered  7/9/2025 1556 by Judy Smith RN  Outcome: Progressing  Flowsheets (Taken 7/9/2025 1245)  Absence of cardiac dysrhythmias or at baseline:   Monitor cardiac rate and rhythm   Assess for signs of decreased cardiac output   Administer antiarrhythmia medication and electrolyte replacement as ordered     Problem: Musculoskeletal - Adult  Goal: Return mobility to safest level of function  7/10/2025 0452 by Saskia Dacosta RN  Outcome: Progressing  Flowsheets (Taken 7/9/2025 2000)  Return Mobility to Safest Level of Function:   Assess patient stability and activity tolerance for standing, transferring and ambulating with or without assistive devices   Assist with transfers and ambulation using safe patient handling equipment as needed   Ensure adequate protection for wounds/incisions during mobilization   Obtain physical therapy/occupational therapy consults as needed   Apply continuous passive motion per provider or physical therapy orders to increase flexion toward goal

## 2025-07-10 NOTE — CONSULTS
[facility] Neurology   IN-PATIENT SERVICE      NEUROLOGY CONSULT  NOTE            Date:   7/9/2025  Patient name:  Carlos Enrique Padron  Date of admission:  7/8/2025  YOB: 1950      Chief Complaint:     Chief Complaint   Patient presents with    Fall    Elbow Pain     Left    Abrasion     Left shoulder       Reason for Consult:    Fall     History of Present Illness:     The patient is a 74 y.o. male who presents with Fall, Elbow Pain (Left), and Abrasion (Left shoulder)  . The patient was seen and examined and the chart was reviewed.     This is a 74 year old male with history of HTN and HLD and RA who presented to the ED as a STROKE ALERT after fall. Family at bedside states he was outside trying to take the trash out when he fell, hit his head and lost consciousness. Patient's wife said that the neighbor then knocked on the door, notified her that her  was outside, and they were able to assist him back into the house.  While reentering the house, patient had a mechanical fall/syncopal event on the stairs going up to their house.  Patient's wife reports that he \"slumped over and appeared to be unconscious\" but did not hit his head.  He came to shortly thereafter.  Patient was then brought in the house, and their children came over to the home, at which time, he had a third syncopal event.  No seizure activity seen.    HCT showed NAP, CTA showed 3.5 mm aneurysm at the bifurcation of an azygous A 2 segment.     Past Medical History:     Past Medical History:   Diagnosis Date    Borderline diabetic     Cerebral aneurysm 7/9/2025    Chronic renal disease, stage III (HCC) [126240] 05/31/2022    History of colonoscopy 12/14/2018    multiple polyps,tubular adenoma,diverticulosis    Hyperlipidemia     Hypertension     Osteoarthritis rheumatoid arthritis    6/12    RA (rheumatoid arthritis) ([facility])     Type 2 diabetes mellitus without complication ([facility])         Past Surgical History:     Past Surgical History:  is maintained without evidence of an acute infarct.  There is  no evidence of hydrocephalus. Patchy bilateral periventricular and  subcortical white matter hypodensities are nonspecific, but compatible with  chronic microvascular ischemic change.    ORBITS: The visualized portion of the orbits demonstrate no acute abnormality.    SINUSES: Mild mucosal thickening in the right maxillary sinus.  No mastoid  effusion.    SOFT TISSUES/SKULL:  No acute abnormality of the visualized skull or soft  tissues.    Impression  No acute intracranial abnormality.    The findings were sent to the Radiology Results Communication [facility] at 8:52  pm on 7/8/2025 to be communicated to a licensed caregiver.        I personally reviewed all of the above medications, clinical laboratory, imaging and other diagnostic tests.         Impression:      74 year old male who presented with fall and syncope- per family patient was hypotensive during episodes.  3.5 mm aneurysm at the bifurcation of an azygous A 2 segment. Attempted transfer to [facility] for [facility] but not beds available  HTN, HLD  Falls    Plan:     [facility] outpatient follow-up for aneurysm  EEG  Orthostatics  Discussed with family at bedside  Discussed with RN  Neuro-checks  MRI Brain   Remaining management per primary team  Lipid panel, hgba1c  Telemetry  ASA  Attempted transfer for [facility] but no bed availability, will need outpatient follow-up        Thank you for this very interesting consultation.      Electronically signed by Romi Iglesias DO on 7/9/2025 at 8:55 PM      Romi Iglesias DO  [facility] [place]  Neurology

## 2025-07-10 NOTE — PROGRESS NOTES
[facility] Internal Medicine   Jonnie Pena MD; MD Radha Montoya MD, MD , Aisha Dorsey MD    [facility] Internal Medicine   IN-PATIENT SERVICE   [facility]    Progress Note            Date:   7/10/2025  Patient name:  Carlos Enrique Padron  Date of admission:  7/8/2025  6:53 PM  MRN:   [id number]  Account:  [id number]  YOB: 1950  PCP:    Carl Rogers MD  Room:   2091/2091-01  Code Status:    Full Code    Chief Complaint:     Chief Complaint   Patient presents with    Fall    Elbow Pain     Left    Abrasion     Left shoulder       History Obtained From:     patient, electronic medical record    History of Present Illness:     Carlos Enrique Padron is a 74 y.o. Non- / non  male who presents with Fall, Elbow Pain (Left), and Abrasion (Left shoulder)   and is admitted to the hospital for the management of Stroke-like symptoms.    74-year-old male presented with multiple falls.  Initial fall while taking trash out fell on the grass unsure if it was a syncopal event.  Helped by his family members to get back into the house, another syncopal event.  Denies hitting his head.  Came into the ED.  Patient does have slow speech at baseline.  Stroke alert was called in the ED.  Imaging was done, telestroke recommended neuro evaluation and transferred to [facility] for evaluation and aneurysm.  But due to unavailability, patient will be monitored here with close neurology follow-up.    Past Medical History:     Past Medical History:   Diagnosis Date    Borderline diabetic     Cerebral aneurysm 7/9/2025    Chronic renal disease, stage III (HCC) [171284] 05/31/2022    History of colonoscopy 12/14/2018    multiple polyps,tubular adenoma,diverticulosis    Hyperlipidemia     Hypertension     Osteoarthritis rheumatoid arthritis    6/12    RA (rheumatoid arthritis) (HCC)     Type 2 diabetes mellitus without complication (HCC)   3.7 - 5.3 mmol/L    Chloride 108 (H) 98 - 107 mmol/L    CO2 20 20 - 31 mmol/L    Anion Gap 10 9 - 16 mmol/L    Glucose 126 (H) 74 - 99 mg/dL    BUN 10 8 - 23 mg/dL    Creatinine 0.9 0.7 - 1.2 mg/dL    Est, Glom Filt Rate >90 >60 mL/min/1.73m2    Calcium 7.7 (L) 8.6 - 10.4 mg/dL   CBC    Collection Time: 07/10/25  5:57 AM   Result Value Ref Range    WBC 3.3 (L) 3.5 - 11.0 k/uL    RBC 3.28 (L) 4.21 - 5.77 m/uL    Hemoglobin 10.6 (L) 13.5 - 17.5 g/dL    Hematocrit 31.0 (L) 41.0 - 53.0 %    MCV 94.5 80.0 - 100.0 fL    MCH 32.3 26.0 - 34.0 pg    MCHC 34.2 31.0 - 37.0 g/dL    RDW 15.9 (H) 11.5 - 14.9 %    Platelets See Reflexed IPF Result 150 - 450 k/uL    MPV 11.4 8.0 - 13.5 fL    Platelet, Fluorescence 45 (L) 150 - 450 k/uL    Platelet, Immature Fraction 3.4 1.1 - 10.3 %    NRBC Automated 0.0 0 per 100 WBC   Hemoglobin A1c    Collection Time: 07/10/25  5:57 AM   Result Value Ref Range    Hemoglobin A1C 8.0 (H) 4.0 - 6.0 %    Estimated Avg Glucose 183 mg/dL   Lipid Panel    Collection Time: 07/10/25  5:57 AM   Result Value Ref Range    Cholesterol, Total 80 0 - 199 mg/dL    HDL 26 (L) >40 mg/dL    LDL Cholesterol 39 0 - 100 mg/dL    Chol/HDL Ratio 3.1     Triglycerides 73 0 - 149 mg/dL   Magnesium    Collection Time: 07/10/25  5:57 AM   Result Value Ref Range    Magnesium 1.7 1.6 - 2.4 mg/dL   POC Glucose Fingerstick    Collection Time: 07/10/25  6:29 AM   Result Value Ref Range    POC Glucose 134 (H) 75 - 110 mg/dL   Echo (TTE) complete (PRN contrast/bubble/strain/3D)    Collection Time: 07/10/25  9:00 AM   Result Value Ref Range    LA Minor Axis 5.1 cm    LA Major Amory 5.9 cm    LA Area 2C 18.1 cm2    LA Area 4C 17.3 cm2    LA Volume MOD A2C 52 18 - 58 mL    LA Volume MOD A4C 41 18 - 58 mL    LA Volume BP 49 18 - 58 mL    LA Diameter 4.0 cm    LV EDV A4C 117 mL    LV ESV A4C 41 mL    IVSd 1.2 (A) 0.6 - 1.0 cm    LVIDd 5.0 4.2 - 5.9 cm    LVIDs 3.4 cm    LVOT Diameter 2.0 cm    LVOT Mean Gradient 3 mmHg    LVOT VTI 26.4

## 2025-07-10 NOTE — CARE COORDINATION
Case Management Assessment  Initial Evaluation    Date/Time of Evaluation: 7/10/2025 10:56 AM  Assessment Completed by: Becky Benitez RN    If patient is discharged prior to next notation, then this note serves as note for discharge by case management.    Patient Name: Carlos Enrique Padron                   YOB: 1950  Diagnosis: Aneurysm [I72.9]  Stroke-like symptoms [R29.90]  Symptoms of cerebrovascular accident [R09.89]  Fall, initial encounter [W19.XXXA]  Acute hypotension [I95.9]  Cerebral aneurysm [I67.1]                   Date / Time: 7/8/2025  6:53 PM    Patient Admission Status: Inpatient   Readmission Risk (Low < 19, Mod (19-27), High > 27): Readmission Risk Score: 11.6    Current PCP: Carl Rogers MD  PCP verified by CM? Yes    Chart Reviewed: Yes      History Provided by: Patient  Patient Orientation: Alert and Oriented    Patient Cognition: Alert    Hospitalization in the last 30 days (Readmission):  No    If yes, Readmission Assessment in  Navigator will be completed.    Advance Directives:      Code Status: Full Code   Patient's Primary Decision Maker is: Legal Next of Kin    Primary Decision Maker: VitoEvelyn - Spouse - [phone]    Primary Decision Maker: fred duffy - Child - [phone]    Discharge Planning:    Patient lives with: Spouse/Significant Other Type of Home: House  Primary Care Giver: Self  Patient Support Systems include: Spouse/Significant Other   Current Financial resources: Medicare  Current community resources: None  Current services prior to admission: Durable Medical Equipment            Current DME: Cane, Walker, Glucometer            Type of Home Care services:  None    ADLS  Prior functional level: Independent in ADLs/IADLs  Current functional level: Independent in ADLs/IADLs    PT AM-PAC: 16 /24  OT AM-PAC: 19 /24    Family can provide assistance at DC: Yes  Would you like Case Management to discuss the discharge plan with any other family

## 2025-07-10 NOTE — PROGRESS NOTES
[facility]   Occupational Therapy Evaluation  Date: 7/10/25  Patient Name: Carlos Enrique Padron       Room: -  MRN: [id number]  Account: [id number]   : 1950  (74 y.o.) Gender: male     Discharge Recommendations:  Further Occupational Therapy is recommended upon facility discharge.    OT Equipment Recommendations  Other: TBD    Referring Practitioner: Anh Dorsey MD  Diagnosis: stroke like symptoms        Treatment Diagnosis: impaired self care status    Past Medical History:  has a past medical history of Borderline diabetic, Cerebral aneurysm, Chronic renal disease, stage III ([facility]) [212497], History of colonoscopy, Hyperlipidemia, Hypertension, Osteoarthritis, RA (rheumatoid arthritis) ([facility]), and Type 2 diabetes mellitus without complication ([facility]).    Past Surgical History:   has a past surgical history that includes Tonsillectomy and adenoidectomy; Colonoscopy (N/A, 2018); and skin biopsy (2022).    Restrictions  Restrictions/Precautions  Restrictions/Precautions: Fall Risk, Bed Alarm  Activity Level: Up as Tolerated  Required Braces or Orthoses?: No  Implants Present? :  (pt denies)  Position Activity Restriction  Other Position/Activity Restrictions: Speech therapy recommendations:  regular diet w/ thin liquids      Vitals  Vitals  O2 Device: None (Room air)     Subjective  Subjective: pt supine upon OT/PT arrival into pt room. Pt agreeable to participate in evaluation process.  Comments: NASIM rashid'tomás pt to be seen by therapy this am.  Pain  Pre-Pain: 0  Post-Pain: 0    Social/Functional History  Social/Functional History  Lives With: Spouse  Type of Home: House  Home Layout: One level  Home Access: Ramped entrance (Pt reports ramp hidden by landscape in front entrance, ramp and 1 step at back entrance)  Bathroom Shower/Tub: Walk-in shower, Doors  Bathroom Toilet: Handicap height (comfort height toilet.sink next to toilet to push up from.)  Bathroom  Goals  Time Frame for Short Term Goals: By discharge  Short Term Goal 1: Pt will complete ADLs with Mod I, good safety, and use of AE/DME/Modified techniques as needed  Short Term Goal 2: Pt will complete functional transfers/mobility with Mod I, good safety, and use of least restrictive device  Short Term Goal 3: Pt will tolerate static/dynamic standing for 7+ minutes with SBA with LRAD during self care/functional activity to improve balance and activity tolerance needed for self care  Short Term Goal 4: Pt will verbalize/demonstrate good understanding of home safety, fall prevention, EC/WS techniques, AE use PRN, and pain management strategies to improve safety and independence with self care tasks  Short Term Goal 5: Pt will actively participate in 15+ minutes of therapeutic exercise/functional activity to improve safety and independence with self care tasks    Plan  Occupational Therapy Plan  Times Per Week: 4-5  Current Treatment Recommendations: Strengthening, Balance training, Functional mobility training, Endurance training, Pain management, Safety education & training, Patient/Caregiver education & training, Equipment evaluation, education, & procurement, Self-Care / ADL, Home management training    OT Individual Minutes  OT Individual Minutes  Time In: 1021  Time Out: 1050  Minutes: 29  Time Code Minutes   Timed Code Treatment Minutes: 10 Minutes    Co Clint with Terrie PT    Electronically signed by NGUYỄN Adan  on 7/10/25 at 1:31 PM EDT

## 2025-07-10 NOTE — PROGRESS NOTES
[facility]   Pharmacy Pharmacokinetic Monitoring Service - Vancomycin     Carlos Enrique Padron is a 74 y.o. male starting on vancomycin therapy for Bacteremia. Pharmacy consulted by Dr Dorsey for monitoring and adjustment.    Target Concentration: Goal AUC/BRANDIE 400-600 mg*hr/L    Additional Antimicrobials:     Pertinent Laboratory Values:   Wt Readings from Last 1 Encounters:   07/10/25 87.1 kg (192 lb)     Temp Readings from Last 1 Encounters:   07/10/25 97.8 °F (36.6 °C) (Oral)     Estimated Creatinine Clearance: 80 mL/min (based on SCr of 0.9 mg/dL).  Recent Labs     07/08/25  1908 07/09/25  0555 07/09/25  1441 07/10/25  0557   CREATININE 1.5* 1.2  --  0.9   BUN 22  --   --  10   WBC 4.1  --  4.6 3.3*     Procalcitonin:      Pertinent Cultures:  Culture Date Source Results   7/10  7/10  7/10  7/10 Blood x 2   Urine cx  Sputum cx   Resp panel Pending  Neg  Pending  pending   MRSA Nasal Swab: N/A. Non-respiratory infection.    Plan:  Dosing recommendations based on Bayesian software  Start vancomycin 2250 mg Load on 7/8  Level today 5.8  Anticipated AUC of 566 and trough concentration of 17.3 at steady state  Renal labs as indicated   Vancomycin concentration ordered for 7/11 @ 0600   Pharmacy will continue to monitor patient and adjust therapy as indicated  Loading dose: N/A  Regimen: 1000 mg IV every 12 hours.  Start time: 13:30 on 07/10/2025  Exposure target: AUC24 (range) 400-600 mg/L.hr   RZY12-13: 470 mg/L.hr  AUC24,ss: 566 mg/L.hr  Probability of AUC24 > 400: 97 %  Ctrough,ss: 17.3 mg/L  Probability of Ctrough,ss > 20: 27 %    Thank you for the consult,  Jony Puente [facility]  7/10/2025 1:37 PM

## 2025-07-10 NOTE — PROGRESS NOTES
Physical Therapy  [facility]   Physical Therapy Evaluation  Date: 7/10/25  Patient Name: Carlos Enrique Padron       Room: -  MRN: [id number]  Account: [id number]   : 1950  (74 y.o.) Gender: male     Discharge Recommendations:  Discharge Recommendations: Continue to assess pending progress, Patient would benefit from continued therapy after discharge, Therapy recommended at discharge     PT Equipment Recommendations  Equipment Needed: No (has cane and wheeled walker)     Past Medical History:  has a past medical history of Borderline diabetic, Cerebral aneurysm, Chronic renal disease, stage III ([facility]) [[id number]], History of colonoscopy, Hyperlipidemia, Hypertension, Osteoarthritis, RA (rheumatoid arthritis) ([facility]), and Type 2 diabetes mellitus without complication ([facility]).  Past Surgical History:   has a past surgical history that includes Tonsillectomy and adenoidectomy; Colonoscopy (N/A, 2018); and skin biopsy (2022).    Subjective  Subjective  Subjective: \"I passed out.\"     General  Patient assessed for rehabilitation services?: Yes  Additional Pertinent Hx: Per H & P note: Carlos Enrique Padron is a 74 y.o. Non- / non  male who presents with Fall, Elbow Pain (Left), and Abrasion (Left shoulder)   and is admitted to the hospital for the management of Stroke-like symptoms.     74-year-old male presented with multiple falls.  Initial fall while taking trash out fell on the grass unsure if it was a syncopal event.  Helped by his family members to get back into the house, another syncopal event.  Denies hitting his head.  Came into the ED.  Patient does have slow speech at baseline.  Stroke alert was called in the ED.  Imaging was done, telestroke recommended neuro evaluation and transferred to [facility] for evaluation and aneurysm.  But due to unavailability, patient will be monitored here with close neurology follow-up.  Response To Previous Treatment: Not

## 2025-07-10 NOTE — PROGRESS NOTES
Today's Date: 7/10/2025  Patient Name: Carlos Enrique Padron  Date of admission: 7/8/2025  6:53 PM  Patient's age: 74 y.o., 1950  Admission Dx: Aneurysm [I72.9]  Stroke-like symptoms [R29.90]  Symptoms of cerebrovascular accident [R09.89]  Fall, initial encounter [W19.XXXA]  Acute hypotension [I95.9]  Cerebral aneurysm [I67.1]    Reason for Consult: management recommendations  Requesting Physician: Brant Goins MD    CHIEF COMPLAINT: Falls, thrombocytopenia    INTERIM HISTORY  Patient seen and evaluated.  Continues to have orthostatic hypotension and dizziness.  Appreciate primary care input.  Needs better control of the blood sugar.  Positive blood culture yesterday  Started on broad-spectrum antibiotics  HISTORY OF PRESENT ILLNESS:      The patient is a 74 y.o.   male who is admitted to the hospital after a fall and syncopal episode.  The patient has history of rheumatoid arthritis and chronic thrombocytopenia and he follows with rheumatology Dr. Jacobs.      Past Medical History:   has a past medical history of Borderline diabetic, Cerebral aneurysm, Chronic renal disease, stage III ([facility]) [316013], History of colonoscopy, Hyperlipidemia, Hypertension, Osteoarthritis, RA (rheumatoid arthritis) ([facility]), and Type 2 diabetes mellitus without complication ([facility]).    Past Surgical History:   has a past surgical history that includes Tonsillectomy and adenoidectomy; Colonoscopy (N/A, 12/14/2018); and skin biopsy (01/11/2022).     Medications:    Reviewed in Epic     Allergies:  Patient has no known allergies.    Social History:   reports that he has never smoked. He has never used smokeless tobacco. He reports that he does not drink alcohol and does not use drugs.     Family History: family history includes Cancer in his father; Immune Disorder in his mother; Other in his mother.    REVIEW OF SYSTEMS:    Constitutional: No fever or chills. No night sweats, no weight loss   Eyes: No eye discharge, double vision, or eye pain    HEENT: negative for sore mouth, sore throat, hoarseness and voice change   Respiratory: negative for cough , sputum, dyspnea, wheezing, hemoptysis, chest pain   Cardiovascular: negative for chest pain, dyspnea, palpitations, orthopnea, PND   Gastrointestinal: negative for nausea, vomiting, diarrhea, constipation, abdominal pain, Dysphagia, hematemesis and hematochezia   Genitourinary: negative for frequency, dysuria, nocturia, urinary incontinence, and hematuria   Integument: negative for rash, skin lesions, bruises.   Hematologic/Lymphatic: negative for easy bruising, bleeding, lymphadenopathy, or petechiae   Endocrine: negative for heat or cold intolerance,weight changes, change in bowel habits and hair loss   Musculoskeletal: Significant arthralgia, no arthritis  Neurological: negative for headaches, dizziness, seizures, weakness, numbness    PHYSICAL EXAM:      /75   Pulse 88   Temp 97.8 °F (36.6 °C) (Oral)   Resp 16   Ht 1.727 m (5' 7.99\")   Wt 87.1 kg (192 lb)   SpO2 96%   BMI 29.20 kg/m²    Temp (24hrs), Av.4 °F (36.9 °C), Min:97.8 °F (36.6 °C), Max:98.8 °F (37.1 °C)    General appearance - well appearing, no in pain or distress   Mental status - alert and cooperative   Eyes - pupils equal and reactive, extraocular eye movements intact   Ears - bilateral TM's and external ear canals normal   Mouth - mucous membranes moist, pharynx normal without lesions   Neck - supple, no significant adenopathy   Lymphatics - no palpable lymphadenopathy, no hepatosplenomegaly   Chest - clear to auscultation, no wheezes, rales or rhonchi, symmetric air entry   Heart - normal rate, regular rhythm, normal S1, S2, no murmurs  Abdomen - soft, nontender, nondistended, no masses or organomegaly   Neurological - alert, oriented, normal speech, no focal findings or movement disorder noted   Musculoskeletal - no joint tenderness, deformity or swelling   Extremities - peripheral pulses normal, no pedal edema, no

## 2025-07-10 NOTE — PROGRESS NOTES
[facility]  [address]  [place] [address]  PH:  [phone] or [phone]  FAX:  [phone]            Brief history: Carlos Enrique Padron is a 74 y.o. old male admitted on 2025 with  fall.      Subjective: No new neurological events overnight. Patient denies any new weakness, numbness, tingling or headache. Mri brain showed NAP and white matter changes.      Objective: /69   Pulse 81   Temp 98.1 °F (36.7 °C) (Oral)   Resp 16   Ht 1.727 m (5' 7.99\")   Wt 87.1 kg (192 lb)   SpO2 98%   BMI 29.20 kg/m²       Medications:    sodium chloride flush  5-40 mL IntraVENous 2 times per day    [Held by provider] enoxaparin  40 mg SubCUTAneous Daily    atorvastatin  80 mg Oral Nightly    [Held by provider] aspirin  81 mg Oral Daily    Or    [Held by provider] aspirin  300 mg Rectal Daily    insulin lispro  0-4 Units SubCUTAneous 4x Daily AC & HS          Physical Exam:   /69   Pulse 81   Temp 98.1 °F (36.7 °C) (Oral)   Resp 16   Ht 1.727 m (5' 7.99\")   Wt 87.1 kg (192 lb)   SpO2 98%   BMI 29.20 kg/m²   Temp (24hrs), Av.6 °F (37 °C), Min:98.1 °F (36.7 °C), Max:98.8 °F (37.1 °C)        General examination:      General Appearance:  alert, well appearing, and in no acute distress  HEENT: Normocephalic, atraumatic, moist mucus membranes  Neck: supple, no carotid bruits, (-) nuchal rigidity  Lungs:  Respirations unlabored, chest wall no deformity, BS normal  Cardiovascular: normal rate, regular rhythm  Abdomen: Soft, nontender, nondistended, normal bowel sounds  Skin: No gross lesions, rashes, bruising or bleeding on exposed skin area  Extremities:  peripheral pulses palpable, no cyanosis, clubbing or edema  Psych: normal affect      Neurological examination:    Mental status   Alert and oriented x 3; following all commands; speech is fluent, no dysarthria, aphasia.      Cranial nerves   II - visual fields intact to confrontation; pupils reactive  III, IV,  soft  tissues.    Impression  No acute intracranial abnormality.    The findings were sent to the [facility] at 8:52  pm on 7/8/2025 to be communicated to a licensed caregiver.      I personally reviewed all of the above medications, clinical laboratory, imaging and other diagnostic tests.       Impression:       74 year old male who presented with fall and syncope- per family patient was hypotensive during episodes.  3.5 mm aneurysm at the bifurcation of an azygous A 2 segment. Attempted transfer to [facility] for [facility] but not beds available  HTN, HLD  Falls     Plan:      [facility] outpatient follow-up for aneurysm  EEG  Discussed with wife at bedside   Orthostatics  Discussed with RN  Neuro-checks  MRI Brain showed NAP and white matter changes  Remaining management per primary team  Lipid panel, [id number]  Telemetry  ASA  Attempted transfer for [facility] but no bed availability, will need outpatient follow-up     This note is created with the assistance of a speech-recognition program. While intending to generate a document that actually reflects the content of the visit, the document can still have some errors including those of syntax and sound a- like substitutions which may escape proofreading.  In such instances, actual meaning can be extrapolated by contextual derivation.

## 2025-07-10 NOTE — PLAN OF CARE
Problem: Skin/Tissue Integrity  Goal: Skin integrity remains intact  Description: 1.  Monitor for areas of redness and/or skin breakdown  2.  Assess vascular access sites hourly  3.  Every 4-6 hours minimum:  Change oxygen saturation probe site  4.  Every 4-6 hours:  If on nasal continuous positive airway pressure, respiratory therapy assess nares and determine need for appliance change or resting period  7/10/2025 1914 by Naya Rodriguez, RN  Outcome: Progressing     Problem: ABCDS Injury Assessment  Goal: Absence of physical injury  7/10/2025 1914 by Naya Rodriguez, RN  Outcome: Progressing     Problem: Safety - Adult  Goal: Free from fall injury  7/10/2025 1914 by Naya Rodriguez, RN  Outcome: Progressing

## 2025-07-10 NOTE — ACP (ADVANCE CARE PLANNING)
Advance Care Planning     Advance Care Planning Activator (Inpatient)  Conversation Note      Date of ACP Conversation: 7/10/2025     Conversation Conducted with: Patient with Decision Making Capacity    ACP Activator: Becky Benitez RN    Health Care Decision Maker:     Current Designated Health Care Decision Maker:     Primary Decision Maker: Evelyn Padron - Spouse - [phone]    Primary Decision Maker: fred duffy - Child - [phone]  Click here to complete Healthcare Decision Makers including section of the Healthcare Decision Maker Relationship (ie \"Primary\")  Today we documented Decision Maker(s) consistent with Legal Next of Kin hierarchy.    Care Preferences    Ventilation:  \"If you were in your present state of health and suddenly became very ill and were unable to breathe on your own, what would your preference be about the use of a ventilator (breathing machine) if it were available to you?\"      Would the patient desire the use of ventilator (breathing machine)?: yes    \"If your health worsens and it becomes clear that your chance of recovery is unlikely, what would your preference be about the use of a ventilator (breathing machine) if it were available to you?\"     Would the patient desire the use of ventilator (breathing machine)?: No      Resuscitation  \"CPR works best to restart the heart when there is a sudden event, like a heart attack, in someone who is otherwise healthy. Unfortunately, CPR does not typically restart the heart for people who have serious health conditions or who are very sick.\"    \"In the event your heart stopped as a result of an underlying serious health condition, would you want attempts to be made to restart your heart (answer \"yes\" for attempt to resuscitate) or would you prefer a natural death (answer \"no\" for do not attempt to resuscitate)?\" yes       [] Yes   [x] No   Educated Patient / Decision Maker regarding differences between Advance Directives and

## 2025-07-11 LAB
ANION GAP SERPL CALCULATED.3IONS-SCNC: 8 MMOL/L (ref 9–16)
ANION GAP SERPL CALCULATED.3IONS-SCNC: 9 MMOL/L (ref 9–16)
BASOPHILS # BLD: 0 K/UL (ref 0–0.2)
BASOPHILS NFR BLD: 0 % (ref 0–2)
BUN SERPL-MCNC: 7 MG/DL (ref 8–23)
BUN SERPL-MCNC: 7 MG/DL (ref 8–23)
CALCIUM SERPL-MCNC: 7.6 MG/DL (ref 8.6–10.4)
CALCIUM SERPL-MCNC: 7.7 MG/DL (ref 8.6–10.4)
CHLORIDE SERPL-SCNC: 109 MMOL/L (ref 98–107)
CHLORIDE SERPL-SCNC: 110 MMOL/L (ref 98–107)
CO2 SERPL-SCNC: 20 MMOL/L (ref 20–31)
CO2 SERPL-SCNC: 21 MMOL/L (ref 20–31)
CREAT SERPL-MCNC: 0.8 MG/DL (ref 0.7–1.2)
CREAT SERPL-MCNC: 0.9 MG/DL (ref 0.7–1.2)
DATE LAST DOSE: NORMAL
EOSINOPHIL # BLD: 0.03 K/UL (ref 0–0.4)
EOSINOPHILS RELATIVE PERCENT: 1 % (ref 0–4)
ERYTHROCYTE [DISTWIDTH] IN BLOOD BY AUTOMATED COUNT: 15.7 % (ref 11.5–14.9)
GFR, ESTIMATED: >90 ML/MIN/1.73M2
GFR, ESTIMATED: >90 ML/MIN/1.73M2
GLUCOSE BLD-MCNC: 136 MG/DL (ref 75–110)
GLUCOSE BLD-MCNC: 137 MG/DL (ref 75–110)
GLUCOSE BLD-MCNC: 158 MG/DL (ref 75–110)
GLUCOSE BLD-MCNC: 184 MG/DL (ref 75–110)
GLUCOSE SERPL-MCNC: 145 MG/DL (ref 74–99)
GLUCOSE SERPL-MCNC: 178 MG/DL (ref 74–99)
HCT VFR BLD AUTO: 30.8 % (ref 41–53)
HGB BLD-MCNC: 10.9 G/DL (ref 13.5–17.5)
IMM GRANULOCYTES # BLD AUTO: 0 K/UL (ref 0–0.3)
IMM GRANULOCYTES NFR BLD: 0 %
L PNEUMO1 AG UR QL IA.RAPID: NEGATIVE
LYMPHOCYTES NFR BLD: 1.08 K/UL (ref 1–4.8)
LYMPHOCYTES RELATIVE PERCENT: 40 % (ref 24–44)
M PNEUMO IGM SER QL IA: 0.36
MCH RBC QN AUTO: 33.3 PG (ref 26–34)
MCHC RBC AUTO-ENTMCNC: 35.4 G/DL (ref 31–37)
MCV RBC AUTO: 94.2 FL (ref 80–100)
MICROORGANISM SPEC CULT: ABNORMAL
MICROORGANISM/AGENT SPEC: ABNORMAL
MONOCYTES NFR BLD: 0.22 K/UL (ref 0.1–1.3)
MONOCYTES NFR BLD: 8 % (ref 1–7)
MORPHOLOGY: ABNORMAL
MRSA, DNA, NASAL: NEGATIVE
NEUTROPHILS NFR BLD: 51 % (ref 36–66)
NEUTS SEG NFR BLD: 1.37 K/UL (ref 1.3–9.1)
NRBC BLD-RTO: 0 PER 100 WBC
PLATELET # BLD AUTO: 50 K/UL (ref 150–450)
PMV BLD AUTO: 11.1 FL (ref 8–13.5)
POTASSIUM SERPL-SCNC: 3 MMOL/L (ref 3.7–5.3)
POTASSIUM SERPL-SCNC: 3.6 MMOL/L (ref 3.7–5.3)
RBC # BLD AUTO: 3.27 M/UL (ref 4.21–5.77)
S PNEUM AG SPEC QL: NEGATIVE
SERVICE CMNT-IMP: ABNORMAL
SERVICE CMNT-IMP: ABNORMAL
SODIUM SERPL-SCNC: 138 MMOL/L (ref 136–145)
SODIUM SERPL-SCNC: 139 MMOL/L (ref 136–145)
SPECIMEN DESCRIPTION: ABNORMAL
SPECIMEN DESCRIPTION: ABNORMAL
SPECIMEN DESCRIPTION: NORMAL
SPECIMEN SOURCE: NORMAL
TME LAST DOSE: 326 H
VANCOMYCIN DOSE: 1000 MG
VANCOMYCIN SERPL-MCNC: 19.6 UG/ML (ref 5–40)
WBC OTHER # BLD: 2.7 K/UL (ref 3.5–11)

## 2025-07-11 PROCEDURE — 2580000003 HC RX 258: Performed by: SPECIALIST

## 2025-07-11 PROCEDURE — 6370000000 HC RX 637 (ALT 250 FOR IP): Performed by: SPECIALIST

## 2025-07-11 PROCEDURE — 2580000003 HC RX 258: Performed by: EMERGENCY MEDICINE

## 2025-07-11 PROCEDURE — 87205 SMEAR GRAM STAIN: CPT

## 2025-07-11 PROCEDURE — 2580000003 HC RX 258

## 2025-07-11 PROCEDURE — 80048 BASIC METABOLIC PNL TOTAL CA: CPT

## 2025-07-11 PROCEDURE — G0545 PR INHERENT VISIT TO INPT: HCPCS | Performed by: INTERNAL MEDICINE

## 2025-07-11 PROCEDURE — 6360000002 HC RX W HCPCS

## 2025-07-11 PROCEDURE — 87899 AGENT NOS ASSAY W/OPTIC: CPT

## 2025-07-11 PROCEDURE — 99233 SBSQ HOSP IP/OBS HIGH 50: CPT

## 2025-07-11 PROCEDURE — 99232 SBSQ HOSP IP/OBS MODERATE 35: CPT | Performed by: INTERNAL MEDICINE

## 2025-07-11 PROCEDURE — 82947 ASSAY GLUCOSE BLOOD QUANT: CPT

## 2025-07-11 PROCEDURE — 87449 NOS EACH ORGANISM AG IA: CPT

## 2025-07-11 PROCEDURE — 6360000002 HC RX W HCPCS: Performed by: SPECIALIST

## 2025-07-11 PROCEDURE — 2500000003 HC RX 250 WO HCPCS

## 2025-07-11 PROCEDURE — 80202 ASSAY OF VANCOMYCIN: CPT

## 2025-07-11 PROCEDURE — 2060000000 HC ICU INTERMEDIATE R&B

## 2025-07-11 PROCEDURE — 87070 CULTURE OTHR SPECIMN AEROBIC: CPT

## 2025-07-11 PROCEDURE — 99223 1ST HOSP IP/OBS HIGH 75: CPT | Performed by: INTERNAL MEDICINE

## 2025-07-11 PROCEDURE — 97530 THERAPEUTIC ACTIVITIES: CPT

## 2025-07-11 PROCEDURE — 95819 EEG AWAKE AND ASLEEP: CPT

## 2025-07-11 PROCEDURE — 36415 COLL VENOUS BLD VENIPUNCTURE: CPT

## 2025-07-11 PROCEDURE — 85025 COMPLETE CBC W/AUTO DIFF WBC: CPT

## 2025-07-11 PROCEDURE — 97535 SELF CARE MNGMENT TRAINING: CPT

## 2025-07-11 PROCEDURE — 6370000000 HC RX 637 (ALT 250 FOR IP)

## 2025-07-11 PROCEDURE — 99232 SBSQ HOSP IP/OBS MODERATE 35: CPT | Performed by: PSYCHIATRY & NEUROLOGY

## 2025-07-11 RX ORDER — MAGNESIUM SULFATE 1 G/100ML
1000 INJECTION INTRAVENOUS ONCE
Status: COMPLETED | OUTPATIENT
Start: 2025-07-11 | End: 2025-07-11

## 2025-07-11 RX ADMIN — POTASSIUM CHLORIDE 10 MEQ: 7.46 INJECTION, SOLUTION INTRAVENOUS at 15:09

## 2025-07-11 RX ADMIN — POTASSIUM CHLORIDE: 2 INJECTION, SOLUTION, CONCENTRATE INTRAVENOUS at 14:59

## 2025-07-11 RX ADMIN — POTASSIUM BICARBONATE 25 MEQ: 978 TABLET, EFFERVESCENT ORAL at 21:51

## 2025-07-11 RX ADMIN — POTASSIUM CHLORIDE 10 MEQ: 7.46 INJECTION, SOLUTION INTRAVENOUS at 12:21

## 2025-07-11 RX ADMIN — MAGNESIUM SULFATE 1000 MG: 1 INJECTION INTRAVENOUS at 15:05

## 2025-07-11 RX ADMIN — GUAIFENESIN 600 MG: 600 TABLET ORAL at 07:37

## 2025-07-11 RX ADMIN — ALOGLIPTIN 6.25 MG: 6.25 TABLET, FILM COATED ORAL at 11:37

## 2025-07-11 RX ADMIN — POTASSIUM CHLORIDE 10 MEQ: 7.46 INJECTION, SOLUTION INTRAVENOUS at 10:21

## 2025-07-11 RX ADMIN — ENOXAPARIN SODIUM 40 MG: 100 INJECTION SUBCUTANEOUS at 09:45

## 2025-07-11 RX ADMIN — ATORVASTATIN CALCIUM 80 MG: 80 TABLET, FILM COATED ORAL at 21:51

## 2025-07-11 RX ADMIN — POTASSIUM BICARBONATE 25 MEQ: 978 TABLET, EFFERVESCENT ORAL at 15:00

## 2025-07-11 RX ADMIN — POTASSIUM CHLORIDE 10 MEQ: 7.46 INJECTION, SOLUTION INTRAVENOUS at 13:53

## 2025-07-11 RX ADMIN — POTASSIUM CHLORIDE 10 MEQ: 7.46 INJECTION, SOLUTION INTRAVENOUS at 18:33

## 2025-07-11 RX ADMIN — SODIUM CHLORIDE, PRESERVATIVE FREE 10 ML: 5 INJECTION INTRAVENOUS at 07:37

## 2025-07-11 RX ADMIN — Medication 60 MG: at 21:51

## 2025-07-11 RX ADMIN — POTASSIUM CHLORIDE 10 MEQ: 7.46 INJECTION, SOLUTION INTRAVENOUS at 16:44

## 2025-07-11 RX ADMIN — SODIUM CHLORIDE: 0.9 INJECTION, SOLUTION INTRAVENOUS at 03:23

## 2025-07-11 RX ADMIN — Medication 60 MG: at 07:37

## 2025-07-11 RX ADMIN — GUAIFENESIN 600 MG: 600 TABLET ORAL at 21:51

## 2025-07-11 RX ADMIN — VANCOMYCIN HYDROCHLORIDE 1000 MG: 1 INJECTION, POWDER, LYOPHILIZED, FOR SOLUTION INTRAVENOUS at 03:26

## 2025-07-11 ASSESSMENT — ENCOUNTER SYMPTOMS
COLOR CHANGE: 1
COUGH: 1

## 2025-07-11 NOTE — PROGRESS NOTES
Reviewed with dr meza  pt was receiving prn iv potassium for the 3.0 potassium level. he is on his 3 out of 6 bags so far (received 30 meq with 30 meq left to be given totaling 60 meq replacement). i just wanted to make sure you were aware of what he received and will get with his new orders for cont fluids with potassium in it as well as the potassium tablets 25 meq bid order. ok to cont giving those new orders with the iv replacment?     Ok to continue with the new cont potassium in NS fluids and potassium tablet orders as well as the prn iv replacement of 60 meq    BMP for 7 pm

## 2025-07-11 NOTE — PLAN OF CARE
Problem: Safety - Adult  Goal: Free from fall injury  7/11/2025 1525 by Usha Avilez RN  Outcome: Progressing     Problem: Chronic Conditions and Co-morbidities  Goal: Patient's chronic conditions and co-morbidity symptoms are monitored and maintained or improved  7/11/2025 1525 by Usha Avilez, RN  Outcome: Progressing     Problem: Discharge Planning  Goal: Discharge to home or other facility with appropriate resources  7/11/2025 1525 by Usha Avilez, RN  Outcome: Progressing

## 2025-07-11 NOTE — PROGRESS NOTES
[facility] PULMONARY,CRITICAL CARE & SLEEP   Carlos Enrique Murcia MD/Jose A Sargent MD/Jamel GARAY AGAP-BC, NP-C      Abeba GARAY NP-C    Rigoberto GARAY NP-C                                         Pulmonary Progress Note    Patient - Carlos Enrique Padron   Age - 74 y.o.   - 1950  MRN - [id number]  [id number]  Date of Admission - 2025  6:53 PM    Consulting Service/Physician:       Primary Care Physician: Carl Rogers MD    SUBJECTIVE:     Chief Complaint:   Chief Complaint   Patient presents with    Fall    Elbow Pain     Left    Abrasion     Left shoulder     Subjective:    Patient resting in bed  Still having complaints of a nonproductive cough  No acute distress  On room air    VITALS  BP (!) 142/79   Pulse 86   Temp 98.2 °F (36.8 °C) (Oral)   Resp 17   Ht 1.727 m (5' 7.99\")   Wt 87.1 kg (192 lb)   SpO2 98%   BMI 29.20 kg/m²   Wt Readings from Last 3 Encounters:   07/10/25 87.1 kg (192 lb)   23 92.5 kg (204 lb)   23 93.4 kg (206 lb)     I/O (24 Hours)    Intake/Output Summary (Last 24 hours) at 2025 1009  Last data filed at 2025 0620  Gross per 24 hour   Intake --   Output 1550 ml   Net -1550 ml     Ventilator:      Exam:   Physical Exam   Constitutional: Resting in bed, room air, no acute distress  HENT: Unremarkable  Head: Normocephalic and atraumatic.   Eyes: EOM are normal. Pupils are equal, round, and reactive to light.   Neck: Neck supple.   Cardiovascular:  Regular rate and rhythm.  Normal heart tones.  No JVD.    Pulmonary/Chest: Clear to auscultation  Abdominal: Soft. Bowel sounds are normal. There is no tenderness.   Musculoskeletal: Normal range of motion.  Neurological:patient is alert and oriented to person, place, and time.   Skin: Skin is warm and dry. No rash noted.   Extremities: Mild bilateral lower extremity edema  Infusions:      sodium chloride 25 mL/hr at 25 0324    dextrose

## 2025-07-11 NOTE — CONSULTS
[facility]    Shaukat Rashid MD Zafar Magsi MD Danial Rashid MD John O. Ranker MD Aws Aljanabi MD Sembria Ligibel, [facility]       Nephrology Consultation Note    Reason for Consult:    Date of Service: 07/11/25   PCP: Carl Rogers MD       Reason for Consult     Hypokalemia    Assessment and Plan     Hypokalemia with hx of thiazide diuretics use.    Plan and recommendations:    Potassium supplementation will be given.  Avoid thiazide diuretics.  We may check fractional excretion of potassium (FEK) if need be to evaluate hypokalemia further for renal losses reasons.  Patient does not appear to have hyper-Franck or RTA.  Patient does not have any hypomagnesemia at this time.    Will optimize serum magnesium level and hence will give magnesium supplementation.    Recommend to use spironolactone if need be for hypertension control.    Thank you for this consultation, we will follow along with you regarding care of this patient while the patient is here in the hospital, please call if you have any questions or concerns.    Sathya Krause MD  on 7/11/2025 at 2:03 PM   [facility].  Ph: [phone]     History of presenting illness      Patient seen and examined at the bedside.  History obtained from the patient as well as from the EMR.    74 years old gentleman presented with multiple falls and near syncope.  Per primary team patient admitted with strokelike symptoms, cerebral aneurysm, sepsis workup.  Patient also has a history of rheumatoid arthritis and has been on immunosuppressive medications.  He has history of chronic thrombocytopenia.  We have been asked to see the patient in regard to his hypokalemia.  Patient himself is not a good historian at this time.  Per records patient had hypokalemia intermittently in the past as well.   Per med list patient was on Dyazide diuretics along with low-dose potassium supplementation.

## 2025-07-11 NOTE — CONSULTS
[facility]  Initial Consult Note  Date: 7/11/2025    Hospital day :2     Chief complaint/reason for consultation:   Positive blood culture   COV-2 positive     Assessment/Impression:   Covid-19 infection   Contaminated blood culture  Syncope  Chronic thrombocytopenia  Diabetes mellitus, type II  Hypertension  KATHLEEN-resolved  Prolonged QTc      Plan/Recommendations   Continue with supportive care at this time  Discontinue antimicrobial therapy  Avoid fluoroquinolones-prolonged Qtc    Discussed with Dr. Sood    Infection Control Recommendations   [place] Precautions  Droplet plus isolation    Antimicrobial Stewardship Recommendations   Simplification of therapy  Targeted therapy      History of Present Illness:   Carlos Enrique Padron is a 74 y.o.-year-old male with a history of CKD, hypertension, rheumatoid arthritis receives Remicade infusions and methotrexate.  Patient has also been diagnosed with skin cancer and receives laser therapy.  Patient was initially admitted on 7/8/2025 after an strokelike symptoms.  Per chart review patient fell while taking out the trash and fell again while inside of his home, family brought him to the ER where stroke alert was then called due to symptoms of confusion and delayed speech.  CT workup was negative for stroke. Patient was evaluated by neurology MRI was without any acute infarct or hemorrhage.  Patient reports recent contact with ill persons, he had family members who attended a cruise, felt ill, and visited with patient days prior.    On initial presentation, his labs were unremarkable besides creatinine of 1.5 indicating KATHLEEN, lactic acid 1.9, procalcitonin 0.21.  Patient did endorse some hypotension on initial presentation, all other vitals unremarkable.  Chest x-ray without acute findings but did mention asymmetric elevation of the right hemidiaphragm which pulmonary team was consulted.  Patient was started on IV vancomycin and Zosyn which was switched to

## 2025-07-11 NOTE — PLAN OF CARE
Problem: Safety - Adult  Goal: Free from fall injury  7/11/2025 0314 by Yareli Weston RN  Outcome: Progressing     Problem: Chronic Conditions and Co-morbidities  Goal: Patient's chronic conditions and co-morbidity symptoms are monitored and maintained or improved  7/11/2025 0314 by Yareli Weston RN  Outcome: Progressing     Problem: Discharge Planning  Goal: Discharge to home or other facility with appropriate resources  7/11/2025 0314 by Yareli Weston RN  Outcome: Progressing     Problem: Skin/Tissue Integrity  Goal: Skin integrity remains intact  Description: 1.  Monitor for areas of redness and/or skin breakdown  2.  Assess vascular access sites hourly  3.  Every 4-6 hours minimum:  Change oxygen saturation probe site  4.  Every 4-6 hours:  If on nasal continuous positive airway pressure, respiratory therapy assess nares and determine need for appliance change or resting period  7/11/2025 0314 by Yareli Weston RN  Outcome: Progressing     Problem: ABCDS Injury Assessment  Goal: Absence of physical injury  7/11/2025 0314 by Yareli Weston RN  Outcome: Progressing  Flowsheets (Taken 7/10/2025 2000)  Absence of Physical Injury: Implement safety measures based on patient assessment     Problem: Neurosensory - Adult  Goal: Achieves stable or improved neurological status  7/11/2025 0314 by Yareli Weston RN  Outcome: Progressing     Problem: Cardiovascular - Adult  Goal: Maintains optimal cardiac output and hemodynamic stability  7/11/2025 0314 by Yareli Weston RN  Outcome: Progressing     Problem: Cardiovascular - Adult  Goal: Absence of cardiac dysrhythmias or at baseline  7/11/2025 0314 by Yareli Weston RN  Outcome: Progressing     Problem: Musculoskeletal - Adult  Goal: Return mobility to safest level of function  7/11/2025 0314 by Yareli Weston RN  Outcome: Progressing

## 2025-07-11 NOTE — PROGRESS NOTES
[facility] Internal Medicine   Jonnie Pena MD; MD Radha Montoya MD, MD , Aisha Dorsey MD    [facility] Internal Medicine   IN-PATIENT SERVICE   [facility]    Progress Note            Date:   7/11/2025  Patient name:  Carlos Enrique Padron  Date of admission:  7/8/2025  6:53 PM  MRN:   [id number]  Account:  [id number]  YOB: 1950  PCP:    Carl Rogers MD  Room:   2091/2091-01  Code Status:    Full Code    Chief Complaint:     Chief Complaint   Patient presents with    Fall    Elbow Pain     Left    Abrasion     Left shoulder       History Obtained From:     patient, electronic medical record    History of Present Illness:     Carlos Enrique Padron is a 74 y.o. Non- / non  male who presents with Fall, Elbow Pain (Left), and Abrasion (Left shoulder)   and is admitted to the hospital for the management of Stroke-like symptoms.    74-year-old male presented with multiple falls.  Initial fall while taking trash out fell on the grass unsure if it was a syncopal event.  Helped by his family members to get back into the house, another syncopal event.  Denies hitting his head.  Came into the ED.  Patient does have slow speech at baseline.  Stroke alert was called in the ED.  Imaging was done, telestroke recommended neuro evaluation and transferred to [facility] for evaluation and aneurysm.  But due to unavailability, patient will be monitored here with close neurology follow-up.    Past Medical History:     Past Medical History:   Diagnosis Date    Borderline diabetic     Cerebral aneurysm 7/9/2025    Chronic renal disease, stage III (HCC) [183565] 05/31/2022    History of colonoscopy 12/14/2018    multiple polyps,tubular adenoma,diverticulosis    Hyperlipidemia     Hypertension     Osteoarthritis rheumatoid arthritis    6/12    RA (rheumatoid arthritis) (HCC)     Type 2 diabetes mellitus without complication (HCC)          Past Surgical History:     Past Surgical History:   Procedure Laterality Date    COLONOSCOPY N/A 12/14/2018    multiple polyps,tubular adenoma,diverticulosis    SKIN BIOPSY  01/11/2022    on scalp    TONSILLECTOMY AND ADENOIDECTOMY          Medications Prior to Admission:     Prior to Admission medications    Medication Sig Start Date End Date Taking? Authorizing Provider   magnesium oxide (MAG-OX) 400 (240 Mg) MG tablet Take 1 tablet by mouth daily   Yes Jagjit Edmonds MD   simvastatin (ZOCOR) 10 MG tablet TAKE 1 TABLET NIGHTLY 8/7/23  Yes Pola Diego MD   JANUVIA 50 MG tablet TAKE 1 TABLET DAILY 4/5/23  Yes Pola Diego MD   triamterene-hydroCHLOROthiazide (DYAZIDE) 37.5-25 MG per capsule TAKE 1 CAPSULE DAILY 2/23/23  Yes Pola Diego MD   losartan (COZAAR) 100 MG tablet TAKE 1 TABLET DAILY 2/23/23  Yes Pola Diego MD   potassium chloride (MICRO-K) 10 MEQ extended release capsule TAKE 1 CAPSULE TWICE A DAY 2/23/23  Yes Pola Diego MD   amLODIPine (NORVASC) 5 MG tablet TAKE 1 TABLET DAILY 2/23/23  Yes Pola Diego MD   atenolol (TENORMIN) 50 MG tablet TAKE ONE AND ONE-HALF TABLETS DAILY 2/23/23  Yes Pola Diego MD   Niacin (VITAMIN B-3 PO) Take by mouth   Yes Jagjit Edmonds MD   vitamin D (CHOLECALCIFEROL) 1000 UNIT TABS tablet Take 1 tablet by mouth daily   Yes Jagjit Edmonds MD   folic acid (FOLVITE) 1 MG tablet Take 1 tablet by mouth daily   Yes Jagjit Edmonds MD   methotrexate (RHEUMATREX) 2.5 MG chemo tablet Take 1 tablet by mouth once a week 10 tabs weekly   Yes Jagjit Edmonds MD   inFLIXimab (REMICADE) 100 MG injection Infuse 5 mg/kg intravenously See Admin Instructions Every 8 weeks   Yes Jagjit Edmonds MD   blood glucose monitor strips Test blood sugar once daily, one touch teststrips 7/18/23   Pola Diego MD   ONE TOUCH ULTRASOFT LANCETS MISC 1 each by Does not apply route daily 7/2/20   Pola Diego MD   [facility]  LANCETS FINE MISC DX:E11.65 PATIENT TO TEST 2-3 TIMES DAILY 8/14/18   Jagjit Edmonds MD   fluticasone (FLONASE) 50 MCG/ACT nasal spray 1 spray by Nasal route daily    Jagjit Edmonds MD   loratadine (CLARITIN) 10 MG tablet Take 1 tablet by mouth daily as needed  Patient not taking: Reported on 7/9/2025    ProviderJagjit MD        Allergies:     Patient has no known allergies.    Social History:     Tobacco:    reports that he has never smoked. He has never used smokeless tobacco.  Alcohol:      reports no history of alcohol use.  Drug Use:  reports no history of drug use.    Family History:     Family History   Problem Relation Age of Onset    Other Mother         mild dysplasia    Immune Disorder Mother     Cancer Father         throat, lung       Review of Systems:     Positive and Negative as described in HPI.    CONSTITUTIONAL:  negative for fevers, chills, sweats, fatigue, weight loss  HEENT:  negative for vision, hearing changes, runny nose, throat pain  RESPIRATORY:  negative for shortness of breath, cough, congestion, wheezing  CARDIOVASCULAR:  negative for chest pain, palpitations  GASTROINTESTINAL:  negative for nausea, vomiting, diarrhea, constipation, change in bowel habits, abdominal pain   GENITOURINARY:  negative for difficulty of urination, burning with urination, frequency   INTEGUMENT:  negative for rash, skin lesions, easy bruising   HEMATOLOGIC/LYMPHATIC:  negative for swelling/edema   ALLERGIC/IMMUNOLOGIC:  negative for urticaria , itching  ENDOCRINE:  negative increase in drinking, increase in urination, hot or cold intolerance  MUSCULOSKELETAL:  negative joint pains, muscle aches, swelling of joints  NEUROLOGICAL:  negative for headaches, dizziness, lightheadedness, numbness, pain, tingling extremities  BEHAVIOR/PSYCH:  negative for depression, anxiety    Physical Exam:   BP (!) 142/79   Pulse 86   Temp 98.2 °F (36.8 °C) (Oral)   Resp 17   Ht 1.727 m (5' 7.99\")   Wt

## 2025-07-11 NOTE — PROGRESS NOTES
[facility]  [address]  [place] [address]  PH:  [phone] or [phone]  FAX:  [phone]            Brief history: Carlos Enrique Padron is a 74 y.o. old male admitted on 2025 with  fall.      Subjective: Patient is COVID positive. Orthostatics are also positive. EEG pending. Mri brain showed NAP and white matter changes.      Objective: BP (!) 142/79   Pulse 86   Temp 98.2 °F (36.8 °C) (Oral)   Resp 17   Ht 1.727 m (5' 7.99\")   Wt 87.1 kg (192 lb)   SpO2 98%   BMI 29.20 kg/m²       Medications:    aspirin  81 mg Oral Daily    Or    aspirin  300 mg Rectal Daily    enoxaparin  40 mg SubCUTAneous Daily    dextromethorphan  60 mg Oral 2 times per day    guaiFENesin  600 mg Oral BID    alogliptin  6.25 mg Oral Daily    sodium chloride flush  5-40 mL IntraVENous 2 times per day    atorvastatin  80 mg Oral Nightly    insulin lispro  0-4 Units SubCUTAneous 4x Daily AC & HS          Physical Exam:   BP (!) 142/79   Pulse 86   Temp 98.2 °F (36.8 °C) (Oral)   Resp 17   Ht 1.727 m (5' 7.99\")   Wt 87.1 kg (192 lb)   SpO2 98%   BMI 29.20 kg/m²   Temp (24hrs), Av °F (36.7 °C), Min:97.6 °F (36.4 °C), Max:98.6 °F (37 °C)        General examination:      General Appearance:  alert, well appearing, and in no acute distress  HEENT: Normocephalic, atraumatic, moist mucus membranes  Neck: supple, no carotid bruits, (-) nuchal rigidity  Lungs:  Respirations unlabored, chest wall no deformity, BS normal  Cardiovascular: normal rate, regular rhythm  Abdomen: Soft, nontender, nondistended, normal bowel sounds  Skin: No gross lesions, rashes, bruising or bleeding on exposed skin area  Extremities:  peripheral pulses palpable, no cyanosis, clubbing or edema  Psych: normal affect      Neurological examination:    Mental status   Alert and oriented x 3; following all commands; speech is fluent, no dysarthria, aphasia. Slowed speech ( family and patient state this is baseline  TISSUES/SKULL:  No acute abnormality of the visualized skull or soft  tissues.    Impression  No acute intracranial abnormality.    The findings were sent to the Radiology Results Communication [facility] at 8:52  pm on 7/8/2025 to be communicated to a licensed caregiver.      I personally reviewed all of the above medications, clinical laboratory, imaging and other diagnostic tests.       Impression:       74 year old male who presented with fall and syncope- per family patient was hypotensive during episodes. Patient orthostatics are positive  COVID positive  3.5 mm aneurysm at the bifurcation of an azygous A 2 segment. Attempted transfer to [facility] for [facility] but not beds available  HTN, HLD  Falls     Plan:      [facility] outpatient follow-up for aneurysm  EEG pending  Discussed with wife at bedside   Orthostatics are positive during admission   Discussed with RN  Neuro-checks  MRI Brain showed NAP and white matter changes  Remaining management per primary team  Lipid panel, [id number]  Telemetry  ASA  Attempted transfer for [facility] but no bed availability, will need outpatient follow-up     This note is created with the assistance of a speech-recognition program. While intending to generate a document that actually reflects the content of the visit, the document can still have some errors including those of syntax and sound a- like substitutions which may escape proofreading.  In such instances, actual meaning can be extrapolated by contextual derivation.

## 2025-07-11 NOTE — PROGRESS NOTES
[facility]   Pharmacy Pharmacokinetic Monitoring Service - Vancomycin    Consulting Provider: Sunita   Indication: Bacteremia  Target Concentration: Goal AUC/BRANDIE 400-600 mg*hr/L  Day of Therapy: 2  Additional Antimicrobials: Rocephin    Pertinent Laboratory Values:   Wt Readings from Last 1 Encounters:   07/10/25 87.1 kg (192 lb)     Temp Readings from Last 1 Encounters:   07/11/25 97.9 °F (36.6 °C) (Oral)     Estimated Creatinine Clearance: 90 mL/min (based on SCr of 0.8 mg/dL).  Recent Labs     07/10/25  0557 07/11/25  0632   CREATININE 0.9 0.8   BUN 10 7*   WBC 3.3* 2.7*     Procalcitonin:      Pertinent Cultures:  Culture Date Source Results   7/10  7/10  7/10  7/10  7/10 Blood x 2  Urine  MRSA Nasal  Resp Panel   Sputum Pending  Neg  Pending  Covid  Pending   MRSA Nasal Swab: N/A. Non-respiratory infection.    Recent vancomycin administrations                     vancomycin (VANCOCIN) 1,000 mg in sodium chloride 0.9 % 250 mL IVPB ([id number]) (mg) 1,000 mg New Bag 07/11/25 0326     1,000 mg New Bag 07/10/25 1758    vancomycin (VANCOCIN) 2,250 mg in sodium chloride 0.9 % 500 mL IVPB (mg) 2,250 mg New Bag 07/08/25 2221                    Assessment:  Date/Time Current Dose Concentration Timing of Concentration (h) AUC   7/11 1000 mg 19.6 0632 478   Note: Serum concentrations collected for AUC dosing may appear elevated if collected in close proximity to the dose administered, this is not necessarily an indication of toxicity    Plan:  Current dosing regimen is therapeutic  Continue current dose  Pharmacy will continue to monitor patient and adjust therapy as indicated  Loading dose: N/A  Regimen: 1000 mg IV every 12 hours.  Start time: 15:26 on 07/11/2025  Exposure target: AUC24 (range) 400-600 mg/L.hr   [id number]: 452 mg/L.hr  AUC24,ss: 478 mg/L.hr  Probability of AUC24 > 400: 85 %  Ctrough,ss: 13.9 mg/L  Probability of Ctrough,ss > 20: 6 %    Thank you for the consult,  Jony Puente,  [facility]  7/11/2025 7:39 AM

## 2025-07-11 NOTE — CARE COORDINATION
Case Management   Daily Progress Note       Patient Name: Carlos Enrique Padron                   YOB: 1950  Diagnosis: Aneurysm [I72.9]  Stroke-like symptoms [R29.90]  Symptoms of cerebrovascular accident [R09.89]  Fall, initial encounter [W19.XXXA]  Acute hypotension [I95.9]  Cerebral aneurysm [I67.1]                       GMLOS: 3 days  Length of Stay: 2  days    Readmission Risk (Low < 19, Mod (19-27), High > 27): Readmission Risk Score: 12.9      Patient is alert and oriented. Covid positive, afebrile and on room air.    Spoke with Patient, and Current Transitional Plan is:    [x] Home Independently. Declined VNS. Wife at bedside and agreeable.    [] Home with HC    [] Skilled Nursing Facility    [] Acute Rehabilitation    [] Long Term Acute Care (LTAC)    [] Other:     Additional Notes:     Active order for IV Rocephin and IV Vanco.    EEG ordered.    Echo completed. EF 55-60%.    Electronically signed by Cammy Pereira RN on 7/11/2025 at 2:02 PM

## 2025-07-11 NOTE — PROGRESS NOTES
[facility]   INPATIENT OCCUPATIONAL THERAPY  PROGRESS NOTE  Date: 2025  Patient Name: Carlos Enrique Padron       Room:   MRN: [id number]    : 1950  (74 y.o.)  Gender: male   Referring Practitioner: Anh Dorsey MD  Diagnosis: stroke like symptoms      Discharge Recommendations:  Further Occupational Therapy is recommended upon facility discharge.    OT Equipment Recommendations  Other: TBD    Restrictions/Precautions  Restrictions/Precautions  Restrictions/Precautions: Fall Risk;Bed Alarm;Isolation;Other (Comment) (Droplet Plus precautions due to COVID Positive)  Activity Level: Up as Tolerated  Required Braces or Orthoses?: No  Position Activity Restriction  Other Position/Activity Restrictions: Speech therapy recommendations:  regular diet w/ thin liquids    O2 Device: None (Room air)    Subjective  Subjective  Subjective: pt supine and agreeable to participate in OT tx session this afternoon. \" I think I need to get up anyway to head into the bathroom now\".  Pain  Pre-Pain: 0  Post-Pain: 0  Comments: NASIM melo pt to be seen by OT this date.    Objective  Orientation  Overall Orientation Status: Within Functional Limits    Activities of Daily Living  Grooming: Contact guard assistance  Grooming Skilled Clinical Factors: Pt required CGA for balance while standing at the sink to wash and dry his hands.  Toileting: Minimal assistance  Toileting Skilled Clinical Factors: Pt required VANDANA to maintain standing balance while standing at the toilet to urinate.    Balance  Balance  Sitting Balance: Stand by assistance  Standing Balance: Minimal assistance  Standing Balance  Time: 5-6 minutes  Activity: func transfers, fun mob, toileting (standing), standing at the sink.    Transfers/Mobility  Bed mobility  Rolling to Right: Stand by assistance  Supine to Sit: Minimal assistance  Sit to Supine: Stand by assistance  Scooting: Stand by assistance  Bed Mobility Comments:  Warm

## 2025-07-11 NOTE — PROGRESS NOTES
Today's Date: 7/11/2025  Patient Name: Carlos Enrique Padron  Date of admission: 7/8/2025  6:53 PM  Patient's age: 74 y.o., 1950  Admission Dx: Aneurysm [I72.9]  Stroke-like symptoms [R29.90]  Symptoms of cerebrovascular accident [R09.89]  Fall, initial encounter [W19.XXXA]  Acute hypotension [I95.9]  Cerebral aneurysm [I67.1]    Reason for Consult: management recommendations  Requesting Physician: Brant Goins MD    CHIEF COMPLAINT: Falls, thrombocytopenia    INTERIM HISTORY  Patient seen and evaluated.  Continues to have orthostatic hypotension and dizziness.  Appreciate primary care input.  Needs better control of the blood sugar.  Positive blood culture, contaminant   COVID positive   HISTORY OF PRESENT ILLNESS:      The patient is a 74 y.o.   male who is admitted to the hospital after a fall and syncopal episode.  The patient has history of rheumatoid arthritis and chronic thrombocytopenia and he follows with rheumatology Dr. Jacobs.      Past Medical History:   has a past medical history of Borderline diabetic, Cerebral aneurysm, Chronic renal disease, stage III ([facility]) [823039], History of colonoscopy, Hyperlipidemia, Hypertension, Osteoarthritis, RA (rheumatoid arthritis) ([facility]), and Type 2 diabetes mellitus without complication ([facility]).    Past Surgical History:   has a past surgical history that includes Tonsillectomy and adenoidectomy; Colonoscopy (N/A, 12/14/2018); and skin biopsy (01/11/2022).     Medications:    Reviewed in Epic     Allergies:  Patient has no known allergies.    Social History:   reports that he has never smoked. He has never used smokeless tobacco. He reports that he does not drink alcohol and does not use drugs.     Family History: family history includes Cancer in his father; Immune Disorder in his mother; Other in his mother.    REVIEW OF SYSTEMS:    Constitutional: No fever or chills. No night sweats, no weight loss   Eyes: No eye discharge, double vision, or eye pain   HEENT: negative

## 2025-07-11 NOTE — PROGRESS NOTES
EEG notified that dr corado wants the eeg done today. They stated they would try and leave a note for the doctor

## 2025-07-12 VITALS
BODY MASS INDEX: 29.1 KG/M2 | HEART RATE: 88 BPM | OXYGEN SATURATION: 95 % | HEIGHT: 68 IN | RESPIRATION RATE: 18 BRPM | WEIGHT: 192 LBS | SYSTOLIC BLOOD PRESSURE: 136 MMHG | TEMPERATURE: 97.7 F | DIASTOLIC BLOOD PRESSURE: 77 MMHG

## 2025-07-12 PROBLEM — R55 SYNCOPE: Status: ACTIVE | Noted: 2025-07-12

## 2025-07-12 PROBLEM — R09.89: Status: ACTIVE | Noted: 2025-07-09

## 2025-07-12 LAB
ANION GAP SERPL CALCULATED.3IONS-SCNC: 10 MMOL/L (ref 9–16)
BUN SERPL-MCNC: 6 MG/DL (ref 8–23)
CALCIUM SERPL-MCNC: 7.7 MG/DL (ref 8.6–10.4)
CHLORIDE SERPL-SCNC: 110 MMOL/L (ref 98–107)
CO2 SERPL-SCNC: 21 MMOL/L (ref 20–31)
CREAT SERPL-MCNC: 0.7 MG/DL (ref 0.7–1.2)
GFR, ESTIMATED: >90 ML/MIN/1.73M2
GLUCOSE BLD-MCNC: 137 MG/DL (ref 75–110)
GLUCOSE BLD-MCNC: 177 MG/DL (ref 75–110)
GLUCOSE SERPL-MCNC: 144 MG/DL (ref 74–99)
MAGNESIUM SERPL-MCNC: 1.8 MG/DL (ref 1.6–2.4)
POTASSIUM SERPL-SCNC: 3.5 MMOL/L (ref 3.7–5.3)
POTASSIUM SERPL-SCNC: 4.1 MMOL/L (ref 3.7–5.3)
SODIUM SERPL-SCNC: 141 MMOL/L (ref 136–145)

## 2025-07-12 PROCEDURE — 84132 ASSAY OF SERUM POTASSIUM: CPT

## 2025-07-12 PROCEDURE — 97110 THERAPEUTIC EXERCISES: CPT

## 2025-07-12 PROCEDURE — 36415 COLL VENOUS BLD VENIPUNCTURE: CPT

## 2025-07-12 PROCEDURE — 99232 SBSQ HOSP IP/OBS MODERATE 35: CPT | Performed by: PSYCHIATRY & NEUROLOGY

## 2025-07-12 PROCEDURE — 80048 BASIC METABOLIC PNL TOTAL CA: CPT

## 2025-07-12 PROCEDURE — G0545 PR INHERENT VISIT TO INPT: HCPCS | Performed by: INTERNAL MEDICINE

## 2025-07-12 PROCEDURE — 97116 GAIT TRAINING THERAPY: CPT

## 2025-07-12 PROCEDURE — 82947 ASSAY GLUCOSE BLOOD QUANT: CPT

## 2025-07-12 PROCEDURE — 83735 ASSAY OF MAGNESIUM: CPT

## 2025-07-12 PROCEDURE — 99239 HOSP IP/OBS DSCHRG MGMT >30: CPT

## 2025-07-12 PROCEDURE — 95819 EEG AWAKE AND ASLEEP: CPT | Performed by: PSYCHIATRY & NEUROLOGY

## 2025-07-12 PROCEDURE — 6360000002 HC RX W HCPCS

## 2025-07-12 PROCEDURE — 99232 SBSQ HOSP IP/OBS MODERATE 35: CPT | Performed by: INTERNAL MEDICINE

## 2025-07-12 PROCEDURE — 2500000003 HC RX 250 WO HCPCS

## 2025-07-12 PROCEDURE — 6370000000 HC RX 637 (ALT 250 FOR IP)

## 2025-07-12 PROCEDURE — 6370000000 HC RX 637 (ALT 250 FOR IP): Performed by: SPECIALIST

## 2025-07-12 RX ORDER — ATORVASTATIN CALCIUM 80 MG/1
80 TABLET, FILM COATED ORAL NIGHTLY
Qty: 30 TABLET | Refills: 3 | Status: SHIPPED | OUTPATIENT
Start: 2025-07-12

## 2025-07-12 RX ORDER — ASPIRIN 81 MG/1
81 TABLET, CHEWABLE ORAL DAILY
Qty: 30 TABLET | Refills: 3 | Status: SHIPPED | OUTPATIENT
Start: 2025-07-13

## 2025-07-12 RX ADMIN — ENOXAPARIN SODIUM 40 MG: 100 INJECTION SUBCUTANEOUS at 08:56

## 2025-07-12 RX ADMIN — Medication 60 MG: at 08:55

## 2025-07-12 RX ADMIN — SODIUM CHLORIDE, PRESERVATIVE FREE 10 ML: 5 INJECTION INTRAVENOUS at 08:55

## 2025-07-12 RX ADMIN — POTASSIUM CHLORIDE 40 MEQ: 1500 TABLET, EXTENDED RELEASE ORAL at 08:55

## 2025-07-12 RX ADMIN — GUAIFENESIN 600 MG: 600 TABLET ORAL at 08:55

## 2025-07-12 RX ADMIN — POTASSIUM BICARBONATE 25 MEQ: 978 TABLET, EFFERVESCENT ORAL at 08:56

## 2025-07-12 RX ADMIN — ALOGLIPTIN 6.25 MG: 6.25 TABLET, FILM COATED ORAL at 08:55

## 2025-07-12 ASSESSMENT — ENCOUNTER SYMPTOMS
COUGH: 1
COLOR CHANGE: 1

## 2025-07-12 NOTE — CARE COORDINATION
IMM letter provided to patient.  Patient offered four hours to make informed decision regarding appeal process; patient agreeable to discharge.     Electronically signed by Cammy Pereira RN on 7/12/2025 at 4:52 PM

## 2025-07-12 NOTE — PROCEDURES
PROCEDURE NOTE  Date: 7/12/2025   Name: Carlos Enrique Padron  YOB: 1950    Procedures            Date:  Referring physician:     Indication  Patient aged 74 y with AMS. EEG done to assess for epileptiform activity.    Introduction  This routine 30-minute EEG was recorded using the International 10-20 System on a [employer] workstation at 256 samples/s. Automated spike and seizure detection algorithms were applied.    Description  During the maximal alert state, a well-regulated, symmetric, and reactive 7-8 Hz posterior dominant rhythm was seen. No consistent focal slowing or interhemispheric asymmetry was noted. Stage I and stage II sleep were observed. There were no interictal epileptiform discharges or electrographic seizures.    Activations  Hyperventilation was not performed. Intermittent photic stimulation was performed and demonstrated no posterior driving response.    Impression  Abnormal awake EEG. The slowing mentioned above suggests mild non specific encephalopathy.     No epileptiform discharges were identified. Please note the absence of such activity on this record cannot conclusively rule out an epileptic disorder. If such is still clinically suspected, a repeat study with sleep deprivation and/or prolonged sampling may be helpful.    Mele Darnell MD  Epilepsy Board Certified.  Neurology Board Certified.    Electronically Signed

## 2025-07-12 NOTE — PROGRESS NOTES
Writer reached out to Dr. Darnell to see if the EEG could be read so that the pt could be discharged but stated that it was not on the . Left a message to vascular lab inquiring for eeg report.

## 2025-07-12 NOTE — PROGRESS NOTES
Writer messaged Dr. Nicolas, \"Was unable to get ahold of nephrology due to their systems have been down. He was on continuous fluids with 60 meq of potassium. K 3.5, he recieved 40 meq this morning plus scheduled 25 meq tab. Pharmacy was questioning continuing the 60 potassium fluids longer than 24 hours. Would you like these to continue?\"    5506    Dr. Nicolas states, \"Lets recheck another potassium. If thats better. Can dc the fluids \"      3892  Writer notified  of potassium 4.1 and that nephro, neuro,pulm, and onc all good with discharge. He let me know that the pt will discharge today.

## 2025-07-12 NOTE — PROGRESS NOTES
Writer messaged  inquiring about if the eeg results were withholding the pt from discharging.  responded and said, \"No im not holding discharge and im working on getting it read.\"

## 2025-07-12 NOTE — CARE COORDINATION
Case Management   Daily Progress Note       Patient Name: Carlos Enrique Padron                   YOB: 1950  Diagnosis: Aneurysm [I72.9]  Stroke-like symptoms [R29.90]  Symptoms of cerebrovascular accident [R09.89]  Fall, initial encounter [W19.XXXA]  Acute hypotension [I95.9]  Cerebral aneurysm [I67.1]                       GMLOS: 3 days  Length of Stay: 3  days    Readmission Risk (Low < 19, Mod (19-27), High > 27): Readmission Risk Score: 11.4      Chart Reviewed, and Current Transitional Plan is:    [x] Home Independently. Confirmed this with patient and spouse yesterday.    [] Home with HC    [] Skilled Nursing Facility    [] Acute Rehabilitation    [] Long Term Acute Care (LTAC)    [] Other:     Additional Notes:     Possible d/c home today pending EEG results.    Electronically signed by Cammy Pereira RN on 7/12/2025 at 12:45 PM

## 2025-07-12 NOTE — PROGRESS NOTES
[facility] PULMONARY,CRITICAL CARE & SLEEP   Carlos Enrique Murcia MD/Jose A Sargent MD/Jamel GARAY AGAP-BC, NP-C      Abeba GARAY NP-C    Rigoberto GARAY NP-C                                         Pulmonary Progress Note    Patient - Carlos Enrique Padron   Age - 74 y.o.   - 1950  MRN - [id number]  [facility] # - [id number]  Date of Admission - 2025  6:53 PM    Consulting Service/Physician:       Primary Care Physician: Carl Rogers MD    SUBJECTIVE:     Chief Complaint:   Chief Complaint   Patient presents with    Fall    Elbow Pain     Left    Abrasion     Left shoulder     Subjective:    Patient resting in bed  Remains on room air with no acute complaint    VITALS  /77   Pulse 88   Temp 97.7 °F (36.5 °C) (Oral)   Resp 18   Ht 1.727 m (5' 7.99\")   Wt 87.1 kg (192 lb)   SpO2 95%   BMI 29.20 kg/m²   Wt Readings from Last 3 Encounters:   07/10/25 87.1 kg (192 lb)   23 92.5 kg (204 lb)   23 93.4 kg (206 lb)     I/O (24 Hours)    Intake/Output Summary (Last 24 hours) at 2025 1213  Last data filed at 2025 0820  Gross per 24 hour   Intake --   Output 850 ml   Net -850 ml     Ventilator:      Exam:   Physical Exam   Constitutional: Resting in bed, room air, no acute distress  HENT: Unremarkable  Head: Normocephalic and atraumatic.   Eyes: EOM are normal. Pupils are equal, round, and reactive to light.   Neck: Neck supple.   Cardiovascular:  Regular rate and rhythm.  Normal heart tones.  No JVD.    Pulmonary/Chest: Remains clear  Abdominal: Soft. Bowel sounds are normal. There is no tenderness.   Musculoskeletal: Normal range of motion.  Neurological:patient is alert and oriented to person, place, and time.   Skin: Skin is warm and dry. No rash noted.   Extremities: Mild bilateral lower extremity edema  Infusions:      potassium chloride 60 mEq in sodium chloride 0.9 % 1,000 mL infusion 50 mL/hr at 25 1459    sodium chloride

## 2025-07-12 NOTE — PROGRESS NOTES
[facility] Neurology Specialist  [id number]  [place] [address]  PH:  [phone] or [phone]  FAX:  [phone]            Brief history: Carlos Enrique Padron is a 74 y.o. old male admitted on 2025 with  fall.      Subjective: Patient is COVID positive. Orthostatics are also positive. EEG pending. Mri brain showed NAP and white matter changes.      Objective: /77   Pulse 88   Temp 98.2 °F (36.8 °C) (Oral)   Resp 18   Ht 1.727 m (5' 7.99\")   Wt 87.1 kg (192 lb)   SpO2 95%   BMI 29.20 kg/m²       Medications:    potassium bicarbonate  25 mEq Oral BID    aspirin  81 mg Oral Daily    Or    aspirin  300 mg Rectal Daily    enoxaparin  40 mg SubCUTAneous Daily    dextromethorphan  60 mg Oral 2 times per day    guaiFENesin  600 mg Oral BID    alogliptin  6.25 mg Oral Daily    sodium chloride flush  5-40 mL IntraVENous 2 times per day    atorvastatin  80 mg Oral Nightly    insulin lispro  0-4 Units SubCUTAneous 4x Daily AC & HS          Physical Exam:   /77   Pulse 88   Temp 98.2 °F (36.8 °C) (Oral)   Resp 18   Ht 1.727 m (5' 7.99\")   Wt 87.1 kg (192 lb)   SpO2 95%   BMI 29.20 kg/m²   Temp (24hrs), Av.1 °F (36.7 °C), Min:97.7 °F (36.5 °C), Max:98.6 °F (37 °C)        General examination:      General Appearance:  alert, well appearing, and in no acute distress  HEENT: Normocephalic, atraumatic, moist mucus membranes  Neck: supple, no carotid bruits, (-) nuchal rigidity  Lungs:  Respirations unlabored, chest wall no deformity, BS normal  Cardiovascular: normal rate, regular rhythm  Abdomen: Soft, nontender, nondistended, normal bowel sounds  Skin: No gross lesions, rashes, bruising or bleeding on exposed skin area  Extremities:  peripheral pulses palpable, no cyanosis, clubbing or edema  Psych: normal affect      Neurological examination:    Mental status   Alert and oriented x 3; following all commands; speech is fluent, no dysarthria, aphasia. Slowed speech ( family  of the visualized skull or soft  tissues.    Impression  No acute intracranial abnormality.    The findings were sent to the [facility] at 8:52  pm on 7/8/2025 to be communicated to a licensed caregiver.      I personally reviewed all of the above medications, clinical laboratory, imaging and other diagnostic tests.       Impression:       74 year old male who presented with fall and syncope- per family patient was hypotensive during episodes. Patient orthostatics are positive  COVID positive  3.5 mm aneurysm at the bifurcation of an azygous A 2 segment. Attempted transfer to [facility] for [facility] but not beds available  HTN, HLD  Falls     Plan:      [facility] outpatient follow-up for aneurysm  EEG is completed but not downloaded onto the - attempting to reach out and correct this   Discussed with wife at bedside   Orthostatics are positive during admission   Discussed with RN  Neuro-checks  MRI Brain showed NAP and white matter changes  Remaining management per primary team  Lipid panel, [id number]  Telemetry  ASA  Attempted transfer for [facility] but no bed availability, will need outpatient follow-up     This note is created with the assistance of a speech-recognition program. While intending to generate a document that actually reflects the content of the visit, the document can still have some errors including those of syntax and sound a- like substitutions which may escape proofreading.  In such instances, actual meaning can be extrapolated by contextual derivation.

## 2025-07-12 NOTE — PLAN OF CARE
Problem: Safety - Adult  Goal: Free from fall injury  Outcome: Progressing     Problem: Chronic Conditions and Co-morbidities  Goal: Patient's chronic conditions and co-morbidity symptoms are monitored and maintained or improved  Outcome: Progressing     Problem: Discharge Planning  Goal: Discharge to home or other facility with appropriate resources  Outcome: Progressing     Problem: Skin/Tissue Integrity  Goal: Skin integrity remains intact  Description: 1.  Monitor for areas of redness and/or skin breakdown  2.  Assess vascular access sites hourly  3.  Every 4-6 hours minimum:  Change oxygen saturation probe site  4.  Every 4-6 hours:  If on nasal continuous positive airway pressure, respiratory therapy assess nares and determine need for appliance change or resting period  Outcome: Progressing     Problem: ABCDS Injury Assessment  Goal: Absence of physical injury  Outcome: Progressing     Problem: Neurosensory - Adult  Goal: Achieves stable or improved neurological status  Outcome: Progressing     Problem: Cardiovascular - Adult  Goal: Maintains optimal cardiac output and hemodynamic stability  Outcome: Progressing     Problem: Cardiovascular - Adult  Goal: Absence of cardiac dysrhythmias or at baseline  Outcome: Progressing     Problem: Musculoskeletal - Adult  Goal: Return mobility to safest level of function  Outcome: Progressing

## 2025-07-12 NOTE — PROGRESS NOTES
[facility] Internal Medicine   Jonnie Pena MD; MD Radha Montoya MD, MD , Aisha Dorsey MD    [facility] Internal Medicine   IN-PATIENT SERVICE   [facility]    Progress Note            Date:   7/12/2025  Patient name:  Carlos Enrique Padron  Date of admission:  7/8/2025  6:53 PM  MRN:   [id number]  Account:  [id number]  YOB: 1950  PCP:    Carl Rogers MD  Room:   2091/2091-01  Code Status:    Full Code    Chief Complaint:     Chief Complaint   Patient presents with    Fall    Elbow Pain     Left    Abrasion     Left shoulder       History Obtained From:     patient, electronic medical record    History of Present Illness:     Carlos Enrique Padron is a 74 y.o. Non- / non  male who presents with Fall, Elbow Pain (Left), and Abrasion (Left shoulder)   and is admitted to the hospital for the management of Stroke-like symptoms.    74-year-old male presented with multiple falls.  Initial fall while taking trash out fell on the grass unsure if it was a syncopal event.  Helped by his family members to get back into the house, another syncopal event.  Denies hitting his head.  Came into the ED.  Patient does have slow speech at baseline.  Stroke alert was called in the ED.  Imaging was done, telestroke recommended neuro evaluation and transferred to [facility] for evaluation and aneurysm.  But due to unavailability, patient will be monitored here with close neurology follow-up.    Past Medical History:     Past Medical History:   Diagnosis Date    Borderline diabetic     Cerebral aneurysm 7/9/2025    Chronic renal disease, stage III (HCC) [120117] 05/31/2022    History of colonoscopy 12/14/2018    multiple polyps,tubular adenoma,diverticulosis    Hyperlipidemia     Hypertension     Osteoarthritis rheumatoid arthritis    6/12    RA (rheumatoid arthritis) (HCC)     Type 2 diabetes mellitus without complication (HCC)          Past Surgical History:     Past Surgical History:   Procedure Laterality Date    COLONOSCOPY N/A 12/14/2018    multiple polyps,tubular adenoma,diverticulosis    SKIN BIOPSY  01/11/2022    on scalp    TONSILLECTOMY AND ADENOIDECTOMY          Medications Prior to Admission:     Prior to Admission medications    Medication Sig Start Date End Date Taking? Authorizing Provider   magnesium oxide (MAG-OX) 400 (240 Mg) MG tablet Take 1 tablet by mouth daily   Yes Jagjit Edmonds MD   simvastatin (ZOCOR) 10 MG tablet TAKE 1 TABLET NIGHTLY 8/7/23  Yes Pola Diego MD   JANUVIA 50 MG tablet TAKE 1 TABLET DAILY 4/5/23  Yes Pola Diego MD   triamterene-hydroCHLOROthiazide (DYAZIDE) 37.5-25 MG per capsule TAKE 1 CAPSULE DAILY 2/23/23  Yes Pola Diego MD   losartan (COZAAR) 100 MG tablet TAKE 1 TABLET DAILY 2/23/23  Yes Pola Diego MD   potassium chloride (MICRO-K) 10 MEQ extended release capsule TAKE 1 CAPSULE TWICE A DAY 2/23/23  Yes Pola Diego MD   amLODIPine (NORVASC) 5 MG tablet TAKE 1 TABLET DAILY 2/23/23  Yes Pola Diego MD   atenolol (TENORMIN) 50 MG tablet TAKE ONE AND ONE-HALF TABLETS DAILY 2/23/23  Yes Pola Diego MD   Niacin (VITAMIN B-3 PO) Take by mouth   Yes Jagjit Edmonds MD   vitamin D (CHOLECALCIFEROL) 1000 UNIT TABS tablet Take 1 tablet by mouth daily   Yes Jagjit Edmonds MD   folic acid (FOLVITE) 1 MG tablet Take 1 tablet by mouth daily   Yes Jagjit Edmonds MD   methotrexate (RHEUMATREX) 2.5 MG chemo tablet Take 1 tablet by mouth once a week 10 tabs weekly   Yes Jagjit Edmonds MD   inFLIXimab (REMICADE) 100 MG injection Infuse 5 mg/kg intravenously See Admin Instructions Every 8 weeks   Yes Jagjit Edmonds MD   blood glucose monitor strips Test blood sugar once daily, one touch teststrips 7/18/23   Pola Diego MD   ONE TOUCH ULTRASOFT LANCETS MISC 1 each by Does not apply route daily 7/2/20   Pola Diego MD   [facility]

## 2025-07-12 NOTE — PROGRESS NOTES
[facility]   Physical Therapy Treatment  Date: 25  Patient Name: Carlos Enrique Padron       Room: -  MRN: [id number]  Account: [id number]   : 1950  (74 y.o.) Gender: male     Discharge Recommendations:  Discharge Recommendations: Continue to assess pending progress, Patient would benefit from continued therapy after discharge, Therapy recommended at discharge     PT Equipment Recommendations  Equipment Needed: No (has cane and wheeled walker)         Past Medical History:  has a past medical history of Borderline diabetic, Cerebral aneurysm, Chronic renal disease, stage III (HCC) [588372], History of colonoscopy, Hyperlipidemia, Hypertension, Osteoarthritis, RA (rheumatoid arthritis) ([facility]), and Type 2 diabetes mellitus without complication ([facility]).  Past Surgical History:   has a past surgical history that includes Tonsillectomy and adenoidectomy; Colonoscopy (N/A, 2018); and skin biopsy (2022).    Restrictions  Restrictions/Precautions  Restrictions/Precautions: Fall Risk, Bed Alarm, Isolation, Other (Comment) (Droplet Plus precautions due to COVID Positive)  Activity Level: Up as Tolerated  Required Braces or Orthoses?: No  Implants Present? :  (pt denies)  Position Activity Restriction  Other Position/Activity Restrictions: Speech therapy recommendations:  regular diet w/ thin liquids     Subjective  Pt pleasant and cooperative     General Comments: Pt resting in bed upon entering room, nursing okay'd  therapy           Objective    Transfers  Sit to Stand: Stand by assistance  Stand to Sit: Stand by assistance     Mobility      Ambulation  Surface: Level tile  Device: Rolling Walker  Other Apparatus:  (IV)  Assistance: Contact guard assistance  Quality of Gait: forward flexed trunk, NBOS  Gait Deviations: Decreased step length, Decreased step height, Slow Louise  Distance: 40 ft       Bed Mobility  Rolling to Left: Stand by assistance  Rolling to Right: Stand by

## 2025-07-12 NOTE — PROGRESS NOTES
[facility]  Initial Consult Note  Date: 7/12/2025    Hospital day :3     Chief complaint/reason for consultation:   Positive blood culture   COV-2 positive     Assessment/Impression:   Covid-19 infection   Contaminated blood culture  Syncope  Chronic thrombocytopenia  Diabetes mellitus, type II  Hypertension  KATHLEEN-resolved  Prolonged QTc      Plan/Recommendations   No objection for discharge from infectious disease point of view  Infection Control Recommendations   [place] Precautions  Droplet plus isolation    Antimicrobial Stewardship Recommendations   Simplification of therapy  Targeted therapy      History of Present Illness:   Carlos Enrique Padron is a 74 y.o.-year-old male with a history of CKD, hypertension, rheumatoid arthritis receives Remicade infusions and methotrexate.  Patient has also been diagnosed with skin cancer and receives laser therapy.  Patient was initially admitted on 7/8/2025 after an strokelike symptoms.  Per chart review patient fell while taking out the trash and fell again while inside of his home, family brought him to the ER where stroke alert was then called due to symptoms of confusion and delayed speech.  CT workup was negative for stroke. Patient was evaluated by neurology MRI was without any acute infarct or hemorrhage.  Patient reports recent contact with ill persons, he had family members who attended a cruise, felt ill, and visited with patient days prior.    On initial presentation, his labs were unremarkable besides creatinine of 1.5 indicating KATHLEEN, lactic acid 1.9, procalcitonin 0.21.  Patient did endorse some hypotension on initial presentation, all other vitals unremarkable.  Chest x-ray without acute findings but did mention asymmetric elevation of the right hemidiaphragm which pulmonary team was consulted.  Patient was started on IV vancomycin and Zosyn which was switched to IV ceftriaxone for treatment of suspected pneumonia.  MRSA swab negative.   FINDINGS: BONES AND JOINTS: Osteopenia. No evidence of an acute fracture or traumatic malalignment is present. A moderate degree of degenerative changes is present, involving the AC joint and glenohumeral joint. SOFT TISSUES: No foreign bodies are noted. Visualized left ribs appear normal.     1. No acute fracture or traumatic malalignment. 2. Moderate degenerative changes in the AC joint and glenohumeral joint. 3. Osteopenia.     CTA HEAD NECK W CONTRAST  Result Date: 7/8/2025  EXAMINATION: CTA OF THE HEAD AND NECK WITH CONTRAST 7/8/2025 7:36 pm: TECHNIQUE: CTA of the head and neck was performed with the administration of intravenous contrast. Multiplanar reformatted images are provided for review.  MIP images are provided for review. Stenosis of the internal carotid arteries measured using NASCET criteria. Automated exposure control, iterative reconstruction, and/or weight based adjustment of the mA/kV was utilized to reduce the radiation dose to as low as reasonably achievable. COMPARISON: None. HISTORY: ORDERING SYSTEM PROVIDED HISTORY: L-sided facial droop, ataxia, HA this  TECHNOLOGIST PROVIDED HISTORY: L-sided facial droop, ataxia, HA this  Decision Support Exception - unselect if not a suspected or confirmed emergency medical condition->Emergency Medical Condition (MA) Reason for Exam: L-sided facial droop, ataxia, HA this AM Additional signs and symptoms: fall from standing with lt upper and lower extremity weakness FINDINGS: CTA NECK: AORTIC ARCH/ARCH VESSELS: No dissection or arterial injury.  No significant stenosis of the brachiocephalic or subclavian arteries. CAROTID ARTERIES: Mild atherosclerosis at the carotid bulbs.  No dissection, arterial injury, or hemodynamically significant stenosis by NASCET criteria. VERTEBRAL ARTERIES: No dissection, arterial injury, or significant stenosis. SOFT TISSUES: The lung apices are clear.  No cervical or superior mediastinal lymphadenopathy.  The larynx and

## 2025-07-12 NOTE — DISCHARGE SUMMARY
[facility] Internal Medicine    Jonnie Pena MD; Garret Mc MD, Radha Elder MD,Dr. MARZENA Dorsey MD. ; Viviana Bunn MD      [facility] Internal Medicine  IN-PATIENT SERVICE   [facility]    Discharge Summary     Patient ID: Carlos Enrique Padron  :  1950   MRN: [id number]     ACCOUNT:  [id number]   Patient's PCP: Carl Rogers MD  Admit Date: 2025   Discharge Date: 2025     Length of Stay: 3  Code Status:  Full Code  Admitting Physician: Brant Goins MD  Discharge Physician: Dania Nicolas MD     Active Discharge Diagnoses:     Hospital Problem Lists:  Principal Problem:    Stroke-like symptoms  Active Problems:    Rheumatoid arthritis involving multiple sites with positive rheumatoid factor (HCC)    Thrombocytopenia    Aneurysm    Fall    Syncope  Resolved Problems:    * No resolved hospital problems. *      Admission Condition:  Serious      Discharged Condition: Stable     Hospital Stay:     Hospital Course:  Carlos Enrique Padron is a 74 y.o. male who was admitted for the management of   Stroke-like symptoms , presented to ER with Fall, Elbow Pain (Left), and Abrasion (Left shoulder)      74-year-old male with past medical history of hypertension, type 2 diabetes mellitus, rheumatoid arthritis presented to the ED after multiple falls and a syncopal event.  Strokelike symptoms.  CT head negative CTA showed 3.5 mm aneurysm at the bifurcation of hemizygous A2 segment.  Neurology was consulted, attempted to transfer to [facility] for neuroendovascular but due to limited on bed availability, recommended outpatient follow-up.  He was also found to have hypokalemia, nephrology followed.  Recommended discontinuation of hydrochlorothiazide.  Heme-onc followed for low platelet count likely secondary to his rheumatoid arthritis.  Patient remained stable, improved after some fluid resuscitation was ultimately discharged once okay with all services.    On  was made to ensure the accuracy of this automated transcription, some errors in transcription may have occurred.

## 2025-07-12 NOTE — PROGRESS NOTES
[facility]    Shaukat Rashid MD Zafar Magsi MD Danial Rashid MD John O. Ranker MD Aws Aljanabi MD Sembria Ligibel, [facility]       Nephrology Consultation Note    Reason for Consult:    Date of Service: 07/12/25   PCP: Carl Rogers MD       Reason for Consult     Hypokalemia    Assessment and Plan     Hypokalemia with hx of thiazide diuretics use.    Plan and recommendations:  Potassium bicarbonate 25 meq bid for now   Continue potassium bicarbonate as outpatient 25 meq daily if planned for discharge.  Nephrology follow up with [facility] nephrology to determine the need for potassium supplement long term,    Avoid thiazide diuretics.  We may check fractional excretion of potassium (FEK) if need be to evaluate hypokalemia further for renal losses reasons, will defer   Patient does not appear to have hyper-Franck or RTA.  Patient does not have any hypomagnesemia at this time.    Will optimize serum magnesium level and hence will give magnesium supplementation.    Recommend to use spironolactone if need be for hypertension control.    Thank you for this consultation, we will follow along with you regarding care of this patient while the patient is here in the hospital, please call if you have any questions or concerns.    Albino Meredith MD  on 7/12/2025 at 2:59 PM   [facility].  Ph: [phone]     History of presenting illness      Patient seen and examined at the bedside.  History obtained from the patient as well as from the EMR.    74 years old gentleman presented with multiple falls and near syncope.  Per primary team patient admitted with strokelike symptoms, cerebral aneurysm, sepsis workup.  Patient also has a history of rheumatoid arthritis and has been on immunosuppressive medications.  He has history of chronic thrombocytopenia.  We have been asked to see the patient in regard to his hypokalemia.  Patient himself is not

## 2025-07-12 NOTE — DISCHARGE INSTRUCTIONS
Start taking aspirin and Lipitor 1 tablet daily.  Your simvastatin and Dyazide have been stopped.  Please stop taking those medication.  Your Dyazide may be causing your potassium to go low.  Continue to use Norvasc and losartan for your blood pressure.  Follow-up with your PCP  Follow-up with neuroendovascular for brain aneurysm.  Return to ER/call 911 if symptoms worsen.

## 2025-07-12 NOTE — PROGRESS NOTES
Today's Date: 7/12/2025  Patient Name: Carlos Enrique Padron  Date of admission: 7/8/2025  6:53 PM  Patient's age: 74 y.o., 1950  Admission Dx: Aneurysm [I72.9]  Stroke-like symptoms [R29.90]  Symptoms of cerebrovascular accident [R09.89]  Fall, initial encounter [W19.XXXA]  Acute hypotension [I95.9]  Cerebral aneurysm [I67.1]    Reason for Consult: management recommendations  Requesting Physician: Brant Goins MD    CHIEF COMPLAINT: Falls, thrombocytopenia    INTERIM HISTORY  Patient seen and evaluated.  Clinically improving.  No fever or chills.  No chest pain.  Continues to have some episodes of dizziness.  ID following COVID infection.  Labs reviewed.  Hemoglobin 10.9.  Platelets 50.    HISTORY OF PRESENT ILLNESS:      The patient is a 74 y.o.   male who is admitted to the hospital after a fall and syncopal episode.  The patient has history of rheumatoid arthritis and chronic thrombocytopenia and he follows with rheumatology Dr. Jacobs.      Past Medical History:   has a past medical history of Borderline diabetic, Cerebral aneurysm, Chronic renal disease, stage III (HCC) [353596], History of colonoscopy, Hyperlipidemia, Hypertension, Osteoarthritis, RA (rheumatoid arthritis) ([facility]), and Type 2 diabetes mellitus without complication ([facility]).    Past Surgical History:   has a past surgical history that includes Tonsillectomy and adenoidectomy; Colonoscopy (N/A, 12/14/2018); and skin biopsy (01/11/2022).     Medications:    Reviewed in Epic     Allergies:  Patient has no known allergies.    Social History:   reports that he has never smoked. He has never used smokeless tobacco. He reports that he does not drink alcohol and does not use drugs.     Family History: family history includes Cancer in his father; Immune Disorder in his mother; Other in his mother.    REVIEW OF SYSTEMS:    Constitutional: No fever or chills. No night sweats, no weight loss   Eyes: No eye discharge, double vision, or eye pain   HEENT:  cyanosis   Skin - normal coloration and turgor, no rashes, no suspicious skin lesions noted ,    DATA:    Labs:   CBC:   Recent Labs     07/10/25  0557 07/11/25  0632   WBC 3.3* 2.7*   HGB 10.6* 10.9*   HCT 31.0* 30.8*   PLT See Reflexed IPF Result 50*     BMP:   Recent Labs     07/11/25  1924 07/12/25  0645    141   K 3.6* 3.5*   CO2 21 21   BUN 7* 6*   CREATININE 0.9 0.7   LABGLOM >90 >90   GLUCOSE 178* 144*     PT/INR: No results for input(s): \"PROTIME\", \"INR\" in the last 72 hours.    IMAGING DATA:      Primary Problem  Stroke-like symptoms    Active Hospital Problems    Diagnosis Date Noted    Stroke-like symptoms [R29.90] 07/09/2025    Aneurysm [I72.9] 07/09/2025    Fall [W19.XXXA] 07/09/2025    Thrombocytopenia [D69.6] 11/14/2023    Rheumatoid arthritis involving multiple sites with positive rheumatoid factor (HCC) [M05.79] 08/01/2016         IMPRESSION:   Syncopal episode  And strokelike symptoms  Cerebral aneurysm  Thrombocytopenia  Rheumatoid arthritis    RECOMMENDATIONS:  The patient thrombocytopenia is chronic due to RA with recent drop secondary to acute events and infections.  Continued monitoring is quite reasonable.  Okay to continue on Lovenox with platelets above 50.  We will follow.                            Floyd Schrader MD                          [facility]                            This note is created with the assistance of a speech recognition program.  While intending to generate a document that actually reflects the content of the visit, the document can still have some errors including those of syntax and sound a like substitutions which may escape proof reading.  It such instances, actual meaning can be extrapolated by contextual diversion.

## 2025-07-12 NOTE — PLAN OF CARE
Problem: Safety - Adult  Goal: Free from fall injury  7/12/2025 1559 by Shae Foy RN  Outcome: Adequate for Discharge     Problem: Chronic Conditions and Co-morbidities  Goal: Patient's chronic conditions and co-morbidity symptoms are monitored and maintained or improved  7/12/2025 1559 by Shae Foy RN  Outcome: Adequate for Discharge     Problem: Discharge Planning  Goal: Discharge to home or other facility with appropriate resources  7/12/2025 1559 by Shae Foy RN  Outcome: Adequate for Discharge

## 2025-07-13 LAB
MICROORGANISM SPEC CULT: NORMAL
SERVICE CMNT-IMP: NORMAL
SPECIMEN DESCRIPTION: NORMAL

## 2025-07-14 ENCOUNTER — TELEPHONE (OUTPATIENT)
Dept: NEUROLOGY | Age: 75
End: 2025-07-14

## 2025-07-14 NOTE — TELEPHONE ENCOUNTER
Left VM to have patient call back and schedule his DSA with Dr. Munoz.  Left my direct line and office hours.

## 2025-07-15 DIAGNOSIS — I72.9 ANEURYSM: Primary | ICD-10-CM

## 2025-07-15 LAB
MICROORGANISM SPEC CULT: NORMAL
MICROORGANISM SPEC CULT: NORMAL
SERVICE CMNT-IMP: NORMAL
SERVICE CMNT-IMP: NORMAL
SPECIMEN DESCRIPTION: NORMAL
SPECIMEN DESCRIPTION: NORMAL

## 2025-07-15 NOTE — TELEPHONE ENCOUNTER
Spoke with patients wife, Evelyn  Gave her date and time of procedure  7/28/2025  [facility]  Procedure at 8AM    Will need  NPO after midnight    Wife verbalized understanding  Sending patient information and map via mail and [employer]

## 2025-07-16 NOTE — PROGRESS NOTES
Physician Progress Note      PATIENT:               ADAIR MORROW  CSN #:                  [id number]  :                       1950  ADMIT DATE:       2025 6:53 PM  DISCH DATE:        2025 4:57 PM  RESPONDING  PROVIDER #:        Dania Nicolas MD          QUERY TEXT:    Syncope is documented in the medical record H&P and subsequent documentation.    Please clarify the cause such as:    The clinical indicators include:  H&P \"presented with multiple falls.  Initial fall while taking trash out fell   on the grass unsure if it was a syncopal event.  Helped by his family members   to get back into the house, another syncopal event.  Denies hitting his head.    Came into the ED.\"  \"KATHLEEN. Resolved\"     Pulmonary note  \"Syncope; negative stroke workup; likely secondary to COVID and dehydration\"     Neuro note  \"74 year old male who presented with fall and syncope- per family patient was   hypotensive during episodes. 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 Extensive white matter signal abnormality   suggesting changes of chronic small vessel disease.Mild generalized   parenchymal atrophy.\"  Options provided:  -- Syncope is multifactorial and related to orthostatic hypotension, KATHLEEN,   COVID and dehydration  -- Syncope related to, please provide cause of syncope  -- Other - I will add my own diagnosis  -- Disagree - Not applicable / Not valid  -- Disagree - Clinically unable to determine / Unknown  -- Refer to Clinical Documentation Reviewer    PROVIDER RESPONSE TEXT:    This patient has Syncope and is multifactorial, related to orthostatic   hypotension, KATHLEEN, COVID and dehydration    Query created by: Neha Collins on 2025 11:58 AM      Electronically signed by:  Dania Nicolas MD 2025 5:01 PM

## 2025-07-18 ENCOUNTER — HOSPITAL ENCOUNTER (OUTPATIENT)
Age: 75
Setting detail: SPECIMEN
Discharge: HOME | End: 2025-07-18

## 2025-07-18 LAB
ALBUMIN SERPL-MCNC: 3.3 G/DL (ref 3.5–5.2)
ALBUMIN/GLOB SERPL: 1 {RATIO} (ref 1–2.5)
ALP SERPL-CCNC: 53 U/L (ref 40–129)
ALT SERPL-CCNC: 38 U/L (ref 10–50)
ANION GAP SERPL CALCULATED.3IONS-SCNC: 10 MMOL/L (ref 9–16)
AST SERPL-CCNC: 59 U/L (ref 10–50)
BASOPHILS # BLD: <0.03 K/UL (ref 0–0.2)
BASOPHILS NFR BLD: 1 % (ref 0–2)
BILIRUB SERPL-MCNC: 1.2 MG/DL (ref 0–1.2)
BUN SERPL-MCNC: 14 MG/DL (ref 8–23)
CALCIUM SERPL-MCNC: 9.1 MG/DL (ref 8.6–10.4)
CHLORIDE SERPL-SCNC: 108 MMOL/L (ref 98–107)
CO2 SERPL-SCNC: 25 MMOL/L (ref 20–31)
CREAT SERPL-MCNC: 1.1 MG/DL (ref 0.7–1.2)
EOSINOPHIL # BLD: 0.13 K/UL (ref 0–0.44)
EOSINOPHILS RELATIVE PERCENT: 3 % (ref 1–4)
ERYTHROCYTE [DISTWIDTH] IN BLOOD BY AUTOMATED COUNT: 17.5 % (ref 11.8–14.4)
GFR, ESTIMATED: 71 ML/MIN/1.73M2
GLUCOSE SERPL-MCNC: 224 MG/DL (ref 74–99)
HCT VFR BLD AUTO: 36.9 % (ref 40.7–50.3)
HGB BLD-MCNC: 12.6 G/DL (ref 13–17)
IMM GRANULOCYTES # BLD AUTO: <0.03 K/UL (ref 0–0.3)
IMM GRANULOCYTES NFR BLD: 1 %
LYMPHOCYTES NFR BLD: 0.93 K/UL (ref 1.1–3.7)
LYMPHOCYTES RELATIVE PERCENT: 24 % (ref 24–43)
MCH RBC QN AUTO: 33.2 PG (ref 25.2–33.5)
MCHC RBC AUTO-ENTMCNC: 34.1 G/DL (ref 28.4–34.8)
MCV RBC AUTO: 97.1 FL (ref 82.6–102.9)
MONOCYTES NFR BLD: 0.45 K/UL (ref 0.1–1.2)
MONOCYTES NFR BLD: 12 % (ref 3–12)
NEUTROPHILS NFR BLD: 59 % (ref 36–65)
NEUTS SEG NFR BLD: 2.35 K/UL (ref 1.5–8.1)
NRBC BLD-RTO: 0 PER 100 WBC
PLATELET # BLD AUTO: 79 K/UL (ref 138–453)
PMV BLD AUTO: 11.7 FL (ref 8.1–13.5)
POTASSIUM SERPL-SCNC: 3.9 MMOL/L (ref 3.7–5.3)
PROT SERPL-MCNC: 6.6 G/DL (ref 6.6–8.7)
RBC # BLD AUTO: 3.8 M/UL (ref 4.21–5.77)
RBC # BLD: ABNORMAL 10*6/UL
SODIUM SERPL-SCNC: 143 MMOL/L (ref 136–145)
WBC OTHER # BLD: 3.9 K/UL (ref 3.5–11.3)

## 2025-07-28 ENCOUNTER — HOSPITAL ENCOUNTER (OUTPATIENT)
Dept: INTERVENTIONAL RADIOLOGY/VASCULAR | Age: 75
Discharge: HOME | End: 2025-07-30
Payer: MEDICARE

## 2025-07-28 VITALS
TEMPERATURE: 97.8 F | DIASTOLIC BLOOD PRESSURE: 91 MMHG | SYSTOLIC BLOOD PRESSURE: 127 MMHG | HEART RATE: 66 BPM | OXYGEN SATURATION: 100 % | RESPIRATION RATE: 15 BRPM

## 2025-07-28 DIAGNOSIS — I72.9 ANEURYSM: ICD-10-CM

## 2025-07-28 PROCEDURE — 7100000011 HC PHASE II RECOVERY - ADDTL 15 MIN: Performed by: STUDENT IN AN ORGANIZED HEALTH CARE EDUCATION/TRAINING PROGRAM

## 2025-07-28 PROCEDURE — 2580000003 HC RX 258: Performed by: PSYCHIATRY & NEUROLOGY

## 2025-07-28 PROCEDURE — 76377 3D RENDER W/INTRP POSTPROCES: CPT

## 2025-07-28 PROCEDURE — 6360000004 HC RX CONTRAST MEDICATION: Performed by: STUDENT IN AN ORGANIZED HEALTH CARE EDUCATION/TRAINING PROGRAM

## 2025-07-28 PROCEDURE — C1769 GUIDE WIRE: HCPCS

## 2025-07-28 PROCEDURE — 99153 MOD SED SAME PHYS/QHP EA: CPT

## 2025-07-28 PROCEDURE — 99152 MOD SED SAME PHYS/QHP 5/>YRS: CPT

## 2025-07-28 PROCEDURE — 36224 PLACE CATH CAROTD ART: CPT

## 2025-07-28 PROCEDURE — 7100000010 HC PHASE II RECOVERY - FIRST 15 MIN: Performed by: STUDENT IN AN ORGANIZED HEALTH CARE EDUCATION/TRAINING PROGRAM

## 2025-07-28 PROCEDURE — 76937 US GUIDE VASCULAR ACCESS: CPT

## 2025-07-28 PROCEDURE — 6360000002 HC RX W HCPCS: Performed by: STUDENT IN AN ORGANIZED HEALTH CARE EDUCATION/TRAINING PROGRAM

## 2025-07-28 RX ORDER — VERAPAMIL HYDROCHLORIDE 2.5 MG/ML
INJECTION INTRAVENOUS PRN
Status: COMPLETED | OUTPATIENT
Start: 2025-07-28 | End: 2025-07-28

## 2025-07-28 RX ORDER — ONDANSETRON 4 MG/1
4 TABLET, ORALLY DISINTEGRATING ORAL EVERY 8 HOURS PRN
Status: DISCONTINUED | OUTPATIENT
Start: 2025-07-28 | End: 2025-07-31 | Stop reason: HOSPADM

## 2025-07-28 RX ORDER — IODIXANOL 270 MG/ML
38 INJECTION, SOLUTION INTRAVASCULAR
Status: COMPLETED | OUTPATIENT
Start: 2025-07-28 | End: 2025-07-28

## 2025-07-28 RX ORDER — NITROGLYCERIN 20 MG/100ML
INJECTION INTRAVENOUS CONTINUOUS PRN
Status: COMPLETED | OUTPATIENT
Start: 2025-07-28 | End: 2025-07-28

## 2025-07-28 RX ORDER — SODIUM CHLORIDE 9 MG/ML
INJECTION, SOLUTION INTRAVENOUS PRN
Status: DISCONTINUED | OUTPATIENT
Start: 2025-07-28 | End: 2025-07-31 | Stop reason: HOSPADM

## 2025-07-28 RX ORDER — HEPARIN 100 UNIT/ML
SYRINGE INTRAVENOUS PRN
Status: COMPLETED | OUTPATIENT
Start: 2025-07-28 | End: 2025-07-28

## 2025-07-28 RX ORDER — ONDANSETRON 2 MG/ML
INJECTION INTRAMUSCULAR; INTRAVENOUS PRN
Status: COMPLETED | OUTPATIENT
Start: 2025-07-28 | End: 2025-07-28

## 2025-07-28 RX ORDER — MIDAZOLAM HYDROCHLORIDE 2 MG/2ML
INJECTION, SOLUTION INTRAMUSCULAR; INTRAVENOUS PRN
Status: COMPLETED | OUTPATIENT
Start: 2025-07-28 | End: 2025-07-28

## 2025-07-28 RX ORDER — FENTANYL CITRATE 50 UG/ML
INJECTION, SOLUTION INTRAMUSCULAR; INTRAVENOUS PRN
Status: COMPLETED | OUTPATIENT
Start: 2025-07-28 | End: 2025-07-28

## 2025-07-28 RX ORDER — SODIUM CHLORIDE 0.9 % (FLUSH) 0.9 %
5-40 SYRINGE (ML) INJECTION PRN
Status: DISCONTINUED | OUTPATIENT
Start: 2025-07-28 | End: 2025-07-31 | Stop reason: HOSPADM

## 2025-07-28 RX ORDER — SODIUM CHLORIDE 0.9 % (FLUSH) 0.9 %
5-40 SYRINGE (ML) INJECTION EVERY 12 HOURS SCHEDULED
Status: DISCONTINUED | OUTPATIENT
Start: 2025-07-28 | End: 2025-07-31 | Stop reason: HOSPADM

## 2025-07-28 RX ORDER — ONDANSETRON 2 MG/ML
4 INJECTION INTRAMUSCULAR; INTRAVENOUS EVERY 6 HOURS PRN
Status: DISCONTINUED | OUTPATIENT
Start: 2025-07-28 | End: 2025-07-31 | Stop reason: HOSPADM

## 2025-07-28 RX ORDER — SODIUM CHLORIDE 9 MG/ML
INJECTION, SOLUTION INTRAVENOUS CONTINUOUS
Status: DISCONTINUED | OUTPATIENT
Start: 2025-07-28 | End: 2025-07-31 | Stop reason: HOSPADM

## 2025-07-28 RX ADMIN — FENTANYL CITRATE 100 MCG: 50 INJECTION, SOLUTION INTRAMUSCULAR; INTRAVENOUS at 08:34

## 2025-07-28 RX ADMIN — SODIUM CHLORIDE: 9 INJECTION, SOLUTION INTRAVENOUS at 07:27

## 2025-07-28 RX ADMIN — IODIXANOL 38 ML: 270 INJECTION, SOLUTION INTRAVASCULAR at 09:21

## 2025-07-28 RX ADMIN — ONDANSETRON 4 MG: 2 INJECTION INTRAMUSCULAR; INTRAVENOUS at 08:58

## 2025-07-28 RX ADMIN — MIDAZOLAM HYDROCHLORIDE 1 MG: 1 INJECTION, SOLUTION INTRAMUSCULAR; INTRAVENOUS at 08:36

## 2025-07-28 RX ADMIN — MIDAZOLAM HYDROCHLORIDE 0.5 MG: 1 INJECTION, SOLUTION INTRAMUSCULAR; INTRAVENOUS at 08:36

## 2025-07-28 ASSESSMENT — PAIN SCALES - GENERAL: PAINLEVEL_OUTOF10: 0

## 2025-07-28 NOTE — PROGRESS NOTES
All discharge instructions reviewed, questions answered, paper signed and given copy. Patient discharged per wheelchair with wife, d/c paperwork and belongings.

## 2025-07-28 NOTE — BRIEF OP NOTE
[facility]    NEUROENDOVASCULAR SERVICE: POST-OP NOTE: 7/28/2025    Pt Name: Carlos Enrique Padron  MRN: [id number]  YOB: 1950  Date of Procedure: 7/28/2025  Primary Care Physician: Carl Rogers MD  Referring Physician:Dr. 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 Distal A3 callosomarginal artery fusiform dilation at the branchpoint of the paracentral artery.      Procedure Performed:Diagnostic Cerebral Angiogram    Surgeon:   Avni Munoz MD    Fellow:  John Russell MD and Roshan Cannon MD PhD     Assisting Tech:  Saskia Hernández    PRE-PROCEDURAL EXAM:  Prestroke baseline mRS MODIFIED LAURA SCORE: 0 - No symptoms at all.  Neurological exam performed and unchanged from initial H&P or consult    Anesthesia: IV Moderate Sedation  An Immediate re-assessment was completed prior to sedation, and it is determined to be safe to proceed.  Complications: none    Intra-Operative EXAM:  Neurological exam performed and unchanged from initial H&P or consult    Patient arrived and wheeled in to the angio suite at: 807  Monitoring and administration of sedation started at: 807  Puncture obtained at: 856  Vascular access was removed at: 911  Manual pressure for minutes: 15 mins  Sedation ended at: 920  Patient wheeled out of the angio suite at: 925    Heparin given: 2000 units  EBL: < Minimal      Cc            Specimens: Were not Obtained  Contrast:     Visipaque 270 low osmolar 38 Cc             Fluoro: 5.4 min    Findings:  -- Right radial access attempted, artery canalized with needle several times, but unable to advance wire due to spasm and small arterial caliber.  -- Selective angiograms from the L CCA, L ICA and R femoral.  -- L STEFAN A2 outpouching with an artery originating at the dome favored to be a small recurrent artery of roderick infundibulum.  -- L STEFAN distal A3 callosomarginal artery at

## 2025-07-28 NOTE — PROCEDURES
Date of Service: 7/28/2025    Procedure: Diagnostic cerebral angiogram     Patient arrived and wheeled in to the angio suite at: 807  Monitoring and administration of sedation started at: 807  Puncture obtained at: 856  Vascular access was removed at: 911  Manual pressure for minutes: 15 mins  Sedation ended at: 920  Patient wheeled out of the angio suite at: 925    Diagnosis: Unruptured intracranial aneurysm, incidental    Procedures:  --Right ultrasound guided femoral artery access  --Intravenous moderate sedation  --Selective left common carotid artery (CCA) angiogram   --Selective left internal carotid artery (ICA) cerebral angiogram   --3d rotational angiography with reconstruction on an external workstation  --Selective right common femoral artery angiogram    Neurointerventionalist: Dr. Rio Munoz    Addison:  Roshan Cannon MD, John Russell MD    Contrast: 38 cc of Visipaque-270    Fluoroscopy time: 5.4 minutes    Access: Right Common Femoral Artery    Consent:   After explaining the risks and benefits to the patient and the patient's family, including but not limited to stroke, coma, death, vessel injury, dissection, tear, occlusion, and X-ray dye allergic type reaction, a signed consent form was obtained    Indication and Clinical History:   Carlos Enrique Padron is a 75 y.o. man with medical history of rheumatoid arthritis, diabetes. Pt was referred for an initial diagnostic angiogram    Anesthesia:   Local anesthesia with lidocaine. IV moderate sedation with midazolam and fentanyl. IV moderate sedation was supervised by the attending physician. The patient was independently monitored by a registered nurse assigned to the Department of Radiology using automated blood pressure, EKG and pulse oximetry. The detailed Conscious Record is permanently stored in the Hospital Information System    Patient highly sensitive to minimal moderate sedation requiring face mask and repeated stimulation to arouse and

## 2025-07-28 NOTE — OR NURSING
BP down. Sat and RR down. Oxygen applied per mask. Zofran for nausea. Patient arousable but falls to sleep readily.

## 2025-07-28 NOTE — PROGRESS NOTES
Patient admitted, awaiting consent. Call light to reach with side rails up 2 of 2.  Family at bedside with patient.  History and physical needs completed.

## 2025-07-28 NOTE — H&P
Endovascular Neurosurgery Note    Reason for evaluation: Incidental Aneurysm  Referring Provider: No att. providers found  PCP: Carl Rogers MD     SUBJECTIVE:     Interval data 7/28: No new events reported  Current antithrombotic regimen: Aspirin    HPI:     History of Current Issue:  Carlos Enrique Padron is a 75 y.o. man p/w Left hemiparesis after a Syncope, and some minor external injuries to his L hemibody. Found with incidental L Acom aneurysm. EVN consulted and OP DSA was recommended    Current Outpatient Medications   Medication Instructions    amLODIPine (NORVASC) 5 MG tablet TAKE 1 TABLET DAILY    aspirin 81 mg, Oral, DAILY    atenolol (TENORMIN) 50 MG tablet TAKE ONE AND ONE-HALF TABLETS DAILY    atorvastatin (LIPITOR) 80 mg, Oral, NIGHTLY    blood glucose monitor strips Test blood sugar once daily, one touch teststrips    fluticasone (FLONASE) 50 MCG/ACT nasal spray 1 spray, Nasal, DAILY    folic acid (FOLVITE) 1 mg, DAILY    inFLIXimab (REMICADE) 100 MG injection 5 mg/kg, SEE ADMIN INSTRUCTIONS    JANUVIA 50 MG tablet TAKE 1 TABLET DAILY    loratadine (CLARITIN) 10 mg, DAILY PRN    losartan (COZAAR) 100 MG tablet TAKE 1 TABLET DAILY    magnesium oxide (MAG-OX) 400 mg, DAILY    methotrexate (RHEUMATREX) 2.5 mg, WEEKLY    Niacin (VITAMIN B-3 PO) Take by mouth    ONE TOUCH ULTRASOFT LANCETS MISC 1 each, Does not apply, DAILY    ONETOUCH DELICA LANCETS FINE MISC DX:E11.65 PATIENT TO TEST 2-3 TIMES DAILY    potassium chloride (MICRO-K) 10 MEQ extended release capsule TAKE 1 CAPSULE TWICE A DAY    vitamin D (CHOLECALCIFEROL) 1,000 Units, DAILY     Lab Results   Component Value Date    LDL 39 07/10/2025     Hemoglobin A1C   Date Value Ref Range Status   07/10/2025 8.0 (H) 4.0 - 6.0 % Final     Past Medical History:   Diagnosis Date    Borderline diabetic     Cerebral aneurysm 7/9/2025    Chronic renal disease, stage III (HCC) [899845] 05/31/2022    History of colonoscopy 12/14/2018    multiple

## 2025-07-28 NOTE — OR NURSING
Hemostasis noted. Neuro assessment is unchanged. Pulses palpable. Groin soft. Safe guard used. Report called and patient back on to stretcher.
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
## (undated) DEVICE — JELLY,LUBE,STERILE,FLIP TOP,TUBE,2-OZ: Brand: MEDLINE

## (undated) DEVICE — POLYP TRAP: Brand: TRAPEASE®

## (undated) DEVICE — ERBE NESSY® OMEGA PLATE USA (85+23)CM² , WITH CABLE 3 M: Brand: ERBE

## (undated) DEVICE — GLOVE ORANGE PI 7   MSG9070

## (undated) DEVICE — GOWN,POLY REINFORCED,LG: Brand: MEDLINE

## (undated) DEVICE — Z DUPLICATE USE 2527422 TUBING O2 STD 7 FT EXTN NO CRUSH VISUAL CNTRST LF

## (undated) DEVICE — SNARE ENDOSCP L240CM LOOP W13MM DIA2.4MM SHT THROW SM OVL

## (undated) DEVICE — DEFENDO AIR WATER SUCTION AND BIOPSY VALVE KIT FOR  OLYMPUS: Brand: DEFENDO AIR/WATER/SUCTION AND BIOPSY VALVE

## (undated) DEVICE — SYRINGE MED 50ML LUERSLIP TIP